# Patient Record
Sex: FEMALE | Race: WHITE | NOT HISPANIC OR LATINO | Employment: FULL TIME | URBAN - METROPOLITAN AREA
[De-identification: names, ages, dates, MRNs, and addresses within clinical notes are randomized per-mention and may not be internally consistent; named-entity substitution may affect disease eponyms.]

---

## 2017-01-14 ENCOUNTER — HOSPITAL ENCOUNTER (EMERGENCY)
Facility: OTHER | Age: 22
Discharge: HOME | End: 2017-01-14
Attending: OBSTETRICS & GYNECOLOGY
Payer: MEDICAID

## 2017-01-14 VITALS
HEART RATE: 82 BPM | TEMPERATURE: 97 F | SYSTOLIC BLOOD PRESSURE: 114 MMHG | RESPIRATION RATE: 18 BRPM | DIASTOLIC BLOOD PRESSURE: 75 MMHG | OXYGEN SATURATION: 100 %

## 2017-01-14 DIAGNOSIS — Z3A.38 38 WEEKS GESTATION OF PREGNANCY: ICD-10-CM

## 2017-01-14 DIAGNOSIS — O47.9 IRREGULAR UTERINE CONTRACTIONS: Primary | ICD-10-CM

## 2017-01-14 PROCEDURE — 59025 FETAL NON-STRESS TEST: CPT | Mod: 26,,, | Performed by: OBSTETRICS & GYNECOLOGY

## 2017-01-14 PROCEDURE — 59025 FETAL NON-STRESS TEST: CPT

## 2017-01-14 PROCEDURE — 99282 EMERGENCY DEPT VISIT SF MDM: CPT | Mod: 25,,, | Performed by: OBSTETRICS & GYNECOLOGY

## 2017-01-14 PROCEDURE — 99284 EMERGENCY DEPT VISIT MOD MDM: CPT | Mod: 25

## 2017-01-14 NOTE — ED PROVIDER NOTES
Encounter Date: 2017       History     Tosin Mckay is a 21 y.o. F at Unknown presents complaining of intermittent contractions. Pt cannot quantify frequency, but states they are less frequent than q5. Rated 6/10. Associated with pelvic pressure.  PT gets care at [facility] with Dr. Friend and came by EMS as she did not believe she could make it. Denies VB, LOF or decreased fetal movement.  This IUP is complicated by teen pregnancy per patient.      Chief Complaint   Patient presents with    Contractions     Review of patient's allergies indicates:  No Known Allergies  The history is provided by the patient.     History reviewed. No pertinent past medical history.  Past Medical History Pertinent Negatives   Diagnosis Date Noted    Asthma 2017    Diabetes mellitus 2017    Hypertension 2017     No past surgical history on file.  History reviewed. No pertinent family history.  Social History   Substance Use Topics    Smoking status: None    Smokeless tobacco: None    Alcohol use None     Review of Systems   Gastrointestinal: Positive for abdominal pain.   Genitourinary: Positive for pelvic pain. Negative for dysuria, hematuria, vaginal bleeding, vaginal discharge and vaginal pain.       Physical Exam   Initial Vitals   BP Pulse Resp Temp SpO2   17 0601 17 0601 17 0601 17 0601 17 0601   114/75 86 18 97.3 °F (36.3 °C) 100 %     Physical Exam    Vitals reviewed.  Constitutional: She appears well-developed and well-nourished. She is not diaphoretic. No distress.   Pleasant, appears comfortable.   HENT:   Head: Normocephalic and atraumatic.   Eyes: EOM are normal.   Neck: Normal range of motion.   Cardiovascular: Normal rate.   Pulmonary/Chest: No respiratory distress.   Abdominal: There is no tenderness.   Genitourinary: No labial fusion. There is no rash, tenderness, lesion or injury on the right labia. There is no rash, tenderness, lesion or injury on the  left labia.   Psychiatric: She has a normal mood and affect.     OB LABOR EXAM:   Pre-Term Labor: No.   Membranes ruptured: No.   Method: Sterile vaginal exam per MD.   Vaginal Bleeding: none present.     Dilatation: 1.     Amniotic Fluid Color: no fluid.     Comments: NST reactive and reassuring  US: cephalic with MVP of 5.34cm       ED Course   Procedures  Labs Reviewed   POCT URINALYSIS, DIPSTICK OR TABLET REAGENT, AUTOMATED, WITH MICROSCOP             Medical Decision Making:   ED Management:  -NST reactive and reassuring  -Minimal contractions on monitor  -Pt is comfortable. Cervix unchanged from clinic visit.  -Labor precautions reviewed with patient and mother  Other:   I have discussed this case with another health care provider.                   ED Course     Clinical Impression:   The encounter diagnosis was Irregular uterine contractions.    Disposition:   Disposition: Discharged  Condition: Stable  Pt was given routine pregnancy instructions including to return to triage if she had any vaginal bleeding (other than spotting for the next 48hrs), any loss of fluid like her water broke, decreased fetal movement, or contractions Q 5min  lasting for 2 or more hours. Pt was also instructed to drink copious water. Patient voiced understanding of all theseinstructions and was subsequently discharged home.       Annette Stark MD  Resident  01/14/17 1089

## 2017-01-14 NOTE — ED AVS SNAPSHOT
[facility]  [address]  [place] [address]               Tosin Mckay   2017  5:55 AM   ED    Description:  Female : 1995   Department:  [facility]           Your Care was Coordinated By:     Provider Role From To    Kamille Lam DO Attending Provider 17 --    Annette Stark MD Resident 17 --      Reason for Visit     Contractions           Diagnoses this Visit        Comments    Irregular uterine contractions    -  Primary       ED Disposition     ED Disposition Condition Comment    Discharge  Patient given discharge instructions.  Patient verbalized understanding.  Discharged home with no further questions.              To Do List           [facility]     [facility] Nurse Care Line -  Assistance  Registered nurses in the [facility] provide clinical advisement, health education, appointment booking, and other advisory services.  Call for this free service at [phone].             Medications                Verify that the below list of medications is an accurate representation of the medications you are currently taking.  If none reported, the list may be blank. If incorrect, please contact your healthcare provider. Carry this list with you in case of emergency.                Clinical Reference Information           Your Vitals Were     BP Pulse Temp Resp SpO2       114/75 82 97.3 °F (36.3 °C) (Temporal) 18 100%       Allergies as of 2017     No Known Allergies      Immunizations Administered on Date of Encounter - 2017     None      ED Micro, Lab, POCT     Start Ordered       Status Ordering Provider    17 0609 17 0608  POCT urinalysis, dipstick or tablet reag  Once      Acknowledged       ED Imaging Orders     None        Discharge Instructions       Keep previously scheduled clinic appointment  Return to L&D if you experience contractions every 2-5  minutes for two consecutive hours  Vaginal Bleeding  Decreased fetal movement  Leaking of fluid  Headache, dizziness or blurred vision  Temperature 100.4 or greater  Call the clinic ([phone]) during the day time or L&D ([phone]) after hours for any questions/concerns.  Drink 8-10 glasses of water a day    [facility]  To reach our free 24/7 nurse care line, call [phone]  Our specially trained nurses will help you determine the best care options for you. [facility] provides nurse triage, appointment booking, health education, and advisory services.       [facility] Sign-Up     Activating your [facility] account is as easy as 1-2-3!     1) Visit [email or web address], select Sign Up Now, enter this activation code and your date of birth, then select Next.  [email or web address]  Expires: 2/28/2017  6:38 AM      2) Create a username and password to use when you visit [facility] in the future and select a security question in case you lose your password and select Next.    3) Enter your e-mail address and click Sign Up!    Additional Information  If you have questions, please e-mail [email or web address] or call [phone] to talk to our [facility] staff. Remember, [facility] is NOT to be used for urgent needs. For medical emergencies, dial 911.         Smoking Cessation     If you would like to quit smoking:   You may be eligible for free services if you are a [place] resident and started smoking cigarettes before September 1, 1988.  Call the Smoking Cessation Trust (SCT) toll free at (523) 397-3522 or (977) 989-8065.   Call 7-450-QUIT-NOW if you do not meet the above criteria.             [facility] complies with applicable Federal civil rights laws and does not discriminate on the basis of race, color, national origin, age, disability, or sex.        Language Assistance Services     ATTENTION: Language assistance services are available, free of charge. Please call [phone].       ATENCIÓN: Si habla español, tiene a elizondo disposición servicios gratuitos de asistencia lingüística. Llame al [phone].     CHÚ Ý: N?u b?n nói Ti?ng Vi?t, có các d?ch v? h? tr? ngôn ng? mi?n phí dành cho b?n. G?i s? [phone].

## 2017-01-14 NOTE — ED TRIAGE NOTES
Pt arrives via EMS complaining of vaginal pressure and irregular contractions that are no less than 5 min apart. Pt states she sees Dr. Friend in [place] and is scheduled for and induction with him next week.

## 2017-01-14 NOTE — DISCHARGE INSTRUCTIONS
Keep previously scheduled clinic appointment  Return to L&D if you experience contractions every 2-5 minutes for two consecutive hours  Vaginal Bleeding  Decreased fetal movement  Leaking of fluid  Headache, dizziness or blurred vision  Temperature 100.4 or greater  Call the clinic ([phone]) during the day time or L&D ([phone]) after hours for any questions/concerns.  Drink 8-10 glasses of water a day    [facility]  To reach our free 24/7 nurse care line, call [phone]  Our specially trained nurses will help you determine the best care options for you. [facility] provides nurse triage, appointment booking, health education, and advisory services.

## 2017-01-26 ENCOUNTER — HOSPITAL ENCOUNTER (OUTPATIENT)
Facility: OTHER | Age: 22
Discharge: HOME | End: 2017-01-27
Attending: OBSTETRICS & GYNECOLOGY | Admitting: OBSTETRICS & GYNECOLOGY
Payer: MEDICAID

## 2017-01-26 DIAGNOSIS — R93.89 THICKENED ENDOMETRIUM: Primary | ICD-10-CM

## 2017-01-26 LAB
ABO + RH BLD: NORMAL
BASOPHILS # BLD AUTO: 0.03 K/UL
BASOPHILS NFR BLD: 0.2 %
BLD GP AB SCN CELLS X3 SERPL QL: NORMAL
DIFFERENTIAL METHOD: ABNORMAL
EOSINOPHIL # BLD AUTO: 0.1 K/UL
EOSINOPHIL NFR BLD: 0.8 %
ERYTHROCYTE [DISTWIDTH] IN BLOOD BY AUTOMATED COUNT: 14.5 %
HCT VFR BLD AUTO: 28.6 %
HGB BLD-MCNC: 9.5 G/DL
LYMPHOCYTES # BLD AUTO: 1.3 K/UL
LYMPHOCYTES NFR BLD: 10.2 %
MCH RBC QN AUTO: 25.8 PG
MCHC RBC AUTO-ENTMCNC: 33.2 %
MCV RBC AUTO: 78 FL
MONOCYTES # BLD AUTO: 0.5 K/UL
MONOCYTES NFR BLD: 3.9 %
NEUTROPHILS # BLD AUTO: 10.6 K/UL
NEUTROPHILS NFR BLD: 84.5 %
PLATELET # BLD AUTO: 190 K/UL
PMV BLD AUTO: 9.8 FL
RBC # BLD AUTO: 3.68 M/UL
WBC # BLD AUTO: 12.53 K/UL

## 2017-01-26 PROCEDURE — 76815 OB US LIMITED FETUS(S): CPT

## 2017-01-26 PROCEDURE — 85025 COMPLETE CBC W/AUTO DIFF WBC: CPT

## 2017-01-26 PROCEDURE — 86850 RBC ANTIBODY SCREEN: CPT

## 2017-01-26 PROCEDURE — G0378 HOSPITAL OBSERVATION PER HR: HCPCS

## 2017-01-26 PROCEDURE — 25000003 PHARM REV CODE 250: Performed by: OBSTETRICS & GYNECOLOGY

## 2017-01-26 PROCEDURE — 25000003 PHARM REV CODE 250: Performed by: STUDENT IN AN ORGANIZED HEALTH CARE EDUCATION/TRAINING PROGRAM

## 2017-01-26 PROCEDURE — 86900 BLOOD TYPING SEROLOGIC ABO: CPT

## 2017-01-26 PROCEDURE — 99285 EMERGENCY DEPT VISIT HI MDM: CPT | Mod: 25

## 2017-01-26 PROCEDURE — 99283 EMERGENCY DEPT VISIT LOW MDM: CPT | Mod: ,,, | Performed by: OBSTETRICS & GYNECOLOGY

## 2017-01-26 RX ORDER — OXYCODONE AND ACETAMINOPHEN 10; 325 MG/1; MG/1
1 TABLET ORAL EVERY 4 HOURS PRN
Status: DISCONTINUED | OUTPATIENT
Start: 2017-01-26 | End: 2017-01-27 | Stop reason: HOSPADM

## 2017-01-26 RX ORDER — ONDANSETRON 4 MG/1
8 TABLET, ORALLY DISINTEGRATING ORAL EVERY 8 HOURS PRN
Status: DISCONTINUED | OUTPATIENT
Start: 2017-01-26 | End: 2017-01-27 | Stop reason: HOSPADM

## 2017-01-26 RX ORDER — DIPHENHYDRAMINE HYDROCHLORIDE 50 MG/ML
25 INJECTION INTRAMUSCULAR; INTRAVENOUS EVERY 4 HOURS PRN
Status: DISCONTINUED | OUTPATIENT
Start: 2017-01-26 | End: 2017-01-27 | Stop reason: HOSPADM

## 2017-01-26 RX ORDER — OXYCODONE AND ACETAMINOPHEN 5; 325 MG/1; MG/1
1 TABLET ORAL EVERY 4 HOURS PRN
Status: DISCONTINUED | OUTPATIENT
Start: 2017-01-26 | End: 2017-01-27 | Stop reason: HOSPADM

## 2017-01-26 RX ORDER — DIPHENHYDRAMINE HCL 25 MG
25 CAPSULE ORAL EVERY 4 HOURS PRN
Status: DISCONTINUED | OUTPATIENT
Start: 2017-01-26 | End: 2017-01-27 | Stop reason: HOSPADM

## 2017-01-26 RX ORDER — ACETAMINOPHEN 325 MG/1
650 TABLET ORAL EVERY 6 HOURS PRN
Status: DISCONTINUED | OUTPATIENT
Start: 2017-01-26 | End: 2017-01-27 | Stop reason: HOSPADM

## 2017-01-26 RX ORDER — HYDROCORTISONE 25 MG/G
CREAM TOPICAL 3 TIMES DAILY PRN
Status: DISCONTINUED | OUTPATIENT
Start: 2017-01-26 | End: 2017-01-27 | Stop reason: HOSPADM

## 2017-01-26 RX ORDER — MISOPROSTOL 200 UG/1
200 TABLET ORAL EVERY 6 HOURS
Status: COMPLETED | OUTPATIENT
Start: 2017-01-26 | End: 2017-01-27

## 2017-01-26 RX ORDER — DOCUSATE SODIUM 100 MG/1
200 CAPSULE, LIQUID FILLED ORAL 2 TIMES DAILY PRN
Status: DISCONTINUED | OUTPATIENT
Start: 2017-01-26 | End: 2017-01-27 | Stop reason: HOSPADM

## 2017-01-26 RX ORDER — IBUPROFEN 600 MG/1
600 TABLET ORAL EVERY 6 HOURS PRN
Status: DISCONTINUED | OUTPATIENT
Start: 2017-01-26 | End: 2017-01-27 | Stop reason: HOSPADM

## 2017-01-26 RX ORDER — MISOPROSTOL 200 UG/1
400 TABLET ORAL ONCE
Status: COMPLETED | OUTPATIENT
Start: 2017-01-26 | End: 2017-01-26

## 2017-01-26 RX ORDER — SODIUM CHLORIDE, SODIUM LACTATE, POTASSIUM CHLORIDE, CALCIUM CHLORIDE 600; 310; 30; 20 MG/100ML; MG/100ML; MG/100ML; MG/100ML
INJECTION, SOLUTION INTRAVENOUS CONTINUOUS
Status: DISCONTINUED | OUTPATIENT
Start: 2017-01-26 | End: 2017-01-27

## 2017-01-26 RX ORDER — ZOLPIDEM TARTRATE 5 MG/1
5 TABLET ORAL NIGHTLY PRN
Status: DISCONTINUED | OUTPATIENT
Start: 2017-01-26 | End: 2017-01-27 | Stop reason: HOSPADM

## 2017-01-26 RX ORDER — SODIUM CHLORIDE 9 MG/ML
INJECTION, SOLUTION INTRAVENOUS CONTINUOUS
Status: DISCONTINUED | OUTPATIENT
Start: 2017-01-26 | End: 2017-01-26

## 2017-01-26 RX ADMIN — SODIUM CHLORIDE, SODIUM LACTATE, POTASSIUM CHLORIDE, AND CALCIUM CHLORIDE: .6; .31; .03; .02 INJECTION, SOLUTION INTRAVENOUS at 11:01

## 2017-01-26 RX ADMIN — SODIUM CHLORIDE, SODIUM LACTATE, POTASSIUM CHLORIDE, AND CALCIUM CHLORIDE 125 ML/HR: .6; .31; .03; .02 INJECTION, SOLUTION INTRAVENOUS at 05:01

## 2017-01-26 RX ADMIN — MISOPROSTOL 200 MCG: 200 TABLET ORAL at 10:01

## 2017-01-26 RX ADMIN — MISOPROSTOL 400 MCG: 200 TABLET ORAL at 03:01

## 2017-01-26 NOTE — IP AVS SNAPSHOT
[facility] ([facility])  [address] [place], [place] [id number]  Phone: [phone]           Patient Discharge Instructions     Our goal is to set you up for success. This packet includes information on your condition, medications, and your home care. It will help you to care for yourself so you don't get sicker and need to go back to the hospital.     Please ask your nurse if you have any questions.        There are many details to remember when preparing to leave the hospital. Here is what you will need to do:    1. Take your medicine. If you are prescribed medications, review your Medication List in the following pages. You may have new medications to  at the pharmacy and others that you'll need to stop taking. Review the instructions for how and when to take your medications. Talk with your doctor or nurses if you are unsure of what to do.     2. Go to your follow-up appointments. Specific follow-up information is listed in the following pages. Your may be contacted by a transition nurse or clinical provider about future appointments. Be sure we have all of the phone numbers to reach you, if needed. Please contact your provider's office if you are unable to make an appointment.     3. Watch for warning signs. Your doctor or nurse will give you detailed warning signs to watch for and when to call for assistance. These instructions may also include educational information about your condition. If you experience any of warning signs to your health, call your doctor.               [facility]  Unless otherwise directed by your provider, please contact [facility], our nurse care line that is available for 24/7 assistance.     [phone] (toll-free)    Registered nurses in the [facility] provide clinical advisement, health education, appointment booking, and other advisory services.                    ** Verify the list of medication(s) below is accurate and up to  date. Carry this with you in case of emergency. If your medications have changed, please notify your healthcare provider.             Medication List      START taking these medications        Additional Info                      misoprostol 200 MCG Tab   Commonly known as:  CYTOTEC   Quantity:  1 tablet   Refills:  0   Dose:  200 mcg    Last time this was given:  200 mcg on 1/27/2017 10:37 AM   Instructions:  Take 1 tablet (200 mcg total) by mouth once.     Begin Date    AM    Noon    PM    Bedtime            Where to Get Your Medications      You can get these medications from any pharmacy     Bring a paper prescription for each of these medications     misoprostol 200 MCG Tab                  Please bring to all follow up appointments:    1. A copy of your discharge instructions.  2. All medicines you are currently taking in their original bottles.  3. Identification and insurance card.    Please arrive 15 minutes ahead of scheduled appointment time.    Please call 24 hours in advance if you must reschedule your appointment and/or time.          Discharge Instructions     Future Orders    Activity as tolerated     Call MD for:  difficulty breathing or increased cough     Call MD for:  increased confusion or weakness     Call MD for:  persistent dizziness, light-headedness, or visual disturbances     Call MD for:  persistent nausea and vomiting or diarrhea     Call MD for:  severe persistent headache     Call MD for:  severe uncontrolled pain     Call MD for:  temperature >100.4     Call MD for:     Comments:    Vaginal bleeding through one or more pads each hour for 2 hours    Diet general     Questions:    Total calories:      Fat restriction, if any:      Protein restriction, if any:      Na restriction, if any:      Fluid restriction:      Additional restrictions:      Other restrictions (specify):     Comments:    Pelvic rest until post partum visit. (No sex, no tampons, etc.)        Primary Diagnosis      Your primary diagnosis was:  Postpartum Hemorrhage      Admission Information     Date & Time Provider Department CSN    1/26/2017  1:41 PM Ranjit Sotelo MD [facility] [id number]      Care Providers     Provider Role Specialty Primary office phone    Ranjit Sotelo MD Attending Provider Obstetrics [phone]      Your Vitals Were     BP Pulse Temp Resp SpO2       121/64 76 98.3 °F (36.8 °C) (Oral) 18 100%       Recent Lab Values     No lab values to display.      Allergies as of 1/27/2017     No Known Allergies      Advance Directives     An advance directive is a document which, in the event you are no longer able to make decisions for yourself, tells your healthcare team what kind of treatment you do or do not want to receive, or who you would like to make those decisions for you.  If you do not currently have an advance directive, [facility] encourages you to create one.  For more information call:  [phone]), [phone] ([phone]),  or log on to [email or web address].        Smoking Cessation     If you would like to quit smoking:   You may be eligible for free services if you are a [place] resident and started smoking cigarettes before September 1, 1988.  Call the Smoking Cessation Trust (SCT) toll free at (143) 503-5851 or (810) 311-6418.   Call 6-889-QUIT-NOW if you do not meet the above criteria.            Language Assistance Services     ATTENTION: Language assistance services are available, free of charge. Please call [phone].      ATENCIÓN: Si habla español, tiene a elizondo disposición servicios gratuitos de asistencia lingüística. Llame al [phone].     CHÚ Ý: N?u b?n nói Ti?ng Vi?t, có các d?ch v? h? tr? ngôn ng? mi?n phí dành cho b?n. G?i s? [phone].        [facility] Sign-Up     Activating your [facility] account is as easy as 1-2-3!     1) Visit [email or web address], select Sign Up Now, enter this activation code and your date of birth, then  select Next.  [email or web address]  Expires: 2/28/2017  6:38 AM      2) Create a username and password to use when you visit [facility] in the future and select a security question in case you lose your password and select Next.    3) Enter your e-mail address and click Sign Up!    Additional Information  If you have questions, please e-mail [email or web address] or call [phone] to talk to our [facility] staff. Remember, [facility] is NOT to be used for urgent needs. For medical emergencies, dial 911.          [facility] complies with applicable Federal civil rights laws and does not discriminate on the basis of race, color, national origin, age, disability, or sex.

## 2017-01-26 NOTE — PROGRESS NOTES
"Pt from OB ED to [facility] room 645 per w/c, oriented to room and call bell/ bed controls, denies pain, only c/o "being cold," warm blankets applied and room temp raised for comfort, instructed pt on pad counts and notification of any bleeding, verbalized understanding.  "

## 2017-01-26 NOTE — H&P
History and Physical  Antepartum          Subjective:      Ms. Mckay is a 22 yo  PPD3 s/p uncomplicated term  who presents to [facility] via EMS with complaints of vaginal bleeding.  Patient states she delivered at [facility] on Monday with no complications during delivery.  She reports that a few hours after delivery she experienced heavy vaginal bleeding, passing clots.  She states her Hgb was 5.8 and she received 2u pRBCs yesterday.  She reports she was discharged home last night after the transfusion was complete.  Patient states this morning she woke up around 11AM and noted vaginal bleeding with clots. She states she called her primary Ob who instructed her to go to the hospital. Patient states she was unable to get to [facility] so she came here.  She currently reports weakness, denies lightheadedness, dizziness, nausea, or vomiting. She does feel that she looks paler than usual. She has no other complaints.       PMHx: No past medical history on file.    PSHx: No past surgical history on file.    All: Review of patient's allergies indicates:  No Known Allergies    Meds:   (Not in a hospital admission)    SH:   Social History     Social History    Marital status: Single     Spouse name: N/A    Number of children: N/A    Years of education: N/A     Occupational History    Not on file.     Social History Main Topics    Smoking status: Not on file    Smokeless tobacco: Not on file    Alcohol use Not on file    Drug use: Not on file    Sexual activity: Not on file     Other Topics Concern    Not on file     Social History Narrative       FH: No family history on file.    OBHx:   Obstetric History       T0      TAB0   SAB0   E0   M0   L2       # Outcome Date GA Lbr Colby/2nd Weight Sex Delivery Anes PTL Lv   2 Para 17    F Vag-Spont   Y   1 Para 14    M Vag-Spont   Y          Objective:         Visit Vitals    /63    Pulse 80    Temp 98 °F (36.7 °C)    Resp  18    SpO2 100%       Temp:  [98 °F (36.7 °C)] 98 °F (36.7 °C)  Pulse:  [80] 80  Resp:  [18] 18  SpO2:  [100 %] 100 %  BP: (123)/(63) 123/63     Constitutional: She appears well-developed and well-nourished. No distress.   HENT:   Cardiovascular: Normal rate, regular rhythm, normal heart sounds and intact distal pulses.   Pulmonary/Chest: Breath sounds normal. No respiratory distress.   Abdominal: Soft. There is no tenderness. There is no rebound.   Pelvic: Uterus firm, nontender, about 14 wks in size. Watery lochia expressed   Musculoskeletal: She exhibits no edema or tenderness.   Neurological: She is alert and oriented to person, place, and time.   Skin: Skin is warm and dry. There is pallor.   Psychiatric: She has a normal mood and affect. Her behavior is normal. Judgment and thought content normal.     Bedside Ultrasound:  Limited ultrasound uterus enlarged - endometrial stripe appears   3-4 cm & heterogenous (I am unable to measure adequately & machine paper   is broken) will get formal ultrasound.      Assessment:     22 yo  PPD3 s/p uncomplicated term  who presents to [facility] via EMS with complaints of vaginal bleeding    Active Hospital Problems    Diagnosis  POA    *Postpartum bleeding [O72.1]  Unknown    Thickened endometrium [R93.8]  Yes      Resolved Hospital Problems    Diagnosis Date Resolved POA   No resolved problems to display.            Plan:          1. Postpartum bleeding  - Admit to antepartum service  - Consents for blood transfusion signed and to chart  - Risks, benefits, alternatives and possible complications have been discussed in detail with the   patient.    - Will put patient on cytotec series, 400mg now, 200mg q6 x 3 doses  - Pad counts  - CBC in AM  - Will keep patient NPO tonight in case she requires D & C, plan to allow diet in morning  - **Will need D & C consent if she requires surgery      Discussed plan with Dr. Ashleigh Carson MD

## 2017-01-26 NOTE — ED PROVIDER NOTES
Encounter Date: 2017       History     Chief Complaint   Patient presents with    Vaginal Bleeding     Review of patient's allergies indicates:  No Known Allergies  HPI Comments: 20 yo  s/p  at term, now Post partum Day #3 presents via EMS for heavy vaginal bleeding.  Patient reports being sent home from the hospital at [facility] last night after a delivery on Monday. The deliver is reported as uneventful; but the patient reports very heavy bleeding and passing out post partum. She was given 2 units of blood yesterday & sent home. Patient reports her Hemaglobin was 5.8 prior to transfusion.    She spent the night at home (she lives on the [place] now) and woke up passing large clots again - today the size of a grapefruit. She called her Dr's office and was told to seek care.     She reports feeling weak; but not dizzy right now.      The history is provided by the patient and the EMS personnel.     No past medical history on file.  Past Medical History Pertinent Negatives   Diagnosis Date Noted    Asthma 2017    Diabetes mellitus 2017    Hypertension 2017     No past surgical history on file.  No family history on file.  Social History   Substance Use Topics    Smoking status: Not on file    Smokeless tobacco: Not on file    Alcohol use Not on file     Review of Systems   Constitutional: Positive for chills and fatigue. Negative for fever.   HENT: Negative.  Negative for sore throat.    Eyes: Negative.    Respiratory: Negative.    Cardiovascular: Negative.    Gastrointestinal: Negative for constipation, diarrhea, nausea and vomiting.   Genitourinary: Positive for vaginal bleeding. Negative for difficulty urinating and pelvic pain.   Musculoskeletal: Negative.    Skin: Positive for pallor. Negative for rash and wound.   Hematological: Negative.        Physical Exam   Initial Vitals   BP Pulse Resp Temp SpO2   -- -- -- -- --            Visit Vitals    /63    Pulse  80    Temp 98 °F (36.7 °C)    Resp 18    SpO2 100%       Physical Exam    Nursing note and vitals reviewed.  Constitutional: She appears well-developed and well-nourished. No distress.   HENT:   Head: Normocephalic and atraumatic.   Mouth/Throat: Oropharynx is clear and moist.   Eyes: Conjunctivae and EOM are normal. No scleral icterus.   Cardiovascular: Normal rate, regular rhythm, normal heart sounds and intact distal pulses.   Pulmonary/Chest: Breath sounds normal. No respiratory distress.   Abdominal: Soft. There is no tenderness. There is no rebound.   Uterus ~ 14 weeks; nontender   Genitourinary:   Genitourinary Comments: Watery lochia expressed   Musculoskeletal: She exhibits no edema or tenderness.   Neurological: She is alert and oriented to person, place, and time.   Skin: Skin is warm and dry. There is pallor.   Psychiatric: She has a normal mood and affect. Her behavior is normal. Judgment and thought content normal.         ED Course   US - ED Performed - Abdomen Limited  Date/Time: 1/26/2017 1:59 PM  Performed by: ZANA YEE  Authorized by: ZANA YEE   Comments: Limited ultrasound uterus enlarged - endometrial stripe appears 3-4 cm & heterogenous (I am unable to measure adequately & machine paper is broken) will get formal ultrasound.        Labs Reviewed   CBC W/ AUTO DIFFERENTIAL   POCT URINALYSIS, DIPSTICK OR TABLET REAGENT, AUTOMATED, WITH MICROSCOP   TYPE & SCREEN             Medical Decision Making:   Initial Assessment:   Heavy vaginal bleeding with report of passing large clots at home PPD#3, with history of delayed pp hemorrhage   Differential Diagnosis:   Retained placenta  Subinvolution of uterus  Delayed post partum hemorrhage    Clinical Tests:   Lab Tests: Ordered and Reviewed       <> Summary of Lab: CBC 12.9/9.5/29/190    Pelvic ultrasound - I reviewed preliminary ultrasound at bedside with technicians; lower endometrium is 0.8 cm; upper endometrial complex  is 3 cm; heterogenous & concerning for retained placental fragment/membranes    Radiological Study: Ordered and Reviewed  ED Management:  Patient evaluated. No active heavy bleeding. Vital signs stable.  Saline lock placed.  Bedside ultrasound performed; thickened endometrial complex noted.   Case discussed with on-call providers. They would like to give Cytotec orally, will admit to observation and continue Cytotec orally, and get serial exams. Patient may require Suction currettage if medical management is not successful.                    ED Course     Clinical Impression:   The primary encounter diagnosis was Thickened endometrium. A diagnosis of Delayed postpartum hemorrhage was also pertinent to this visit.          Gela Carson MD  01/26/17 4649

## 2017-01-27 VITALS
OXYGEN SATURATION: 100 % | SYSTOLIC BLOOD PRESSURE: 121 MMHG | HEART RATE: 76 BPM | DIASTOLIC BLOOD PRESSURE: 64 MMHG | RESPIRATION RATE: 18 BRPM | TEMPERATURE: 98 F

## 2017-01-27 LAB
BASOPHILS # BLD AUTO: 0.05 K/UL
BASOPHILS NFR BLD: 0.5 %
DIFFERENTIAL METHOD: ABNORMAL
EOSINOPHIL # BLD AUTO: 0.1 K/UL
EOSINOPHIL NFR BLD: 1.4 %
ERYTHROCYTE [DISTWIDTH] IN BLOOD BY AUTOMATED COUNT: 14.8 %
HCT VFR BLD AUTO: 32.5 %
HGB BLD-MCNC: 10.4 G/DL
LYMPHOCYTES # BLD AUTO: 2.1 K/UL
LYMPHOCYTES NFR BLD: 22.3 %
MCH RBC QN AUTO: 25.1 PG
MCHC RBC AUTO-ENTMCNC: 32 %
MCV RBC AUTO: 78 FL
MONOCYTES # BLD AUTO: 0.6 K/UL
MONOCYTES NFR BLD: 6.1 %
NEUTROPHILS # BLD AUTO: 6.7 K/UL
NEUTROPHILS NFR BLD: 69.4 %
PLATELET # BLD AUTO: 233 K/UL
PMV BLD AUTO: 9.8 FL
RBC # BLD AUTO: 4.15 M/UL
WBC # BLD AUTO: 9.61 K/UL

## 2017-01-27 PROCEDURE — G0378 HOSPITAL OBSERVATION PER HR: HCPCS

## 2017-01-27 PROCEDURE — 25000003 PHARM REV CODE 250: Performed by: STUDENT IN AN ORGANIZED HEALTH CARE EDUCATION/TRAINING PROGRAM

## 2017-01-27 PROCEDURE — 36415 COLL VENOUS BLD VENIPUNCTURE: CPT

## 2017-01-27 PROCEDURE — 85025 COMPLETE CBC W/AUTO DIFF WBC: CPT

## 2017-01-27 PROCEDURE — 99239 HOSP IP/OBS DSCHRG MGMT >30: CPT | Mod: ,,, | Performed by: OBSTETRICS & GYNECOLOGY

## 2017-01-27 RX ORDER — MISOPROSTOL 200 UG/1
200 TABLET ORAL ONCE
Qty: 1 TABLET | Refills: 0 | Status: SHIPPED | OUTPATIENT
Start: 2017-01-27 | End: 2018-02-01

## 2017-01-27 RX ADMIN — MISOPROSTOL 200 MCG: 200 TABLET ORAL at 10:01

## 2017-01-27 RX ADMIN — MISOPROSTOL 200 MCG: 200 TABLET ORAL at 04:01

## 2017-01-27 NOTE — DISCHARGE SUMMARY
Discharge Summary  Obestetrics      Admit Date: 2017    Discharge Date and Time: 2017     Attending Physician: Ranjit Sotelo MD    Principal Diagnoses: Postpartum bleeding    Active Hospital Problems    Diagnosis  POA    *Postpartum bleeding [O72.1]  Unknown    Thickened endometrium [R93.8]  Yes      Resolved Hospital Problems    Diagnosis Date Resolved POA   No resolved problems to display.       Procedures: * No surgery found *    Discharged Condition: good    Hospital Course:   Ms. Mckay is a 22 yo  who presented via EMS on PPD#3 s/p term  that was complicated by PPH for vaginal bleeding and feelings of weakness, dizziness.  Patient states she delivered at [facility] on Monday with no complications during delivery. However, a few hours after delivery she experienced heavy vaginal bleeding, passing clots. She states her Hgb was 5.8 and she received 2u pRBCs on . She reports she was discharged home after the transfusion. On the morning of her presentation to our hospital, she noted vaginal bleeding with clots and complained of weakness. On presentation, VSS, PE benign, and bleeding was minimal bleeding on exam. Decision was made to place patient in observation and started on a cytotec series. Pt did well overnight with no acute bleeding episodes. Vital signs remained stable.   On day of discharge, pt was urinating, ambulating and tolerating PO. Bleeding had stabilized. She was discharged home in stable condition with ED precautions.    Consults: None    Significant Diagnostic Studies:    Recent Labs  Lab 17  1355   WBC 12.53   HGB 9.5*   HCT 28.6*   MCV 78*        Disposition: Home or Self Care    Patient Instructions:   Current Discharge Medication List      START taking these medications    Details   misoprostol (CYTOTEC) 200 MCG Tab Take 1 tablet (200 mcg total) by mouth once.  Qty: 1 tablet, Refills: 0    Associated Diagnoses: Postpartum hemorrhage, unspecified type                Discharge Procedure Orders  Diet general     Activity as tolerated     Other restrictions (specify):   Order Comments: Pelvic rest until post partum visit. (No sex, no tampons, etc.)     Call MD for:  temperature >100.4     Call MD for:  persistent nausea and vomiting or diarrhea     Call MD for:  severe uncontrolled pain     Call MD for:  difficulty breathing or increased cough     Call MD for:  severe persistent headache     Call MD for:  persistent dizziness, light-headedness, or visual disturbances     Call MD for:  increased confusion or weakness     Call MD for:   Order Comments: Vaginal bleeding through one or more pads each hour for 2 hours         Annette Stark M.D.  PGY-2 ObGyn  [phone]

## 2017-01-27 NOTE — PROGRESS NOTES
PROGRESS NOTE  ANTEPARTUM    Admit Date: 1/26/2017   LOS: 0 days     Reason for Admission:  Postpartum bleeding    SUBJECTIVE:     Tosin Mckay is a 21 y.o. female who is here with postpartum bleeding.    Pt did well overnight without any acute events. Bleeding has been minimal. Denies fevers, chills, weakness, dizziness, CP, SOB, N/V, severe abdominal pain. She is urinating, ambulating without difficulty.    Scheduled Meds:   misoprostol  200 mcg Oral Q6H     Continuous Infusions:   benzocaine-lanolin-aloe vera      lactated ringers 125 mL/hr at 01/26/17 2352     PRN Meds:acetaminophen, benzocaine-lanolin-aloe vera, diphenhydrAMINE, diphenhydrAMINE, docusate sodium, hydrocortisone, ibuprofen, lanolin, ondansetron, oxycodone-acetaminophen, oxycodone-acetaminophen, promethazine (PHENERGAN) IVPB, zolpidem    Review of patient's allergies indicates:  No Known Allergies    OBJECTIVE:     Vital Signs (Most Recent)  Temp: 99.9 °F (37.7 °C) (01/26/17 2353)  Pulse: 72 (01/26/17 2356)  Resp: 18 (01/26/17 2356)  BP: (!) 118/56 (01/26/17 2356)  SpO2: 100 % (01/26/17 1345)    Temperature Range Min/Max (Last 24H):  Temp:  [98 °F (36.7 °C)-99.9 °F (37.7 °C)]     Vital Signs Range (Last 24H):  Temp:  [98 °F (36.7 °C)-99.9 °F (37.7 °C)]   Pulse:  [72-85]   Resp:  [18-20]   BP: (118-123)/(56-64)   SpO2:  [100 %]     I & O (Last 24H):  Intake/Output Summary (Last 24 hours) at 01/27/17 0308  Last data filed at 01/27/17 0053   Gross per 24 hour   Intake                0 ml   Output             1300 ml   Net            -1300 ml       Physical Exam:  General: well developed, well nourished, no distress  Lungs:  clear to auscultation bilaterally and normal respiratory effort  Heart: regular rate and rhythm, S1, S2 normal, no murmur, click, rub or gallop  Abdomen: soft, non-tender non-distented; bowel sounds normal; no masses,  no organomegaly  Extremities: no cyanosis or edema, or clubbing and Homans sign is negative, no sign of  DVT    Laboratory:  CBC:   Recent Labs  Lab 17  1355   WBC 12.53   RBC 3.68*   HGB 9.5*   HCT 28.6*      MCV 78*   MCH 25.8*   MCHC 33.2       ASSESSMENT/PLAN:     Active Hospital Problems    Diagnosis  POA    *Postpartum bleeding [O72.1]  Unknown    Thickened endometrium [R93.8]  Yes      Resolved Hospital Problems    Diagnosis Date Resolved POA   No resolved problems to display.       Assessment:   21 y.o.female  who is PPD#4 s/p  here with postpartum bleeding    Plan:  1. Postpartum bleeding  - s/p 2 units at OSH  - h/H 9.5/28.6. AM labs pending.  - Bleeding minimal overnight  - Asymptomatic. VSS. PE benign.   - Continue cytotec series  - Will allow to eat as patient's status has improved and will likely not need D&C    DISPO: Plan to discharge today after staff rounds.    Annette Stark M.D.  PGY-2 ObGyn  [phone]

## 2017-06-23 ENCOUNTER — HOSPITAL ENCOUNTER (EMERGENCY)
Facility: HOSPITAL | Age: 22
Discharge: HOME | End: 2017-06-23
Attending: FAMILY MEDICINE
Payer: MEDICAID

## 2017-06-23 VITALS
TEMPERATURE: 99 F | RESPIRATION RATE: 16 BRPM | SYSTOLIC BLOOD PRESSURE: 130 MMHG | HEIGHT: 63 IN | HEART RATE: 100 BPM | OXYGEN SATURATION: 98 % | BODY MASS INDEX: 22.32 KG/M2 | WEIGHT: 126 LBS | DIASTOLIC BLOOD PRESSURE: 60 MMHG

## 2017-06-23 DIAGNOSIS — R31.9 URINARY TRACT INFECTION WITH HEMATURIA, SITE UNSPECIFIED: Primary | ICD-10-CM

## 2017-06-23 DIAGNOSIS — N39.0 URINARY TRACT INFECTION WITH HEMATURIA, SITE UNSPECIFIED: Primary | ICD-10-CM

## 2017-06-23 LAB
B-HCG UR QL: NEGATIVE
BACTERIA #/AREA URNS AUTO: ABNORMAL /HPF
BILIRUB UR QL STRIP: NEGATIVE
CLARITY UR REFRACT.AUTO: ABNORMAL
COLOR UR AUTO: ABNORMAL
CTP QC/QA: YES
GLUCOSE UR QL STRIP: NEGATIVE
HGB UR QL STRIP: ABNORMAL
HYALINE CASTS UR QL AUTO: 0 /LPF
KETONES UR QL STRIP: ABNORMAL
LEUKOCYTE ESTERASE UR QL STRIP: ABNORMAL
MICROSCOPIC COMMENT: ABNORMAL
NITRITE UR QL STRIP: POSITIVE
PH UR STRIP: 6 [PH] (ref 5–8)
PROT UR QL STRIP: ABNORMAL
RBC #/AREA URNS AUTO: >100 /HPF (ref 0–4)
SP GR UR STRIP: 1.02 (ref 1–1.03)
URN SPEC COLLECT METH UR: ABNORMAL
UROBILINOGEN UR STRIP-ACNC: NEGATIVE EU/DL
WBC #/AREA URNS AUTO: >100 /HPF (ref 0–5)

## 2017-06-23 PROCEDURE — 87186 SC STD MICRODIL/AGAR DIL: CPT

## 2017-06-23 PROCEDURE — 99284 EMERGENCY DEPT VISIT MOD MDM: CPT | Mod: ,,, | Performed by: PHYSICIAN ASSISTANT

## 2017-06-23 PROCEDURE — 81025 URINE PREGNANCY TEST: CPT | Performed by: FAMILY MEDICINE

## 2017-06-23 PROCEDURE — 87086 URINE CULTURE/COLONY COUNT: CPT

## 2017-06-23 PROCEDURE — 25000003 PHARM REV CODE 250: Performed by: PHYSICIAN ASSISTANT

## 2017-06-23 PROCEDURE — 99283 EMERGENCY DEPT VISIT LOW MDM: CPT | Mod: 25

## 2017-06-23 PROCEDURE — 87077 CULTURE AEROBIC IDENTIFY: CPT

## 2017-06-23 PROCEDURE — 81001 URINALYSIS AUTO W/SCOPE: CPT

## 2017-06-23 PROCEDURE — 87088 URINE BACTERIA CULTURE: CPT

## 2017-06-23 PROCEDURE — 81003 URINALYSIS AUTO W/O SCOPE: CPT

## 2017-06-23 RX ORDER — PHENAZOPYRIDINE HYDROCHLORIDE 200 MG/1
200 TABLET, FILM COATED ORAL
Qty: 6 TABLET | Refills: 0 | Status: SHIPPED | OUTPATIENT
Start: 2017-06-23 | End: 2017-07-03

## 2017-06-23 RX ORDER — NITROFURANTOIN 25; 75 MG/1; MG/1
100 CAPSULE ORAL EVERY 12 HOURS
Qty: 10 CAPSULE | Refills: 0 | Status: SHIPPED | OUTPATIENT
Start: 2017-06-23 | End: 2017-06-28

## 2017-06-23 RX ORDER — PHENAZOPYRIDINE HYDROCHLORIDE 200 MG/1
200 TABLET, FILM COATED ORAL
Status: COMPLETED | OUTPATIENT
Start: 2017-06-23 | End: 2017-06-23

## 2017-06-23 RX ORDER — NITROFURANTOIN 25; 75 MG/1; MG/1
100 CAPSULE ORAL
Status: COMPLETED | OUTPATIENT
Start: 2017-06-23 | End: 2017-06-23

## 2017-06-23 RX ADMIN — PHENAZOPYRIDINE HYDROCHLORIDE 200 MG: 200 TABLET ORAL at 11:06

## 2017-06-23 RX ADMIN — NITROFURANTOIN (MONOHYDRATE/MACROCRYSTALS) 100 MG: 75; 25 CAPSULE ORAL at 11:06

## 2017-06-23 NOTE — ED NOTES
Appearance:  Pt awake, alert & oriented to person, place & time.  Pt in no acute distress at present time.  Skin:  Skin warm, dry & intact.  Mucous membranes moist.  Skin turgor normal.  Respiratory:  Respirations even, non-labored.    Neurologic:  Pt moving all extremities without difficulty.  Sensation intact.     Peripheral Vascular:  All peripheral pulses present.  Abdomen:  Abdomen soft.  Lower abdominal tenderness.

## 2017-06-23 NOTE — ED PROVIDER NOTES
Encounter Date: 2017       History     Chief Complaint   Patient presents with    Dysuria     +hematuria, urgency.      21 year old female with  OB history (recent blood transfusion after vaginal delivery 5 months ago) presents to the ER with chief complaint of dysuria and pressure in the suprapubic area since this morning. She rates her pain 8/10.  She reports hematuria, increased urinary frequency and urgency x 1 day.  She denies back or flank pain, upper abdominal pain,  nausea, vomiting, fever or chills, vaginal discharge or bleeding. She is not breastfeeding.  LMP 17. She denies additional complaints at this time.            Review of patient's allergies indicates:  No Known Allergies  History reviewed. No pertinent past medical history.  History reviewed. No pertinent surgical history.  History reviewed. No pertinent family history.  Social History   Substance Use Topics    Smoking status: Never Smoker    Smokeless tobacco: Not on file    Alcohol use No     Review of Systems   Constitutional: Negative for chills and fever.   HENT: Negative for sore throat.    Respiratory: Negative for shortness of breath.    Cardiovascular: Negative for chest pain.   Gastrointestinal: Positive for abdominal pain (suprapubic). Negative for diarrhea, nausea and vomiting.   Genitourinary: Positive for dysuria, frequency, hematuria and urgency. Negative for difficulty urinating, flank pain, vaginal bleeding and vaginal discharge.   Musculoskeletal: Negative for back pain.   Skin: Negative for rash.   Neurological: Negative for weakness.   Hematological: Does not bruise/bleed easily.       Physical Exam     Initial Vitals [17 1015]   BP Pulse Resp Temp SpO2   130/60 100 16 98.8 °F (37.1 °C) 98 %      MAP       83.33         Physical Exam    Constitutional: She appears well-developed and well-nourished.   HENT:   Head: Atraumatic.   Mouth/Throat: Oropharynx is clear and moist.   Eyes: Conjunctivae and EOM are  normal. Pupils are equal, round, and reactive to light.   Neck: Normal range of motion. Neck supple.   Cardiovascular: Normal rate and regular rhythm.   Pulmonary/Chest: Breath sounds normal. No respiratory distress. She has no wheezes. She has no rhonchi. She has no rales.   Abdominal: Soft. Bowel sounds are normal. There is tenderness (mild with deep palpation over suprapubic area ).   Neurological: She is alert and oriented to person, place, and time.   Skin: No rash noted.   Psychiatric: She has a normal mood and affect.         ED Course   Procedures  Labs Reviewed   URINALYSIS, REFLEX TO URINE CULTURE - Abnormal; Notable for the following:        Result Value    Appearance, UA Cloudy (*)     Protein, UA 2+ (*)     Ketones, UA Trace (*)     Occult Blood UA 3+ (*)     Nitrite, UA Positive (*)     Leukocytes, UA 2+ (*)     All other components within normal limits    Narrative:     Preferred Collection Type->Urine, Clean Catch  1 cup of urine   URINALYSIS MICROSCOPIC - Abnormal; Notable for the following:     RBC, UA >100 (*)     WBC, UA >100 (*)     Bacteria, UA Few (*)     All other components within normal limits    Narrative:     Preferred Collection Type->Urine, Clean Catch  1 cup of urine   CULTURE, URINE   POCT URINE PREGNANCY                   APC / Resident Notes:   Patient presents to the ER with chief complaint of dysuria, hematuria, suprapubic pressure, urinary frequency and urgency since this morning.  She denies vaginal discharge or bleeding and I do not suspect pelvic infection.  She has no Mcburney's point tenderness or significant abdominal tenderness so I do no suspect appendicitis, diverticulitis, or acute cholecystitis.   Patient's UA is consistent with nitrite positive UTI.  She has no flank pain, vomiting, or fever to suggest pyelonephritis.  The patient had a UTI during pregnancy with resolution after treatment with Macrobid.  She denies history of complicated UTIs.  I will treat with  Macrobid and send urine culture.  Patient is given first dose of antibiotics and Pyridium for symptomatic treatment in the ER.  Patient is afebrile with stable vital signs and is nontoxic appearing.  She is stable for discharge.  She is advised follow-up with her PCP or GYN within one week for ER follow-up exam.I discussed the care of this patient with my supervising MD.                ED Course     Clinical Impression:   The encounter diagnosis was Urinary tract infection with hematuria, site unspecified.                           LORENA Patterson  06/23/17 1133

## 2017-06-26 LAB — BACTERIA UR CULT: NORMAL

## 2017-09-03 ENCOUNTER — HOSPITAL ENCOUNTER (EMERGENCY)
Facility: HOSPITAL | Age: 22
Discharge: HOME | End: 2017-09-03
Attending: FAMILY MEDICINE
Payer: MEDICAID

## 2017-09-03 VITALS
BODY MASS INDEX: 23.91 KG/M2 | WEIGHT: 135 LBS | HEART RATE: 80 BPM | SYSTOLIC BLOOD PRESSURE: 109 MMHG | RESPIRATION RATE: 20 BRPM | TEMPERATURE: 99 F | DIASTOLIC BLOOD PRESSURE: 63 MMHG

## 2017-09-03 DIAGNOSIS — J06.9 UPPER RESPIRATORY TRACT INFECTION, UNSPECIFIED TYPE: Primary | ICD-10-CM

## 2017-09-03 PROCEDURE — 63600175 PHARM REV CODE 636 W HCPCS: Performed by: FAMILY MEDICINE

## 2017-09-03 PROCEDURE — 96372 THER/PROPH/DIAG INJ SC/IM: CPT

## 2017-09-03 PROCEDURE — 99283 EMERGENCY DEPT VISIT LOW MDM: CPT | Mod: 25

## 2017-09-03 RX ADMIN — METHYLPREDNISOLONE SODIUM SUCCINATE 125 MG: 125 INJECTION, POWDER, FOR SOLUTION INTRAMUSCULAR; INTRAVENOUS at 07:09

## 2017-09-04 NOTE — ED NOTES
Discharged to home/self care.    - Condition at discharge: Good  - Mode of Discharge: Ambulatory  - The patient left the ED accompanied by a friend.  - The discharge instructions were discussed with the patient.  - They state an understanding of the discharge instructions.  - Walked pt to the discharge station.

## 2017-09-04 NOTE — ED TRIAGE NOTES
22 y.o. female presents to ER ED 01/ED 01A   Chief Complaint   Patient presents with    Sinusitis   pt c/o swollen eyes, nasal congestion, and sneezing. NADN.

## 2017-09-04 NOTE — ED PROVIDER NOTES
Encounter Date: 9/3/2017       History     Chief Complaint   Patient presents with    Sinusitis     The history is provided by the patient. No  was used.   URI   Primary symptoms do not include fever, fatigue, headaches, ear pain, sore throat, swollen glands, cough, wheezing, abdominal pain, nausea, vomiting, myalgias, arthralgias or rash. The current episode started yesterday. This is a new problem.   Symptoms associated with the illness include facial pain and sinus pressure. The illness is not associated with chills, plugged ear sensation, congestion or rhinorrhea. The following treatments were addressed: Acetaminophen was not tried. A decongestant was not tried. Aspirin was not tried. NSAIDs were not tried.     Patient had onset yesterday of nasal congestion.  She also had some crusting in her left eye.  She then began to have lots of sneezing and was sent home from work.  No coughing fever or chills.  She does not smoke cigarettes.  She took Benadryl.  She has a history of sinus congestion.  Bilateral facial pressure.    Review of patient's allergies indicates:  No Known Allergies  History reviewed. No pertinent past medical history.  History reviewed. No pertinent surgical history.  History reviewed. No pertinent family history.  Social History   Substance Use Topics    Smoking status: Never Smoker    Smokeless tobacco: Not on file    Alcohol use No     Review of Systems   Constitutional: Negative for chills, fatigue and fever.   HENT: Positive for sinus pressure. Negative for congestion, ear pain, rhinorrhea and sore throat.    Respiratory: Negative for cough and wheezing.    Gastrointestinal: Negative for abdominal pain, nausea and vomiting.   Musculoskeletal: Negative for arthralgias and myalgias.   Skin: Negative for rash.   Neurological: Negative for headaches.       Physical Exam     Initial Vitals [09/03/17 1903]   BP Pulse Resp Temp SpO2   109/63 80 20 98.8 °F (37.1 °C) --       MAP       78.33         Physical Exam    Nursing note and vitals reviewed.  Constitutional: She appears well-developed and well-nourished.   HENT:   Head: Normocephalic and atraumatic.   Right Ear: External ear normal.   Left Ear: External ear normal.   Nose: Nose normal.   Mouth/Throat: Oropharynx is clear and moist.   Eyes: Conjunctivae and EOM are normal. Pupils are equal, round, and reactive to light. Right eye exhibits no discharge. Left eye exhibits no discharge. No scleral icterus.   Neck: Normal range of motion. Neck supple.   Cardiovascular: Normal rate, regular rhythm and normal heart sounds.   Pulmonary/Chest: Breath sounds normal.   Musculoskeletal: Normal range of motion.   Neurological: She is alert and oriented to person, place, and time. She has normal strength.   Skin: Skin is warm and dry.   Psychiatric: She has a normal mood and affect.         ED Course   Procedures  Labs Reviewed - No data to display                            ED Course      Clinical Impression:   The encounter diagnosis was Upper respiratory tract infection, unspecified type.                           Casa Ahn MD  09/03/17 5586

## 2018-01-30 ENCOUNTER — TELEPHONE (OUTPATIENT)
Dept: OBSTETRICS AND GYNECOLOGY | Facility: CLINIC | Age: 23
End: 2018-01-30

## 2018-01-30 DIAGNOSIS — Z34.90 PREGNANCY, UNSPECIFIED GESTATIONAL AGE: Primary | ICD-10-CM

## 2018-02-01 ENCOUNTER — PROCEDURE VISIT (OUTPATIENT)
Dept: OBSTETRICS AND GYNECOLOGY | Facility: CLINIC | Age: 23
End: 2018-02-01
Payer: MEDICAID

## 2018-02-01 ENCOUNTER — OFFICE VISIT (OUTPATIENT)
Dept: OBSTETRICS AND GYNECOLOGY | Facility: CLINIC | Age: 23
End: 2018-02-01
Payer: MEDICAID

## 2018-02-01 VITALS
BODY MASS INDEX: 23.32 KG/M2 | HEIGHT: 63 IN | WEIGHT: 131.63 LBS | DIASTOLIC BLOOD PRESSURE: 70 MMHG | SYSTOLIC BLOOD PRESSURE: 110 MMHG

## 2018-02-01 DIAGNOSIS — Z34.91 NORMAL PREGNANCY IN FIRST TRIMESTER: Primary | ICD-10-CM

## 2018-02-01 DIAGNOSIS — N91.1 SECONDARY AMENORRHEA: ICD-10-CM

## 2018-02-01 DIAGNOSIS — O21.9 NAUSEA AND VOMITING IN PREGNANCY: ICD-10-CM

## 2018-02-01 DIAGNOSIS — Z36.89 ESTABLISH GESTATIONAL AGE, ULTRASOUND: ICD-10-CM

## 2018-02-01 DIAGNOSIS — Z34.90 PREGNANCY, UNSPECIFIED GESTATIONAL AGE: ICD-10-CM

## 2018-02-01 LAB
B-HCG UR QL: POSITIVE
CTP QC/QA: YES

## 2018-02-01 PROCEDURE — 99999 PR PBB SHADOW E&M-EST. PATIENT-LVL III: CPT | Mod: PBBFAC,,, | Performed by: OBSTETRICS & GYNECOLOGY

## 2018-02-01 PROCEDURE — 99213 OFFICE O/P EST LOW 20 MIN: CPT | Mod: PBBFAC,25,TH | Performed by: OBSTETRICS & GYNECOLOGY

## 2018-02-01 PROCEDURE — 99214 OFFICE O/P EST MOD 30 MIN: CPT | Mod: TH,S$PBB,, | Performed by: OBSTETRICS & GYNECOLOGY

## 2018-02-01 PROCEDURE — 81025 URINE PREGNANCY TEST: CPT | Mod: PBBFAC | Performed by: OBSTETRICS & GYNECOLOGY

## 2018-02-01 PROCEDURE — 3008F BODY MASS INDEX DOCD: CPT | Mod: ,,, | Performed by: OBSTETRICS & GYNECOLOGY

## 2018-02-01 PROCEDURE — 87491 CHLMYD TRACH DNA AMP PROBE: CPT

## 2018-02-01 PROCEDURE — 87086 URINE CULTURE/COLONY COUNT: CPT

## 2018-02-01 PROCEDURE — 76801 OB US < 14 WKS SINGLE FETUS: CPT | Mod: PBBFAC | Performed by: OBSTETRICS & GYNECOLOGY

## 2018-02-01 PROCEDURE — 76801 OB US < 14 WKS SINGLE FETUS: CPT | Mod: 26,S$PBB,, | Performed by: OBSTETRICS & GYNECOLOGY

## 2018-02-01 RX ORDER — PYRIDOXINE HCL (VITAMIN B6) 25 MG
25 TABLET ORAL DAILY
Refills: 0 | COMMUNITY
Start: 2018-02-01 | End: 2018-02-18

## 2018-02-01 RX ORDER — ONDANSETRON 4 MG/1
4 TABLET, ORALLY DISINTEGRATING ORAL EVERY 6 HOURS PRN
Qty: 30 TABLET | Refills: 1 | Status: SHIPPED | OUTPATIENT
Start: 2018-02-01 | End: 2018-03-02 | Stop reason: SDUPTHER

## 2018-02-01 NOTE — PROCEDURES
Procedures   Obstetrical ultrasound completed today.  See report in imaging section of [facility].

## 2018-02-01 NOTE — PROGRESS NOTES
Tosin Mckay is a 22 y.o. , presents today for initial OB visit. Patient previously saw Dr. Munson but wants to transfer care here. Dating U/S was done at 8 weeks.     HPI:  Patient's last menstrual period was 2017. Prior to LMP, menses were regular. Reports nausea and vomiting. Reports that she has lost 4 lbs in the past month. States that she has been unable to keep down food or liquid for the past week. Has noticed breast tenderness. Denies vaginal bleeding since LMP.    Patient had an  1 year ago at [facility], complicated by PPH requiring 2 u pRBCs and then after discharge complicated by delayed pp hemorrhage for which she presented to our facility and was treated medically with cytotec.    SOCIAL HISTORY: Denies emotional/mental/physical/sexual violence or abuse. Feels safe at home. Accompanied today by her friend.     PAP HISTORY: N/A    Review of patient's allergies indicates:  No Known Allergies  No past medical history on file.  No past surgical history on file.  No past surgical history on file.  OB History    Para Term  AB Living   3 2       2   SAB TAB Ectopic Multiple Live Births           2      # Outcome Date GA Lbr Colby/2nd Weight Sex Delivery Anes PTL Lv   3 Current            2 Para 17    F Vag-Spont   JIM   1 Para /    M Vag-Spont   JIM        OB History      Para Term  AB Living    3 2       2    SAB TAB Ectopic Multiple Live Births            2        Social History     Social History    Marital status: Single     Spouse name: N/A    Number of children: N/A    Years of education: N/A     Occupational History    Not on file.     Social History Main Topics    Smoking status: Never Smoker    Smokeless tobacco: Never Used    Alcohol use No    Drug use: No    Sexual activity: No     Other Topics Concern    Not on file     Social History Narrative    No narrative on file     Family History   Problem Relation Age of Onset     Hypertension Father     Breast cancer Neg Hx     Colon cancer Neg Hx     Diabetes Neg Hx     Eclampsia Neg Hx     Stroke Neg Hx     Miscarriages / Stillbirths Neg Hx     Ovarian cancer Neg Hx      History   Sexual Activity    Sexual activity: No       GENETIC SCREENING   Patient's age 35 years or older as of estimated date of delivery? no  Nural tube defect (meningomyelocele, spina bifida, or anencephaly)? no  Down syndrome? no  Andriy-Sachs (Ashkenazi Restorationism, Cajun, Pashto Rodanthe)? no  Canavan disease (Ashkenazi Restorationism)? no  Familial dysautonomia (Ashkenazi Restorationism)? no  Sickle cell disease or trait ()? no  Hemophilia or other blood disorders? no  Cystic fibrosis? no  Muscular dystrophy? no  Miner's chorea? no  Thalassemia (Italian, Greek, Mediterranean, or  background) MCV less than 80? no  Congenital heart defect? no  Mental retardation/autism? no   If Yes, was person tested for Fragile X? no  Other inherited genetic or chromosomal disorder? no  Maternal metabolic disorder (e.g. type 1 diabetes, PKU)? no  Patient or baby's father had a child with birth defects not listed above? no  Recurrent pregnancy loss or a stillbirth: no  Medications (including supplements, vitamins, herbs or OTC drugs)/illicit/recreational drugs/alcohol since last menstrual period? no   If yes, agent(s) and strength/dose: no  List any other genetic risks: no  Comments/counseling: no    INFECTION HISTORY  Live with someone with TB or exposed to TB: no  Patient or partner has history of genital herpes: no  Rash or viral illness since last menstrual period: no  Patient or partner has hepatitis B or C: no  History of STD, gonorrhea, chlamydia, HPV, HIV, syphilis (list all that apply): no  List other infections: no  Additional comments: none    Primary Doctor No     ROS:    Constitutional/Gen: Denies fevers, chills, malaise, or weight loss. Pos fatigue   Psych: Denies depression, anxiety  Eyes: Denies changes in vision or  "scotomata  Ears, nose, mouth, throat: Denies sinus tenderness, swelling, or dentition problems  CV/vasc: Denies heart palpitations or edema  Resp: Denies SOB or dyspnea  Breasts: Denies mass, nipple discharge, or trauma. Pos breast tenderness.  GI: Denies constipation, diarrhea, or vomiting. Pos nausea.  : Denies vaginal discharge, dysuria or pelvic pain. Denies urinary frequency  MS: Denies weakness, soreness, or changes in ROM    OBJECTIVE:  /70   Ht 5' 3" (1.6 m)   Wt 59.7 kg (131 lb 9.8 oz)   LMP 2017   Breastfeeding? No   BMI 23.31 kg/m²   Constitutional/Gen: NAD, appears stated age, well groomed  Neck: supple, no masses or enlargement  Head: normocephalic  Skin: warm and dry w/o rash  Lung: normal resp effort, CTAB  Heart: normal HR, RRR   Back: negative CVAT  Breasts: bilaterally--no masses, tenderness, skin changes, or nipple discharge noted  Abdomen: soft, nontender, no masses, and bowel sounds normal, no enlargement  External genitalia: no lesions or discharge, normal hair distribution  Urethral meatus: normal size and location, no lesions or prolapse  Vagina: normal appearance, no lesions, no discharge, no evidence cystocele or rectocele.  Cervix: normal appearance, no discharge, no lesions, negative CMT  Uterus: nontender, mobile, approx 12-14 week size, contour, and position.  Adnexa: no masses or tenderness  Anus/Perineum: normal appearance, with no lesions or discharge. Internal exam deferred.  Extremities: FROM, with no edema or tenderness.  Neurologic: A&O x 4, non-focal, cranial nerves 2-12 grossly intact  Psych: affect appropriate and without signs of mood, thought or memory difficulty appreciated    UPT pos in office    ASSESSMENT:  22 y.o. female  with amenorrhea  Likely at 13w0d via LMP; S=D  Body mass index is 23.31 kg/m².  Patient Active Problem List   Diagnosis    Thickened endometrium    Postpartum bleeding       PLAN:  1. Amenorrhea  -- + UPT in office, Patient's " last menstrual period was 11/02/2017. --> Estimated Date of Delivery: 8/9/18.  -- Dating US done  -- Anatomical US 19-20 weeks  -- Routine serum and urine prenatal labs today    2. Physical exam today  -- Pap not indicated.     3. BMI   -- Discussed IOM recommended weight gain of:   Normal Weight 18.5-24.9  25-35     4. Discussed nausea and vomiting in pregnancy  -- Education regarding lifestyle and dietary modifications  -- Reviewed use of B6/Unisom prn. Rx for zofran given. States reglan that was given by her prior OB is not helping. Pt will notify us if no relief/worsening symptoms, will consider alternative therapies prn.    5. Pregnancy education and couseling; handouts and booklet provided  -- Oriented to practice and anticipated prenatal course of care and how to contact us  -- Precautions/warning signs reviewed  -- Common complaints of pregnancy  -- Routine prenatal labs including HIV  -- Ultrasounds  -- Nutrition, prepregnant BMI, and recommended weight gain  -- Toxoplasmosis precautions (Cats/Raw Meat)  -- Sexual activity and exercise  -- Environmental/Work hazards  -- Travel  -- Tobacco (Ask, Advise, Assess, Assist, and Arrange), as well as alcohol and drug use  -- Use of any medications (Including supplements, Vitamins, Herbs, or OTC Drugs)  -- Domestic violence screen neg.     6. Reviewed genetic testing options. Reviewed available first trimester and/or second  trimester screening options. Reviewed risk of false positive/negative results and recommendation of referral to [facility] in event of a positive result, for NIPT, US, and/or amniocentesis. Reviewed the possible estimated 1 in 300/500 risk of miscarriage with amniocentesis, even with a healthy fetus. Patient desires genetic screening. Will order quad screen to be drawn at next visit.    RTC x 4 weeks, call or present sooner prn.     Updated Medication List:  No current outpatient prescriptions on file.     No current facility-administered medications  for this visit.      Sophie Mayorga MD  2/1/2018 2:52 PM

## 2018-02-02 LAB
C TRACH DNA SPEC QL NAA+PROBE: NOT DETECTED
N GONORRHOEA DNA SPEC QL NAA+PROBE: NOT DETECTED

## 2018-02-04 LAB — BACTERIA UR CULT: NO GROWTH

## 2018-02-12 ENCOUNTER — LAB VISIT (OUTPATIENT)
Dept: LAB | Facility: OTHER | Age: 23
End: 2018-02-12
Attending: OBSTETRICS & GYNECOLOGY
Payer: MEDICAID

## 2018-02-12 DIAGNOSIS — Z34.91 NORMAL PREGNANCY IN FIRST TRIMESTER: ICD-10-CM

## 2018-02-12 LAB
ABO + RH BLD: NORMAL
BASOPHILS # BLD AUTO: 0.02 K/UL
BASOPHILS NFR BLD: 0.3 %
BLD GP AB SCN CELLS X3 SERPL QL: NORMAL
DIFFERENTIAL METHOD: ABNORMAL
EOSINOPHIL # BLD AUTO: 0.1 K/UL
EOSINOPHIL NFR BLD: 1.3 %
ERYTHROCYTE [DISTWIDTH] IN BLOOD BY AUTOMATED COUNT: 13.8 %
HCT VFR BLD AUTO: 33.7 %
HGB BLD-MCNC: 11.4 G/DL
LYMPHOCYTES # BLD AUTO: 2 K/UL
LYMPHOCYTES NFR BLD: 25.6 %
MCH RBC QN AUTO: 27.1 PG
MCHC RBC AUTO-ENTMCNC: 33.8 G/DL
MCV RBC AUTO: 80 FL
MONOCYTES # BLD AUTO: 0.4 K/UL
MONOCYTES NFR BLD: 5.1 %
NEUTROPHILS # BLD AUTO: 5.3 K/UL
NEUTROPHILS NFR BLD: 67.6 %
PLATELET # BLD AUTO: 241 K/UL
PMV BLD AUTO: 10.2 FL
RBC # BLD AUTO: 4.2 M/UL
WBC # BLD AUTO: 7.81 K/UL

## 2018-02-12 PROCEDURE — 85025 COMPLETE CBC W/AUTO DIFF WBC: CPT

## 2018-02-12 PROCEDURE — 86901 BLOOD TYPING SEROLOGIC RH(D): CPT

## 2018-02-12 PROCEDURE — 87340 HEPATITIS B SURFACE AG IA: CPT

## 2018-02-12 PROCEDURE — 81220 CFTR GENE COM VARIANTS: CPT

## 2018-02-12 PROCEDURE — 83020 HEMOGLOBIN ELECTROPHORESIS: CPT

## 2018-02-12 PROCEDURE — 86703 HIV-1/HIV-2 1 RESULT ANTBDY: CPT

## 2018-02-12 PROCEDURE — 36415 COLL VENOUS BLD VENIPUNCTURE: CPT

## 2018-02-12 PROCEDURE — 86762 RUBELLA ANTIBODY: CPT

## 2018-02-12 PROCEDURE — 86592 SYPHILIS TEST NON-TREP QUAL: CPT

## 2018-02-14 LAB
HBV SURFACE AG SERPL QL IA: NEGATIVE
HIV 1+2 AB+HIV1 P24 AG SERPL QL IA: NEGATIVE
RPR SER QL: NORMAL
RUBV IGG SER-ACNC: <5 IU/ML
RUBV IGG SER-IMP: ABNORMAL

## 2018-02-16 ENCOUNTER — TELEPHONE (OUTPATIENT)
Dept: OBSTETRICS AND GYNECOLOGY | Facility: CLINIC | Age: 23
End: 2018-02-16

## 2018-02-16 DIAGNOSIS — D50.9 IRON DEFICIENCY ANEMIA DURING PREGNANCY: Primary | ICD-10-CM

## 2018-02-16 DIAGNOSIS — O99.019 IRON DEFICIENCY ANEMIA DURING PREGNANCY: Primary | ICD-10-CM

## 2018-02-16 LAB
HGB A2 MFR BLD HPLC: 2.6 %
HGB FRACT BLD ELPH-IMP: NORMAL
HGB FRACT BLD ELPH-IMP: NORMAL

## 2018-02-16 RX ORDER — DOCUSATE SODIUM 100 MG/1
100 CAPSULE, LIQUID FILLED ORAL 2 TIMES DAILY
Qty: 30 CAPSULE | Refills: 3 | Status: SHIPPED | OUTPATIENT
Start: 2018-02-16 | End: 2018-02-18

## 2018-02-16 RX ORDER — FERROUS SULFATE 325(65) MG
325 TABLET ORAL DAILY
Qty: 30 TABLET | Refills: 3 | Status: SHIPPED | OUTPATIENT
Start: 2018-02-16 | End: 2019-02-05

## 2018-02-16 NOTE — TELEPHONE ENCOUNTER
Called patient to notify of lab results. RNI. Likely iron deficiency anemia - need to discuss iron supplementation.     No answer, left message.

## 2018-02-18 ENCOUNTER — HOSPITAL ENCOUNTER (EMERGENCY)
Facility: OTHER | Age: 23
Discharge: HOME | End: 2018-02-18
Attending: EMERGENCY MEDICINE
Payer: MEDICAID

## 2018-02-18 VITALS
TEMPERATURE: 98 F | HEIGHT: 63 IN | WEIGHT: 135 LBS | DIASTOLIC BLOOD PRESSURE: 55 MMHG | RESPIRATION RATE: 17 BRPM | HEART RATE: 85 BPM | BODY MASS INDEX: 23.92 KG/M2 | OXYGEN SATURATION: 97 % | SYSTOLIC BLOOD PRESSURE: 108 MMHG

## 2018-02-18 DIAGNOSIS — R10.2 PELVIC PAIN IN PREGNANCY: Primary | ICD-10-CM

## 2018-02-18 DIAGNOSIS — O26.899 PELVIC PAIN IN PREGNANCY: Primary | ICD-10-CM

## 2018-02-18 LAB
BACTERIA #/AREA URNS HPF: NORMAL /HPF
BILIRUB UR QL STRIP: NEGATIVE
CLARITY UR: CLEAR
COLOR UR: YELLOW
GLUCOSE UR QL STRIP: NEGATIVE
HGB UR QL STRIP: NEGATIVE
KETONES UR QL STRIP: NEGATIVE
LEUKOCYTE ESTERASE UR QL STRIP: ABNORMAL
MICROSCOPIC COMMENT: NORMAL
NITRITE UR QL STRIP: NEGATIVE
PH UR STRIP: 7 [PH] (ref 5–8)
PROT UR QL STRIP: NEGATIVE
SP GR UR STRIP: 1.01 (ref 1–1.03)
SQUAMOUS #/AREA URNS HPF: 6 /HPF
URN SPEC COLLECT METH UR: ABNORMAL
UROBILINOGEN UR STRIP-ACNC: 1 EU/DL
WBC #/AREA URNS HPF: 5 /HPF (ref 0–5)

## 2018-02-18 PROCEDURE — 81000 URINALYSIS NONAUTO W/SCOPE: CPT

## 2018-02-18 PROCEDURE — 99283 EMERGENCY DEPT VISIT LOW MDM: CPT

## 2018-02-19 LAB — CFTR MUT ANL BLD/T: ABNORMAL

## 2018-02-19 NOTE — ED PROVIDER NOTES
Encounter Date: 2/18/2018       History     Chief Complaint   Patient presents with    Abdominal Pain     pt with lower  abdominal  pain and cramping x week . pt denies bleeding or discharge.     22-year-old female with history of rubella who is approximately 15 weeks gestation presents emergency department with complaints of suprapubic abdominal pressure and cramping.  She states that her symptoms been present for a week.  She denies any bleeding or discharge.  She denies fever, chills or urinary symptoms.  No current treatment.  She complains of pain that is a 4 out of 10.  She states that this is her third pregnancy and reports that she is 2 children at home however she reports that one of her pregnancies was initially trend gestation with the loss of one of the fetuses.  She does report hemorrhaging after her first pregnancy.  She denies any complications a sore with this pregnancy.  She states that her next appointment with OB/GYN is March 2      The history is provided by the patient.     Review of patient's allergies indicates:  No Known Allergies  Past Medical History:   Diagnosis Date    Rubella non-immune status, antepartum      History reviewed. No pertinent surgical history.  Family History   Problem Relation Age of Onset    Hypertension Father     Breast cancer Neg Hx     Colon cancer Neg Hx     Diabetes Neg Hx     Eclampsia Neg Hx     Stroke Neg Hx     Miscarriages / Stillbirths Neg Hx     Ovarian cancer Neg Hx      Social History   Substance Use Topics    Smoking status: Never Smoker    Smokeless tobacco: Never Used    Alcohol use No     Review of Systems   Constitutional: Negative for chills and fever.   HENT: Negative for sore throat.    Respiratory: Negative for shortness of breath.    Cardiovascular: Negative for chest pain.   Gastrointestinal: Positive for abdominal pain. Negative for diarrhea, nausea and vomiting.   Genitourinary: Negative for difficulty urinating, dysuria, flank  pain, frequency, hematuria, urgency, vaginal bleeding and vaginal discharge.   Musculoskeletal: Negative for back pain.   Skin: Negative for rash.   Neurological: Negative for weakness.   Hematological: Does not bruise/bleed easily.       Physical Exam     Initial Vitals [02/18/18 1824]   BP Pulse Resp Temp SpO2   102/69 90 18 98.2 °F (36.8 °C) 100 %      MAP       80         Physical Exam    Nursing note and vitals reviewed.  Constitutional: Vital signs are normal. She appears well-developed and well-nourished.  Non-toxic appearance. No distress.   HENT:   Head: Normocephalic and atraumatic.   Right Ear: External ear normal.   Left Ear: External ear normal.   Nose: Nose normal.   Eyes: Conjunctivae and lids are normal. No scleral icterus.   Neck: Normal range of motion and phonation normal. Neck supple.   Pulmonary/Chest: No respiratory distress.   Abdominal: Soft. Normal appearance and bowel sounds are normal. She exhibits distension (gravid uterus). There is no tenderness. There is no rigidity, no rebound, no guarding, no CVA tenderness, no tenderness at McBurney's point and negative Dia's sign.   Musculoskeletal: Normal range of motion.   No obvious deformities, moving all extremities, normal gait   Neurological: She is alert and oriented to person, place, and time. She has normal strength. No sensory deficit.   Skin: Skin is warm, dry and intact. No lesion and no rash noted. No erythema.   Psychiatric: She has a normal mood and affect. Her speech is normal and behavior is normal. Judgment normal. Cognition and memory are normal.         ED Course   Procedures  Labs Reviewed   URINALYSIS - Abnormal; Notable for the following:        Result Value    Leukocytes, UA 2+ (*)     All other components within normal limits   URINALYSIS MICROSCOPIC             Medical Decision Making:   History:   Old Medical Records: I decided to obtain old medical records.  Initial Assessment:   22-year-old female with complaints  consistent with pelvic pain in pregnancy.  Vital signs stable, afebrile, neurovascular intact.  She is alert and healthy and nontoxic appearing.  She is no apparent distress.  Abdomen is soft and nontender with no evidence of acute surgical abdomen.  Distended abdomen secondary to gravid uterus. No CVA TTP  Clinical Tests:   Lab Tests: Ordered and Reviewed  ED Management:  Bedside ultrasound performed by me.  There is positive fetal movement and heartbeat visualized.  Will obtain urinalysis and fetal heart tones.  Urinalysis shows no evidence of serious bacterial infection, UTI pyelonephritis.  Fetal heart tones 148 bpm.  I do not feel that further workup is indicated.  We'll discharged home with care instructions.  She is to follow-up with her OB/GYN at first available appointment or return for any worsening signs or symptoms.  She states understanding.  This patient was discussed with the attending physician who agrees with treatment plan.  Other:   I have discussed this case with another health care provider.       <> Summary of the Discussion: Jeremiah  This note was created using Dragon Medical dictation.  There may be typographical errors secondary to dictation.                        Clinical Impression:     1. Pelvic pain in pregnancy          Disposition:   Disposition: Discharged  Condition: Stable                        Melany Mohamud PA-C  02/18/18 1931

## 2018-02-20 ENCOUNTER — TELEPHONE (OUTPATIENT)
Dept: OBSTETRICS AND GYNECOLOGY | Facility: CLINIC | Age: 23
End: 2018-02-20

## 2018-02-20 DIAGNOSIS — Z14.1 CYSTIC FIBROSIS CARRIER: ICD-10-CM

## 2018-02-20 NOTE — TELEPHONE ENCOUNTER
Called patient to discuss her labs - recommended iron supplementation with colace. Patient also reports she was seen in the ED last week for cramping. Reports that cramping occurs only after taking zofran. Discussed a common side effect of zofran is constipation which may be contributing to cramping. Recommended stool softener for this as well.     Counseled patient on carrier status positive for CF. Recommended that her partner be treated as well. She will discuss with him and he will either follow up with his PCP vs let me know so I can order the test.     All questions answered.

## 2018-03-02 ENCOUNTER — OFFICE VISIT (OUTPATIENT)
Dept: OBSTETRICS AND GYNECOLOGY | Facility: CLINIC | Age: 23
End: 2018-03-02
Payer: MEDICAID

## 2018-03-02 ENCOUNTER — LAB VISIT (OUTPATIENT)
Dept: LAB | Facility: OTHER | Age: 23
End: 2018-03-02
Attending: OBSTETRICS & GYNECOLOGY
Payer: MEDICAID

## 2018-03-02 VITALS — HEIGHT: 63 IN | WEIGHT: 136.44 LBS | BODY MASS INDEX: 24.18 KG/M2

## 2018-03-02 DIAGNOSIS — O21.9 NAUSEA AND VOMITING IN PREGNANCY: ICD-10-CM

## 2018-03-02 DIAGNOSIS — Z13.79 ENCOUNTER FOR GENETIC SCREENING FOR DOWN SYNDROME: ICD-10-CM

## 2018-03-02 DIAGNOSIS — Z13.79 ENCOUNTER FOR GENETIC SCREENING FOR DOWN SYNDROME: Primary | ICD-10-CM

## 2018-03-02 DIAGNOSIS — Z36.89 ENCOUNTER FOR FETAL ANATOMIC SURVEY: ICD-10-CM

## 2018-03-02 DIAGNOSIS — R11.0 NAUSEA: ICD-10-CM

## 2018-03-02 PROCEDURE — 99213 OFFICE O/P EST LOW 20 MIN: CPT | Mod: TH,S$PBB,, | Performed by: OBSTETRICS & GYNECOLOGY

## 2018-03-02 PROCEDURE — 99213 OFFICE O/P EST LOW 20 MIN: CPT | Mod: PBBFAC,TH | Performed by: OBSTETRICS & GYNECOLOGY

## 2018-03-02 PROCEDURE — 81511 FTL CGEN ABNOR FOUR ANAL: CPT

## 2018-03-02 PROCEDURE — 36415 COLL VENOUS BLD VENIPUNCTURE: CPT

## 2018-03-02 PROCEDURE — 99999 PR PBB SHADOW E&M-EST. PATIENT-LVL III: CPT | Mod: PBBFAC,,, | Performed by: OBSTETRICS & GYNECOLOGY

## 2018-03-02 RX ORDER — ONDANSETRON 4 MG/1
4 TABLET, ORALLY DISINTEGRATING ORAL EVERY 6 HOURS PRN
Qty: 30 TABLET | Refills: 1 | Status: SHIPPED | OUTPATIENT
Start: 2018-03-02 | End: 2018-05-27 | Stop reason: SDUPTHER

## 2018-03-02 NOTE — PROGRESS NOTES
Discussed CF carrier status. Patient states that her first child's father was also a carrier. Patient's current partner was not tested and was recently incarcerated, unsure of how long he will be in prison.   Quad today. Anatomy U/S ordered. F/U 4 weeks.

## 2018-03-05 ENCOUNTER — TELEPHONE (OUTPATIENT)
Dept: OBSTETRICS AND GYNECOLOGY | Facility: CLINIC | Age: 23
End: 2018-03-05

## 2018-03-05 LAB
# FETUSES US: NORMAL
2ND TRIMESTER 4 SCREEN PNL SERPL: NEGATIVE
2ND TRIMESTER 4 SCREEN SERPL-IMP: NORMAL
AFP MOM SERPL: 0.77
AFP SERPL-MCNC: 31.9 NG/ML
AGE AT DELIVERY: 20
B-HCG MOM SERPL: 0.83
B-HCG SERPL-ACNC: 24.2 IU/ML
FET TS 21 RISK FROM MAT AGE: NORMAL
GA (DAYS): 1 D
GA (WEEKS): 17 WK
GA METHOD: NORMAL
IDDM PATIENT QL: NORMAL
INHIBIN A MOM SERPL: 0.83
INHIBIN A SERPL-MCNC: 131 PG/ML
SMOKING STATUS FTND: NO
TS 18 RISK FETUS: NORMAL
TS 21 RISK FETUS: NORMAL
U ESTRIOL MOM SERPL: 1.14
U ESTRIOL SERPL-MCNC: 1.21 NG/ML

## 2018-03-05 NOTE — TELEPHONE ENCOUNTER
Called patient to schedule 4 week routine ob follow up. No answer. Left voicemail for patient to give our office a call back.

## 2018-03-05 NOTE — TELEPHONE ENCOUNTER
----- Message from Debby Hayes MA sent at 3/2/2018  5:14 PM CST -----  OB pt needing a call back, regarding her 4 week appt, (should be around the end of the month) I do not have override access.

## 2018-03-15 ENCOUNTER — OFFICE VISIT (OUTPATIENT)
Dept: MATERNAL FETAL MEDICINE | Facility: CLINIC | Age: 23
End: 2018-03-15
Payer: MEDICAID

## 2018-03-15 DIAGNOSIS — Z36.89 ENCOUNTER FOR FETAL ANATOMIC SURVEY: ICD-10-CM

## 2018-03-15 PROCEDURE — 76805 OB US >/= 14 WKS SNGL FETUS: CPT | Mod: PBBFAC | Performed by: OBSTETRICS & GYNECOLOGY

## 2018-03-15 PROCEDURE — 76805 OB US >/= 14 WKS SNGL FETUS: CPT | Mod: 26,S$PBB,, | Performed by: OBSTETRICS & GYNECOLOGY

## 2018-03-15 PROCEDURE — 99499 UNLISTED E&M SERVICE: CPT | Mod: S$PBB,,, | Performed by: OBSTETRICS & GYNECOLOGY

## 2018-03-15 NOTE — LETTER
March 16, 2018      Sophie Mayorga MD  [address] [address] [address]           [facility]  [address]  [facility] [address]  Phone: [phone]          Patient: Tosin Mckay   MR Number: [id number]   YOB: 1995   Date of Visit: 3/15/2018       Dear Dr. Sophie Mayorga:    Thank you for referring Toisn Mckay to me for evaluation. Attached you will find relevant portions of my assessment and plan of care.    If you have questions, please do not hesitate to call me. I look forward to following Tosin Mckay along with you.    Sincerely,    Evelio Fischer MD    Enclosure  CC:  No Recipients    If you would like to receive this communication electronically, please contact [email or web address] or [phone] to request more information on [facility] access.    For providers and/or their staff who would like to refer a patient to [facility], please contact us through our one-stop-shop provider referral line, [facility], at [phone].    If you feel you have received this communication in error or would no longer like to receive these types of communications, please e-mail [email or web address]

## 2018-03-16 NOTE — PROGRESS NOTES
Obstetrical ultrasound completed today.  See report in imaging section of [facility].

## 2018-04-20 ENCOUNTER — TELEPHONE (OUTPATIENT)
Dept: OBSTETRICS AND GYNECOLOGY | Facility: CLINIC | Age: 23
End: 2018-04-20

## 2018-04-20 NOTE — TELEPHONE ENCOUNTER
Sent message to  to schedule Patient can be scheduled  For   Tuesday April 24th 8:15 AM ,1:30PM,2:15PM  Thursday April 26th 8:45AM , 9:00AM,1:00PM,1:15PM,1:45PM

## 2018-04-20 NOTE — TELEPHONE ENCOUNTER
----- Message from Debbie Baez sent at 4/20/2018  1:53 PM CDT -----  Contact: Yaneli  Name of Who is Calling: Yaneli      What is the request in detail: Patient needs to schedule her routine ob appointment there are not any available       Can the clinic reply by [facility]: no      What Number to Call Back if not in [facility]: [phone]

## 2018-05-07 ENCOUNTER — PATIENT MESSAGE (OUTPATIENT)
Dept: OBSTETRICS AND GYNECOLOGY | Facility: CLINIC | Age: 23
End: 2018-05-07

## 2018-05-08 ENCOUNTER — HOSPITAL ENCOUNTER (EMERGENCY)
Facility: OTHER | Age: 23
Discharge: HOME | End: 2018-05-08
Attending: OBSTETRICS & GYNECOLOGY
Payer: MEDICAID

## 2018-05-08 VITALS
TEMPERATURE: 97 F | SYSTOLIC BLOOD PRESSURE: 108 MMHG | HEART RATE: 94 BPM | RESPIRATION RATE: 18 BRPM | DIASTOLIC BLOOD PRESSURE: 51 MMHG

## 2018-05-08 DIAGNOSIS — R55 SYNCOPE, UNSPECIFIED SYNCOPE TYPE: Primary | ICD-10-CM

## 2018-05-08 DIAGNOSIS — R55 SYNCOPAL EPISODES: ICD-10-CM

## 2018-05-08 DIAGNOSIS — Z3A.26 26 WEEKS GESTATION OF PREGNANCY: ICD-10-CM

## 2018-05-08 LAB
ALBUMIN SERPL BCP-MCNC: 2.7 G/DL
ALP SERPL-CCNC: 65 U/L
ALT SERPL W/O P-5'-P-CCNC: 8 U/L
ANION GAP SERPL CALC-SCNC: 7 MMOL/L
AST SERPL-CCNC: 14 U/L
BASOPHILS # BLD AUTO: 0.02 K/UL
BASOPHILS NFR BLD: 0.2 %
BILIRUB SERPL-MCNC: 0.3 MG/DL
BUN SERPL-MCNC: 8 MG/DL
CALCIUM SERPL-MCNC: 8.4 MG/DL
CHLORIDE SERPL-SCNC: 108 MMOL/L
CO2 SERPL-SCNC: 20 MMOL/L
CREAT SERPL-MCNC: 0.6 MG/DL
DIFFERENTIAL METHOD: ABNORMAL
EOSINOPHIL # BLD AUTO: 0.1 K/UL
EOSINOPHIL NFR BLD: 0.7 %
ERYTHROCYTE [DISTWIDTH] IN BLOOD BY AUTOMATED COUNT: 13.2 %
EST. GFR  (AFRICAN AMERICAN): >60 ML/MIN/1.73 M^2
EST. GFR  (NON AFRICAN AMERICAN): >60 ML/MIN/1.73 M^2
GLUCOSE SERPL-MCNC: 76 MG/DL
HCT VFR BLD AUTO: 28.9 %
HGB BLD-MCNC: 9.6 G/DL
LYMPHOCYTES # BLD AUTO: 1.5 K/UL
LYMPHOCYTES NFR BLD: 18.1 %
MCH RBC QN AUTO: 28.2 PG
MCHC RBC AUTO-ENTMCNC: 33.2 G/DL
MCV RBC AUTO: 85 FL
MONOCYTES # BLD AUTO: 0.4 K/UL
MONOCYTES NFR BLD: 4.7 %
NEUTROPHILS # BLD AUTO: 6.5 K/UL
NEUTROPHILS NFR BLD: 76.1 %
PLATELET # BLD AUTO: 200 K/UL
PMV BLD AUTO: 9.4 FL
POCT GLUCOSE: 75 MG/DL (ref 70–110)
POTASSIUM SERPL-SCNC: 4.1 MMOL/L
PROT SERPL-MCNC: 6 G/DL
RBC # BLD AUTO: 3.41 M/UL
SODIUM SERPL-SCNC: 135 MMOL/L
WBC # BLD AUTO: 8.52 K/UL

## 2018-05-08 PROCEDURE — 82962 GLUCOSE BLOOD TEST: CPT

## 2018-05-08 PROCEDURE — 80053 COMPREHEN METABOLIC PANEL: CPT

## 2018-05-08 PROCEDURE — 99284 EMERGENCY DEPT VISIT MOD MDM: CPT | Mod: 25,,, | Performed by: OBSTETRICS & GYNECOLOGY

## 2018-05-08 PROCEDURE — 59025 FETAL NON-STRESS TEST: CPT

## 2018-05-08 PROCEDURE — 85025 COMPLETE CBC W/AUTO DIFF WBC: CPT

## 2018-05-08 PROCEDURE — 99284 EMERGENCY DEPT VISIT MOD MDM: CPT | Mod: 25

## 2018-05-08 PROCEDURE — 59025 FETAL NON-STRESS TEST: CPT | Mod: 26,,, | Performed by: OBSTETRICS & GYNECOLOGY

## 2018-05-08 PROCEDURE — 93005 ELECTROCARDIOGRAM TRACING: CPT | Mod: 59

## 2018-05-08 PROCEDURE — 93010 ELECTROCARDIOGRAM REPORT: CPT | Mod: ,,, | Performed by: INTERNAL MEDICINE

## 2018-05-08 NOTE — ED PROVIDER NOTES
Encounter Date: 2018       History   No chief complaint on file.    Tosin Mckay is a 22 y.o.  at 26w5d presents with complaint of syncopal episode earlier today. Patient had been sitting down and stood up to make herself a meal. She reports feeling lightheaded with cold sweat down her back and white spots in her vision as well as decreased hearing. She does not remember what happened next. The patient's grandmother was present during the episode and told EMS and the patient that she sat down in a chair and then appeared to be falling out of the chair, at which point she was placed on the ground. She did not hit the ground and denies any head or abdominal trauma. She reports a similar episode with near-syncope in February of this year; she states she never actually passed out but felt all of the prodromal symptoms listed above and then sat down and felt better. She reports taking zofran for nausea and states that she had not eaten yet today but reports eating normally with her last meal being yesterday. She has had some water today but admits it was only a little bit. She denies being ill recently and denies fevers, chills, sick contacts, diarrhea, constipation, or abdominal pain. She denies chest pain, palpitations, or shortness of breath, as well as headaches. Patient denies contractions, denies vaginal bleeding, denies LOF.   Fetal Movement: normal.  This IUP is complicated by CF carrier status.          Review of patient's allergies indicates:  Allergies not on file  No past medical history on file.  No past surgical history on file.  No family history on file.  Social History   Substance Use Topics    Smoking status: Not on file    Smokeless tobacco: Not on file    Alcohol use Not on file     Review of Systems   Constitutional: Positive for diaphoresis. Negative for appetite change, chills, fatigue (cold sweat preceding episode of syncope) and fever.   Eyes: Positive for visual disturbance (whtie  spots prior to syncope, none currently).   Respiratory: Negative for shortness of breath.    Cardiovascular: Negative for chest pain and palpitations.   Gastrointestinal: Negative for abdominal pain, constipation, diarrhea, nausea and vomiting.   Genitourinary: Negative for vaginal bleeding and vaginal discharge.   Neurological: Positive for syncope and light-headedness. Negative for headaches.       Physical Exam     Vitals:    05/08/18 1237 05/08/18 1245 05/08/18 1300   BP: (!) 104/59 (!) 115/56 (!) 108/51   Patient Position: Lying Sitting Standing   Pulse: 94     Resp: 18     Temp: 97 °F (36.1 °C)     TempSrc: Temporal           Physical Exam    Vitals reviewed.  Constitutional: She appears well-developed and well-nourished. She is not diaphoretic. No distress.   HENT:   Head: Normocephalic and atraumatic.   Eyes: EOM are normal.   Neck: Normal range of motion.   Cardiovascular: Normal rate, regular rhythm and normal heart sounds.   Pulmonary/Chest: Breath sounds normal. No respiratory distress. She has no wheezes. She has no rhonchi. She has no rales.   Abdominal: Soft. There is no tenderness. There is no rebound and no guarding.   Gravid, fundus NT   Neurological: She is alert and oriented to person, place, and time.   Skin: Skin is warm and dry.   Psychiatric: She has a normal mood and affect. Her behavior is normal. Judgment and thought content normal.         ED Course   Obtain Fetal nonstress test (NST)  Date/Time: 5/8/2018 1:55 PM  Performed by: REDDY, SUSHMA K  Authorized by: REDDY, SUSHMA K     Nonstress Test:     Variability:  6-25 BPM    Decelerations:  None    Accelerations:  10 bpm    Acoustic Stimulator: No      Baseline:  130    Uterine Irritability: No      Contractions:  Not present  Biophysical Profile:     Nonstress Test Interpretation: reactive      Overall Impression:  Reassuring          Labs Reviewed   CBC W/ AUTO DIFFERENTIAL - Abnormal; Notable for the following:        Result Value     RBC 3.41 (*)     Hemoglobin 9.6 (*)     Hematocrit 28.9 (*)     Gran% 76.1 (*)     All other components within normal limits   COMPREHENSIVE METABOLIC PANEL - Abnormal; Notable for the following:     Sodium 135 (*)     CO2 20 (*)     Calcium 8.4 (*)     Albumin 2.7 (*)     ALT 8 (*)     Anion Gap 7 (*)     All other components within normal limits   POCT URINALYSIS, DIPSTICK OR TABLET REAGENT, AUTOMATED, WITH MICROSCOP   POCT GLUCOSE   POCT GLUCOSE MONITORING CONTINUOUS             Medical Decision Making:   ED Management:  NST started on arrival. Unable to put into system. Recorded in this note above.  VSS.  Accucheck 76  Orthostatics negative.  CBC shows anemia 9.6/28.9, known anemia; patient on iron.  CMP wnl  EKG normal  Patient give sandwich. Feeling well.  Discussed with patient that she should call her doctor if she has any similar episodes occur as she may need further evaluation. Patient voiced understanding and agreement.                 Attending Attestation:   Physician Attestation Statement for Resident:  As the supervising MD   Physician Attestation Statement: I have personally seen and examined this patient.   I agree with the above history. -:   As the supervising MD I agree with the above PE.    As the supervising MD I agree with the above treatment, course, plan, and disposition.  I was personally present during the critical portions of the procedure(s) performed by the resident and was immediately available in the ED to provide services and assistance as needed during the entire procedure.  I have reviewed and agree with the residents interpretation of the following: rhythm strips, lab data and EKG.  I have reviewed the following: old records at this facility.                    ED Course as of May 08 1338   Tue May 08, 2018   1243 I evaluated Ms. Mckay and discussed plan with Dr. Dee.  Pt presents with syncopal episode this morning, and was brought to [facility] via EMS.  Pt states that she has  passed out with both previous pregnancies as well.  No major medical problems/cardiac problems.  She had not eaten anything prior to syncopal episode.    She had an episode of light headedness a month of two ago, but did not pass out.   She was at her grandmothers house, and sat down when she was feeling lightheaded.  She was caught prior to falling to the ground, and did not hit her stomach/head.  She did not lose continence nor appear to seize.   Here, her VSS.  Fetal status reassuring.  No contractions.   RRR, abd gravid, fundus NT   Accucheck 75  Will get CBC, CMP, UA, EKG        [HU]      ED Course User Index  [HU] Kamille Lam DO     Clinical Impression:   The encounter diagnosis was Syncopal episodes.    Disposition:   Disposition: Discharged  Condition: Stable                        Sushma K Reddy, MD  Resident  05/08/18 1410       Kamille Lam DO  05/08/18 0276

## 2018-05-10 ENCOUNTER — PATIENT MESSAGE (OUTPATIENT)
Dept: OBSTETRICS AND GYNECOLOGY | Facility: CLINIC | Age: 23
End: 2018-05-10

## 2018-05-11 ENCOUNTER — PATIENT MESSAGE (OUTPATIENT)
Dept: OBSTETRICS AND GYNECOLOGY | Facility: CLINIC | Age: 23
End: 2018-05-11

## 2018-05-11 DIAGNOSIS — B85.2 LICE: Primary | ICD-10-CM

## 2018-05-14 RX ORDER — MALATHION 0 G/ML
LOTION TOPICAL ONCE
Qty: 59 ML | Refills: 0 | Status: SHIPPED | OUTPATIENT
Start: 2018-05-14 | End: 2018-05-14

## 2018-05-17 ENCOUNTER — ROUTINE PRENATAL (OUTPATIENT)
Dept: OBSTETRICS AND GYNECOLOGY | Facility: CLINIC | Age: 23
End: 2018-05-17
Payer: MEDICAID

## 2018-05-17 ENCOUNTER — TELEPHONE (OUTPATIENT)
Dept: OBSTETRICS AND GYNECOLOGY | Facility: CLINIC | Age: 23
End: 2018-05-17

## 2018-05-17 VITALS
SYSTOLIC BLOOD PRESSURE: 116 MMHG | BODY MASS INDEX: 26.01 KG/M2 | WEIGHT: 146.81 LBS | DIASTOLIC BLOOD PRESSURE: 60 MMHG

## 2018-05-17 DIAGNOSIS — B85.2 LICE: Primary | ICD-10-CM

## 2018-05-17 DIAGNOSIS — Z3A.28 28 WEEKS GESTATION OF PREGNANCY: ICD-10-CM

## 2018-05-17 PROCEDURE — 99213 OFFICE O/P EST LOW 20 MIN: CPT | Mod: TH,S$PBB,, | Performed by: OBSTETRICS & GYNECOLOGY

## 2018-05-17 PROCEDURE — 99999 PR PBB SHADOW E&M-EST. PATIENT-LVL II: CPT | Mod: PBBFAC,,, | Performed by: OBSTETRICS & GYNECOLOGY

## 2018-05-17 PROCEDURE — 99212 OFFICE O/P EST SF 10 MIN: CPT | Mod: PBBFAC | Performed by: OBSTETRICS & GYNECOLOGY

## 2018-05-17 RX ORDER — PERMETHRIN 50 MG/G
CREAM TOPICAL ONCE
Qty: 60 G | Refills: 0 | Status: SHIPPED | OUTPATIENT
Start: 2018-05-17 | End: 2018-05-17

## 2018-05-17 NOTE — PROGRESS NOTES
Good FM. Denies VB, LOF, CTX. Labor, ROM and bleeding precautions. Lice has not resolved. Rx for permethrin cream sent to pharmacy. Tdap and OB glucose next visit. F/U 2 weeks.

## 2018-05-17 NOTE — TELEPHONE ENCOUNTER
Called patient to schedule follow up appointment no answer. Voicemail have not been set up . Patient can be scheduled at  next available

## 2018-05-17 NOTE — TELEPHONE ENCOUNTER
----- Message from Ashli Schultz sent at 5/17/2018  3:04 PM CDT -----  Contact: self  OB pt needing a 2 week appt, pre Dr Mayorga, she can be reached at [phone].

## 2018-05-27 DIAGNOSIS — O21.9 NAUSEA AND VOMITING IN PREGNANCY: ICD-10-CM

## 2018-05-27 PROBLEM — R93.89 THICKENED ENDOMETRIUM: Status: RESOLVED | Noted: 2017-01-26 | Resolved: 2018-05-27

## 2018-05-27 RX ORDER — ONDANSETRON 4 MG/1
4 TABLET, ORALLY DISINTEGRATING ORAL EVERY 6 HOURS PRN
Qty: 30 TABLET | Refills: 1 | Status: ON HOLD | OUTPATIENT
Start: 2018-05-27 | End: 2018-07-16 | Stop reason: HOSPADM

## 2018-05-29 ENCOUNTER — PATIENT MESSAGE (OUTPATIENT)
Dept: OBSTETRICS AND GYNECOLOGY | Facility: CLINIC | Age: 23
End: 2018-05-29

## 2018-05-30 ENCOUNTER — PATIENT MESSAGE (OUTPATIENT)
Dept: OBSTETRICS AND GYNECOLOGY | Facility: CLINIC | Age: 23
End: 2018-05-30

## 2018-05-30 RX ORDER — PERMETHRIN 50 MG/G
CREAM TOPICAL ONCE
Qty: 60 G | Refills: 1 | Status: SHIPPED | OUTPATIENT
Start: 2018-05-30 | End: 2018-05-30

## 2018-06-11 ENCOUNTER — PATIENT MESSAGE (OUTPATIENT)
Dept: OBSTETRICS AND GYNECOLOGY | Facility: CLINIC | Age: 23
End: 2018-06-11

## 2018-06-12 ENCOUNTER — ROUTINE PRENATAL (OUTPATIENT)
Dept: OBSTETRICS AND GYNECOLOGY | Facility: CLINIC | Age: 23
End: 2018-06-12
Payer: MEDICAID

## 2018-06-12 VITALS — DIASTOLIC BLOOD PRESSURE: 64 MMHG | WEIGHT: 147 LBS | BODY MASS INDEX: 26.04 KG/M2 | SYSTOLIC BLOOD PRESSURE: 114 MMHG

## 2018-06-12 DIAGNOSIS — Z34.83 ENCOUNTER FOR SUPERVISION OF OTHER NORMAL PREGNANCY, THIRD TRIMESTER: ICD-10-CM

## 2018-06-12 DIAGNOSIS — Z3A.31 31 WEEKS GESTATION OF PREGNANCY: Primary | ICD-10-CM

## 2018-06-12 DIAGNOSIS — S39.91XA ABDOMINAL TRAUMA, INITIAL ENCOUNTER: ICD-10-CM

## 2018-06-12 PROCEDURE — 99213 OFFICE O/P EST LOW 20 MIN: CPT | Mod: TH,S$PBB,, | Performed by: OBSTETRICS & GYNECOLOGY

## 2018-06-12 PROCEDURE — 99999 PR PBB SHADOW E&M-EST. PATIENT-LVL II: CPT | Mod: PBBFAC,,, | Performed by: OBSTETRICS & GYNECOLOGY

## 2018-06-12 PROCEDURE — 99212 OFFICE O/P EST SF 10 MIN: CPT | Mod: PBBFAC,TH | Performed by: OBSTETRICS & GYNECOLOGY

## 2018-06-18 PROBLEM — Z34.83 ENCOUNTER FOR SUPERVISION OF OTHER NORMAL PREGNANCY, THIRD TRIMESTER: Status: ACTIVE | Noted: 2018-06-18

## 2018-06-18 NOTE — PROGRESS NOTES
"Patient states that she "bumped her belly" over the weekend. States that it was sore but improving. +FM. Otherwise doing well, no CTX, LOF or VB. F/U as scheduled.  "

## 2018-06-26 ENCOUNTER — PATIENT MESSAGE (OUTPATIENT)
Dept: OBSTETRICS AND GYNECOLOGY | Facility: CLINIC | Age: 23
End: 2018-06-26

## 2018-07-03 ENCOUNTER — PATIENT MESSAGE (OUTPATIENT)
Dept: OBSTETRICS AND GYNECOLOGY | Facility: CLINIC | Age: 23
End: 2018-07-03

## 2018-07-03 DIAGNOSIS — B85.2 LICE: Primary | ICD-10-CM

## 2018-07-03 RX ORDER — PERMETHRIN 50 MG/G
CREAM TOPICAL ONCE
Qty: 60 G | Refills: 0 | Status: SHIPPED | OUTPATIENT
Start: 2018-07-03 | End: 2018-07-03

## 2018-07-10 ENCOUNTER — PATIENT MESSAGE (OUTPATIENT)
Dept: OBSTETRICS AND GYNECOLOGY | Facility: CLINIC | Age: 23
End: 2018-07-10

## 2018-07-14 ENCOUNTER — HOSPITAL ENCOUNTER (INPATIENT)
Facility: OTHER | Age: 23
LOS: 4 days | Discharge: HOME | End: 2018-07-18
Attending: OBSTETRICS & GYNECOLOGY | Admitting: OBSTETRICS & GYNECOLOGY
Payer: MEDICAID

## 2018-07-14 DIAGNOSIS — R07.89 CHEST TIGHTNESS: ICD-10-CM

## 2018-07-14 DIAGNOSIS — Z3A.36 36 WEEKS GESTATION OF PREGNANCY: ICD-10-CM

## 2018-07-14 PROCEDURE — 11000001 HC ACUTE MED/SURG PRIVATE ROOM

## 2018-07-14 PROCEDURE — 99285 EMERGENCY DEPT VISIT HI MDM: CPT

## 2018-07-14 NOTE — Clinical Note
I certify that Inpatient services for greater than or equal to 2 midnights are medically necessary:: Yes   Transfer To (Destination): [facility] LABOR AND DELIVERY [[id number]]   Diagnosis: Rupture of membranes with clear amniotic fluid [0321805]   Admitting Provider:: NATHALIE FALL [[id number]]   Future Attending Provider: CHAGO CADET [[id number]]   Bed Type Preference: Standard [6]   Reason for IP Medical Treatment  (Clinical interventions that can only be accomplished in the IP setting? ) :: labor   Estimated Length of Stay:: 2 midnights   Plans for Post-Acute care--if anticipated (pick the single best option):: A. No post acute care anticipated at this time   Special Needs:: No Special Needs [1]

## 2018-07-15 ENCOUNTER — ANESTHESIA (OUTPATIENT)
Dept: OBSTETRICS AND GYNECOLOGY | Facility: OTHER | Age: 23
End: 2018-07-15
Payer: MEDICAID

## 2018-07-15 ENCOUNTER — ANESTHESIA EVENT (OUTPATIENT)
Dept: OBSTETRICS AND GYNECOLOGY | Facility: OTHER | Age: 23
End: 2018-07-15
Payer: MEDICAID

## 2018-07-15 PROBLEM — O99.019 ANEMIA DURING PREGNANCY: Status: ACTIVE | Noted: 2018-07-15

## 2018-07-15 LAB
ABO + RH BLD: NORMAL
BASOPHILS # BLD AUTO: 0.02 K/UL
BASOPHILS NFR BLD: 0.2 %
BLD GP AB SCN CELLS X3 SERPL QL: NORMAL
DIFFERENTIAL METHOD: ABNORMAL
EOSINOPHIL # BLD AUTO: 0.1 K/UL
EOSINOPHIL NFR BLD: 0.5 %
ERYTHROCYTE [DISTWIDTH] IN BLOOD BY AUTOMATED COUNT: 13.2 %
HCT VFR BLD AUTO: 29 %
HGB BLD-MCNC: 9.1 G/DL
HIV1+2 IGG SERPL QL IA.RAPID: NEGATIVE
LYMPHOCYTES # BLD AUTO: 2.5 K/UL
LYMPHOCYTES NFR BLD: 26.5 %
MCH RBC QN AUTO: 24.4 PG
MCHC RBC AUTO-ENTMCNC: 31.4 G/DL
MCV RBC AUTO: 78 FL
MONOCYTES # BLD AUTO: 0.7 K/UL
MONOCYTES NFR BLD: 7.8 %
NEUTROPHILS # BLD AUTO: 6 K/UL
NEUTROPHILS NFR BLD: 64.6 %
PLATELET # BLD AUTO: 236 K/UL
PMV BLD AUTO: 10 FL
RBC # BLD AUTO: 3.73 M/UL
WBC # BLD AUTO: 9.28 K/UL

## 2018-07-15 PROCEDURE — 27200710 HC EPIDURAL INFUSION PUMP SET: Performed by: STUDENT IN AN ORGANIZED HEALTH CARE EDUCATION/TRAINING PROGRAM

## 2018-07-15 PROCEDURE — 27800517 HC TRAY,EPIDURAL-CONTINUOUS: Performed by: STUDENT IN AN ORGANIZED HEALTH CARE EDUCATION/TRAINING PROGRAM

## 2018-07-15 PROCEDURE — 99284 EMERGENCY DEPT VISIT MOD MDM: CPT | Mod: 25,,, | Performed by: OBSTETRICS & GYNECOLOGY

## 2018-07-15 PROCEDURE — 72100002 HC LABOR CARE, 1ST 8 HOURS

## 2018-07-15 PROCEDURE — 10907ZC DRAINAGE OF AMNIOTIC FLUID, THERAPEUTIC FROM PRODUCTS OF CONCEPTION, VIA NATURAL OR ARTIFICIAL OPENING: ICD-10-PCS | Performed by: OBSTETRICS & GYNECOLOGY

## 2018-07-15 PROCEDURE — 51702 INSERT TEMP BLADDER CATH: CPT

## 2018-07-15 PROCEDURE — 25000003 PHARM REV CODE 250: Performed by: STUDENT IN AN ORGANIZED HEALTH CARE EDUCATION/TRAINING PROGRAM

## 2018-07-15 PROCEDURE — 72100003 HC LABOR CARE, EA. ADDL. 8 HRS

## 2018-07-15 PROCEDURE — 86703 HIV-1/HIV-2 1 RESULT ANTBDY: CPT

## 2018-07-15 PROCEDURE — 86703 HIV-1/HIV-2 1 RESULT ANTBDY: CPT | Mod: 91

## 2018-07-15 PROCEDURE — 63600175 PHARM REV CODE 636 W HCPCS: Performed by: STUDENT IN AN ORGANIZED HEALTH CARE EDUCATION/TRAINING PROGRAM

## 2018-07-15 PROCEDURE — 36415 COLL VENOUS BLD VENIPUNCTURE: CPT

## 2018-07-15 PROCEDURE — 59409 OBSTETRICAL CARE: CPT | Mod: AA,,, | Performed by: ANESTHESIOLOGY

## 2018-07-15 PROCEDURE — 86901 BLOOD TYPING SEROLOGIC RH(D): CPT

## 2018-07-15 PROCEDURE — 62326 NJX INTERLAMINAR LMBR/SAC: CPT | Performed by: ANESTHESIOLOGY

## 2018-07-15 PROCEDURE — 85025 COMPLETE CBC W/AUTO DIFF WBC: CPT

## 2018-07-15 PROCEDURE — 11000001 HC ACUTE MED/SURG PRIVATE ROOM

## 2018-07-15 PROCEDURE — 59025 FETAL NON-STRESS TEST: CPT | Mod: 26,,, | Performed by: OBSTETRICS & GYNECOLOGY

## 2018-07-15 PROCEDURE — 86592 SYPHILIS TEST NON-TREP QUAL: CPT

## 2018-07-15 RX ORDER — FENTANYL/BUPIVACAINE/NS/PF 2MCG/ML-.1
PLASTIC BAG, INJECTION (ML) INJECTION CONTINUOUS
Status: DISCONTINUED | OUTPATIENT
Start: 2018-07-15 | End: 2018-07-16

## 2018-07-15 RX ORDER — SODIUM CHLORIDE, SODIUM LACTATE, POTASSIUM CHLORIDE, CALCIUM CHLORIDE 600; 310; 30; 20 MG/100ML; MG/100ML; MG/100ML; MG/100ML
INJECTION, SOLUTION INTRAVENOUS CONTINUOUS
Status: DISCONTINUED | OUTPATIENT
Start: 2018-07-15 | End: 2018-07-16

## 2018-07-15 RX ORDER — FENTANYL/BUPIVACAINE/NS/PF 2MCG/ML-.1
PLASTIC BAG, INJECTION (ML) INJECTION CONTINUOUS PRN
Status: DISCONTINUED | OUTPATIENT
Start: 2018-07-15 | End: 2018-07-16

## 2018-07-15 RX ORDER — FAMOTIDINE 10 MG/ML
20 INJECTION INTRAVENOUS ONCE
Status: DISCONTINUED | OUTPATIENT
Start: 2018-07-15 | End: 2018-07-16

## 2018-07-15 RX ORDER — OXYTOCIN/RINGER'S LACTATE 20/1000 ML
2 PLASTIC BAG, INJECTION (ML) INTRAVENOUS CONTINUOUS
Status: DISCONTINUED | OUTPATIENT
Start: 2018-07-15 | End: 2018-07-15

## 2018-07-15 RX ORDER — DIPHENHYDRAMINE HCL 25 MG
25 CAPSULE ORAL EVERY 6 HOURS PRN
Status: DISCONTINUED | OUTPATIENT
Start: 2018-07-15 | End: 2018-07-16 | Stop reason: SDUPTHER

## 2018-07-15 RX ORDER — OXYTOCIN/RINGER'S LACTATE 20/1000 ML
41.65 PLASTIC BAG, INJECTION (ML) INTRAVENOUS CONTINUOUS
Status: ACTIVE | OUTPATIENT
Start: 2018-07-15 | End: 2018-07-15

## 2018-07-15 RX ORDER — SODIUM CHLORIDE 9 MG/ML
INJECTION, SOLUTION INTRAVENOUS
Status: DISCONTINUED | OUTPATIENT
Start: 2018-07-15 | End: 2018-07-16

## 2018-07-15 RX ORDER — LIDOCAINE HYDROCHLORIDE AND EPINEPHRINE 15; 5 MG/ML; UG/ML
INJECTION, SOLUTION EPIDURAL
Status: DISCONTINUED | OUTPATIENT
Start: 2018-07-15 | End: 2018-07-16

## 2018-07-15 RX ORDER — BETAMETHASONE SODIUM PHOSPHATE AND BETAMETHASONE ACETATE 3; 3 MG/ML; MG/ML
12 INJECTION, SUSPENSION INTRA-ARTICULAR; INTRALESIONAL; INTRAMUSCULAR; SOFT TISSUE DAILY
Status: DISCONTINUED | OUTPATIENT
Start: 2018-07-15 | End: 2018-07-15 | Stop reason: SDUPTHER

## 2018-07-15 RX ORDER — OXYTOCIN/RINGER'S LACTATE 20/1000 ML
PLASTIC BAG, INJECTION (ML) INTRAVENOUS
Status: DISPENSED
Start: 2018-07-15 | End: 2018-07-15

## 2018-07-15 RX ORDER — OXYTOCIN/RINGER'S LACTATE 20/1000 ML
2 PLASTIC BAG, INJECTION (ML) INTRAVENOUS CONTINUOUS
Status: DISCONTINUED | OUTPATIENT
Start: 2018-07-15 | End: 2018-07-16

## 2018-07-15 RX ORDER — BETAMETHASONE SODIUM PHOSPHATE AND BETAMETHASONE ACETATE 3; 3 MG/ML; MG/ML
12 INJECTION, SUSPENSION INTRA-ARTICULAR; INTRALESIONAL; INTRAMUSCULAR; SOFT TISSUE DAILY
Status: COMPLETED | OUTPATIENT
Start: 2018-07-16 | End: 2018-07-16

## 2018-07-15 RX ORDER — CARBOPROST TROMETHAMINE 250 UG/ML
250 INJECTION, SOLUTION INTRAMUSCULAR
Status: DISCONTINUED | OUTPATIENT
Start: 2018-07-15 | End: 2018-07-16

## 2018-07-15 RX ORDER — ONDANSETRON 8 MG/1
8 TABLET, ORALLY DISINTEGRATING ORAL EVERY 8 HOURS PRN
Status: DISCONTINUED | OUTPATIENT
Start: 2018-07-15 | End: 2018-07-16 | Stop reason: SDUPTHER

## 2018-07-15 RX ORDER — MISOPROSTOL 200 UG/1
600 TABLET ORAL
Status: DISCONTINUED | OUTPATIENT
Start: 2018-07-15 | End: 2018-07-18 | Stop reason: HOSPADM

## 2018-07-15 RX ORDER — FENTANYL/BUPIVACAINE/NS/PF 2MCG/ML-.1
PLASTIC BAG, INJECTION (ML) INJECTION
Status: COMPLETED
Start: 2018-07-15 | End: 2018-07-16

## 2018-07-15 RX ORDER — METHYLERGONOVINE MALEATE 0.2 MG/ML
200 INJECTION INTRAVENOUS
Status: DISCONTINUED | OUTPATIENT
Start: 2018-07-15 | End: 2018-07-18 | Stop reason: HOSPADM

## 2018-07-15 RX ORDER — SODIUM CITRATE AND CITRIC ACID MONOHYDRATE 334; 500 MG/5ML; MG/5ML
30 SOLUTION ORAL ONCE
Status: DISCONTINUED | OUTPATIENT
Start: 2018-07-15 | End: 2018-07-16

## 2018-07-15 RX ADMIN — SODIUM CHLORIDE, SODIUM LACTATE, POTASSIUM CHLORIDE, AND CALCIUM CHLORIDE: .6; .31; .03; .02 INJECTION, SOLUTION INTRAVENOUS at 05:07

## 2018-07-15 RX ADMIN — SODIUM CHLORIDE, SODIUM LACTATE, POTASSIUM CHLORIDE, AND CALCIUM CHLORIDE: .6; .31; .03; .02 INJECTION, SOLUTION INTRAVENOUS at 01:07

## 2018-07-15 RX ADMIN — DEXTROSE 2.5 MILLION UNITS: 50 INJECTION, SOLUTION INTRAVENOUS at 02:07

## 2018-07-15 RX ADMIN — SODIUM CHLORIDE, SODIUM LACTATE, POTASSIUM CHLORIDE, AND CALCIUM CHLORIDE: .6; .31; .03; .02 INJECTION, SOLUTION INTRAVENOUS at 10:07

## 2018-07-15 RX ADMIN — Medication 10 ML/HR: at 03:07

## 2018-07-15 RX ADMIN — DIPHENHYDRAMINE HYDROCHLORIDE 25 MG: 25 CAPSULE ORAL at 05:07

## 2018-07-15 RX ADMIN — DEXTROSE 2.5 MILLION UNITS: 50 INJECTION, SOLUTION INTRAVENOUS at 10:07

## 2018-07-15 RX ADMIN — DEXTROSE 5 MILLION UNITS: 50 INJECTION, SOLUTION INTRAVENOUS at 01:07

## 2018-07-15 RX ADMIN — Medication 2 MILLI-UNITS/MIN: at 01:07

## 2018-07-15 RX ADMIN — LIDOCAINE HYDROCHLORIDE,EPINEPHRINE BITARTRATE 3 ML: 15; .005 INJECTION, SOLUTION EPIDURAL; INFILTRATION; INTRACAUDAL; PERINEURAL at 03:07

## 2018-07-15 RX ADMIN — Medication 30 MILLI-UNITS/MIN: at 09:07

## 2018-07-15 RX ADMIN — SODIUM CHLORIDE, SODIUM LACTATE, POTASSIUM CHLORIDE, AND CALCIUM CHLORIDE: .6; .31; .03; .02 INJECTION, SOLUTION INTRAVENOUS at 12:07

## 2018-07-15 RX ADMIN — DIPHENHYDRAMINE HYDROCHLORIDE 25 MG: 25 CAPSULE ORAL at 06:07

## 2018-07-15 RX ADMIN — DEXTROSE 2.5 MILLION UNITS: 50 INJECTION, SOLUTION INTRAVENOUS at 06:07

## 2018-07-15 RX ADMIN — BETAMETHASONE SODIUM PHOSPHATE AND BETAMETHASONE ACETATE 12 MG: 3; 3 INJECTION, SUSPENSION INTRA-ARTICULAR; INTRALESIONAL; INTRAMUSCULAR at 01:07

## 2018-07-15 NOTE — PROGRESS NOTES
"LABOR NOTE    S:  Complaints: No.  Epidural working:  yes      O: BP (!) 111/55   Pulse 62   Temp 97 °F (36.1 °C)   Resp 18   Ht 5' 3" (1.6 m)   Wt 66.6 kg (146 lb 13.2 oz)   LMP 2017   SpO2 96%   Breastfeeding? No   BMI 26.01 kg/m²       FHT: 120 Cat 1 (reassuring), moderate BTBV, no decels, +accels  CTX: q 3-4 minutes  SVE: 4/70/-3      ASSESSMENT:   22 y.o.  at 36w3d, in latent labor with reassuring fetal heart tracing    FHT reassuring    Active Hospital Problems    Diagnosis  POA    Rupture of membranes with clear amniotic fluid [O42.019]  Yes    Anemia during pregnancy [O99.019]  Unknown    Cystic fibrosis carrier [Z14.1]  Not Applicable    Rubella non-immune status, antepartum [O99.89, Z28.3]  Not Applicable      Resolved Hospital Problems    Diagnosis Date Resolved POA   No resolved problems to display.     Labor course  0230: 3  0430: 3, pitocin at 6 mU  0800: 4/70/-3, pit at 10U    PLAN:    Continue Close Maternal/Fetal Monitoring  Pitocin Augmentation per protocol  Recheck 2 hours or PRN    Qiana Ghosh MD   OBGYN, PGY-1      "

## 2018-07-15 NOTE — PROGRESS NOTES
"LABOR NOTE    Tosin Mckay is a 22 y.o. 22 y.o.  at 36w3d GA who presented to OB ED with rupture of membranes at 2230 on 18. Fluid was clear.   This IUP is complicated by anemia, rubella non-immune status, CF carrier    S: Complaints: none.     O: BP (!) 107/57   Pulse 85   Temp 97.5 °F (36.4 °C)   Resp 16   Ht 5' 3" (1.6 m)   Wt 66.6 kg (146 lb 13.2 oz)   LMP 2017   SpO2 99%   Breastfeeding? No   BMI 26.01 kg/m²     FHT: Baseline rate - 120 BPM. Categotry 1 tracing. Moderate BTBV. No decelerations.   CTX: irregular; adjust tocometer for better reading  SVE: 4/70/-3    A:   22 y.o.  at 36w3d, in latent labor with reassuring fetal heart tracing.     Active Hospital Problems    Diagnosis  POA    Rupture of membranes with clear amniotic fluid [O42.019]  Yes    Anemia during pregnancy [O99.019]  Unknown    Cystic fibrosis carrier [Z14.1]  Not Applicable    Rubella non-immune status, antepartum [O99.89, Z28.3]  Not Applicable      Resolved Hospital Problems    Diagnosis Date Resolved POA   No resolved problems to display.       P:    Continue  active labor management:  Pitocin augmentation per protocol  Continue close maternal and fetal monitoring  Continue PCN for GBS unknown status  Recheck 2 hours or sooner if needed.    TIMELINE:  Admitted at 3/70/-3  0230: 4/70/-3  0430: 4/70/-3, pitocin at 6 mU    Sinai Young MD  OBGYN, PGY-1      "

## 2018-07-15 NOTE — ED PROVIDER NOTES
Encounter Date: 2018       History     Chief Complaint   Patient presents with    Rupture of Membranes     Tosin Mckay is a 22 y.o. F at 36w3d presents complaining of rupture of membranes at 2230 this evening. She reports gush of fluid soaking through her clothing. She has continued to leak fluid steadily.    This IUP is complicated by anemia and h/o postpartum hemorrhage requiring blood transfusion. Patient denies any other significant past medical history. Patient denies contractions, denies vaginal bleeding, reports LOF.   Fetal Movement: normal.          Review of patient's allergies indicates:  No Known Allergies  Past Medical History:   Diagnosis Date    Rubella non-immune status, antepartum      No past surgical history on file.  Family History   Problem Relation Age of Onset    Hypertension Father     Breast cancer Neg Hx     Colon cancer Neg Hx     Diabetes Neg Hx     Eclampsia Neg Hx     Stroke Neg Hx     Miscarriages / Stillbirths Neg Hx     Ovarian cancer Neg Hx      Social History   Substance Use Topics    Smoking status: Never Smoker    Smokeless tobacco: Never Used    Alcohol use No     Review of Systems   Constitutional: Negative for chills, diaphoresis, fatigue and fever.   HENT: Negative for sore throat.    Eyes: Negative for photophobia and visual disturbance.   Respiratory: Negative for cough, chest tightness, shortness of breath and wheezing.    Cardiovascular: Negative for chest pain and palpitations.   Gastrointestinal: Negative for abdominal pain, constipation, diarrhea, nausea and vomiting.   Genitourinary: Negative for difficulty urinating, dysuria, hematuria and vaginal bleeding.   Musculoskeletal: Negative for back pain.   Skin: Negative for rash.   Neurological: Negative for syncope, weakness, light-headedness and headaches.   Hematological: Does not bruise/bleed easily.       Physical Exam     Initial Vitals   BP Pulse Resp Temp SpO2   18 2324 18  2324 07/14/18 2324 07/14/18 2324 07/14/18 2325   133/78 (!) 111 16 97.5 °F (36.4 °C) 98 %      MAP       --                Physical Exam    Vitals reviewed.  Constitutional: She appears well-developed and well-nourished. She is not diaphoretic. No distress.   HENT:   Head: Normocephalic and atraumatic.   Nose: Nose normal.   Eyes: Conjunctivae are normal. Right eye exhibits no discharge. Left eye exhibits no discharge. No scleral icterus.   Neck: Normal range of motion.   Cardiovascular: Normal rate, regular rhythm, normal heart sounds and intact distal pulses.   Pulmonary/Chest: Breath sounds normal. No respiratory distress. She has no wheezes.   Abdominal: Soft. There is no tenderness. There is no rebound and no guarding.   gravid   Genitourinary: Vagina normal.   Genitourinary Comments: Discharge seen on vaginal exam. No pooling of fluid noted in the vaginal vault. No gush of fluid on Valsalva   Musculoskeletal: Normal range of motion. She exhibits no edema.   Neurological: She is alert and oriented to person, place, and time.   Skin: Skin is warm and dry. No rash noted. No erythema.   Psychiatric: She has a normal mood and affect. Her behavior is normal. Judgment and thought content normal.     OB LABOR EXAM:   Pre-Term Labor: Yes.     Method: Sterile vaginal exam per MD and Sterile speculum exam per MD.   Vaginal Bleeding: none present.     Dilatation: 3.   Station: -3.   Effacement: 70%.   Amniotic Fluid Color: clear.           ED Course   Procedures  Labs Reviewed - No data to display       Imaging Results    None          Medical Decision Making:   ED Management:  Several minutes after initial exam, nurse went to move pt and she had another large gush of fluid.  Nitrazine test positive. Patient determined to have rupture of membranes.    Vital signs stable  Admit to labor and delivery for anticipated vaginal delivery              Attending Attestation:   Physician Attestation Statement for Resident:  As the  supervising MD   Physician Attestation Statement: I have personally seen and examined this patient.   I agree with the above history. -:   As the supervising MD I agree with the above PE.    As the supervising MD I agree with the above treatment, course, plan, and disposition.  I was personally present during the critical portions of the procedure(s) performed by the resident and was immediately available in the ED to provide services and assistance as needed during the entire procedure.  I have reviewed and agree with the residents interpretation of the following: rhythm strips.  I have reviewed the following: old records at this facility.                     ED Course as of Jul 15 0637   Sun Jul 15, 2018   0008 I evaluated Ms. Mckay with Dr. Young.  Pt presents at 36w3d with gush of fluid.  On exam, she was found to be grossly ruptured and nitrazine +.  Will admit to labor and delivery  [HU]      ED Course User Index  [HU] Kamille Lam DO     Clinical Impression:   The primary encounter diagnosis was  premature rupture of membranes (PPROM) with onset of labor within 24 hours of rupture in third trimester, antepartum. A diagnosis of 36 weeks gestation of pregnancy was also pertinent to this visit.              Sinai Young MD  Resident  07/15/18 0105       Kamille Lam DO  07/15/18 0640

## 2018-07-15 NOTE — PROGRESS NOTES
"LABOR NOTE    Tosin Mckay is a 22 y.o. 22 y.o.  at 36w3d GA who presented to OB ED with rupture of membranes at 2230 on 18. Fluid was clear.   This IUP is complicated by anemia, rubella non-immune status, CF carrier    S: Complaints: none.     O: BP (!) 107/57   Pulse 85   Temp 97.5 °F (36.4 °C)   Resp 16   Ht 5' 3" (1.6 m)   Wt 66.6 kg (146 lb 13.2 oz)   LMP 2017   SpO2 99%   Breastfeeding? No   BMI 26.01 kg/m²     FHT: Baseline rate - 125 BPM. Categotry 1 tracing. Moderate BTBV. No decelerations.   CTX: Every 3-4 minutes  SVE: 4/70/-3, membranes SROM clear @ 2230 on .    A:   22 y.o.  at 36w3d, in latent labor with reassuring fetal heart tracing.     Active Hospital Problems    Diagnosis  POA    Rupture of membranes with clear amniotic fluid [O42.019]  Yes    Anemia during pregnancy [O99.019]  Unknown    Cystic fibrosis carrier [Z14.1]  Not Applicable    Rubella non-immune status, antepartum [O99.89, Z28.3]  Not Applicable      Resolved Hospital Problems    Diagnosis Date Resolved POA   No resolved problems to display.       P:    Continue  active labor management:  Pitocin augmentation per protocol  Continue close maternal and fetal monitoring  Continue PCN for GBS unknown status  Recheck 2 hours or sooner if needed.    TIMELINE:  Admitted at 3/70/-3  0230: 4/70/-3    Librado Templeton M.D.  Obstetrics & Gynecology  PGY-2      "

## 2018-07-15 NOTE — H&P
HISTORY AND PHYSICAL                                                OBSTETRICS       Subjective:       Tosin Mckay is a 22 y.o.  female with IUP at 36w3d weeks gestation who presented to the OB ED with CC of rupture of membranes at 2230 on 18. Fluid was clear.   This IUP is complicated by anemia, rubella non-immune status, CF carrier (partner not tested, as he is incarcerated).  Patient denies contractions, denies vaginal bleeding, denies LOF.   Fetal Movement: normal.     PMHx:   Past Medical History:   Diagnosis Date    Rubella non-immune status, antepartum        PSHx: No past surgical history on file.    All: Review of patient's allergies indicates:  No Known Allergies    Meds:   Prescriptions Prior to Admission   Medication Sig Dispense Refill Last Dose    doxylamine succinate (UNISOM, DOXYLAMINE,) 25 mg tablet Take 1 tablet (25 mg total) by mouth 3 (three) times daily.  0 2018 at Unknown time    ferrous sulfate 325 mg (65 mg iron) Tab tablet Take 1 tablet (325 mg total) by mouth once daily. 30 tablet 3 2018 at Unknown time    prenatal vits96-iron fum-folic 27 mg iron- 800 mcg Tab tablet Take 1 tablet by mouth once daily. 30 tablet 11 2018 at Unknown time    ondansetron (ZOFRAN-ODT) 4 MG TbDL Take 1 tablet (4 mg total) by mouth every 6 (six) hours as needed. 30 tablet 1 Unknown at Unknown time       SH:   Social History     Social History    Marital status: Single     Spouse name: N/A    Number of children: N/A    Years of education: N/A     Occupational History    Not on file.     Social History Main Topics    Smoking status: Never Smoker    Smokeless tobacco: Never Used    Alcohol use No    Drug use: No    Sexual activity: No     Other Topics Concern    Not on file     Social History Narrative    No narrative on file       FH:   Family History   Problem Relation Age of Onset    Hypertension Father     Breast cancer Neg Hx     Colon cancer Neg Hx     Diabetes  "Neg Hx     Eclampsia Neg Hx     Stroke Neg Hx     Miscarriages / Stillbirths Neg Hx     Ovarian cancer Neg Hx        OBHx:   Obstetric History       T0      L2     SAB0   TAB0   Ectopic0   Multiple0   Live Births2       # Outcome Date GA Lbr Colby/2nd Weight Sex Delivery Anes PTL Lv   3 Current            2 Para 17    F Vag-Spont   JIM   1 Para 14    M Vag-Spont   JIM        Review of Systems   Constitutional: Negative for chills, diaphoresis, fatigue and fever.   HENT: Negative for sore throat.    Eyes: Negative for photophobia and visual disturbance.   Respiratory: Negative for cough, chest tightness, shortness of breath and wheezing.    Cardiovascular: Negative for chest pain and palpitations.   Gastrointestinal: Negative for abdominal pain, constipation, diarrhea, nausea and vomiting.   Genitourinary: Negative for difficulty urinating, dysuria, hematuria and vaginal bleeding.   Musculoskeletal: Negative for back pain.   Skin: Negative for rash.   Neurological: Negative for syncope, weakness, light-headedness and headaches.   Hematological: Does not bruise/bleed easily.     Objective:       /78   Pulse (!) 118   Temp 97.5 °F (36.4 °C)   Resp 16   Ht 5' 3" (1.6 m)   Wt 66.6 kg (146 lb 13.2 oz)   LMP 2017   SpO2 98%   Breastfeeding? No   BMI 26.01 kg/m²     Vitals:    18 2324 18 2325 07/15/18 0000   BP: 133/78     Pulse: (!) 111 (!) 118    Resp: 16     Temp: 97.5 °F (36.4 °C)     SpO2:  98%    Weight:   66.6 kg (146 lb 13.2 oz)   Height:   5' 3" (1.6 m)       General:   alert, appears stated age and cooperative   Lungs:   clear to auscultation bilaterally   Heart:   regular rate and rhythm, S1, S2 normal, no murmur, click, rub or gallop   Abdomen:  soft, non-tender; bowel sounds normal; no masses,  no organomegaly   Extremities negative edema, negative erythema   FHT: Baseline 130 Cat 1 (reassuring)               TOCO: None   Presentation: cephalic by " ultrasound   Cervix:     Dilation: 3cm    Effacement: 70%    Station:  -3    Consistency: medium    Position: middle     EFW by Leopold's: [id number]    Lab Review  Blood Type O POS  GBBS: 3T unknown, 1Tnegative  Rubella: Not immune  RPR: 1T nonreactive  HIV: 1T negative  HepB: negative       Assessment:     Tosin Mckay is a 22 y.o.  at 36w3d weeks gestation admitted for PPROM.     Active Hospital Problems    Diagnosis  POA    Rupture of membranes with clear amniotic fluid [O42.019]  Yes    Anemia during pregnancy [O99.019]  Unknown    Cystic fibrosis carrier [Z14.1]  Not Applicable    Rubella non-immune status, antepartum [O99.89, Z28.3]  Not Applicable      Resolved Hospital Problems    Diagnosis Date Resolved POA   No resolved problems to display.      Plan:     1. PPROM  - Patient consented for vaginal delivery and CS if indicated, as well as blood product  - Consents signed and to chart  - Admit to Labor and Delivery unit  - BMZ course  - PCN for  & GBS unknown   - Active management - pitocin augmentation  - Epidural per Anesthesia  - Draw CBC, T&S, and third trimester labs  - Notify Staff  - Recheck in 2 hrs or PRN    2. Chronic Anemia  - H/H on 18  - Has been taking iron - restart PP    3. Rubella non-immune  - PP MMR    4. Cystic fibrosis carrier - FoB not tested (see below)    5. Consider SW consult due to FoB currently incarcerated    Post-Partum Hemorrhage risk - low    Librado Templeton M.D.  Obstetrics & Gynecology  PGY-2

## 2018-07-15 NOTE — PROGRESS NOTES
"LABOR NOTE    S:  Complaints: No.  Epidural working:  yes    Placed IUPC    O: BP (!) 111/55   Pulse 62   Temp 97 °F (36.1 °C)   Resp 18   Ht 5' 3" (1.6 m)   Wt 66.6 kg (146 lb 13.2 oz)   LMP 2017   SpO2 96%   Breastfeeding? No   BMI 26.01 kg/m²       FHT: 115 Cat 1 (reassuring), moderate BTBV, +accels, no decels  CTX: q 2-3 minutes  SVE: 4/70/-3      ASSESSMENT:   22 y.o.  at 36w3d, in latent labor with reassuring fetal heart tracing    FHT reassuring    Active Hospital Problems    Diagnosis  POA    Rupture of membranes with clear amniotic fluid [O42.019]  Yes    Anemia during pregnancy [O99.019]  Unknown    Cystic fibrosis carrier [Z14.1]  Not Applicable    Rubella non-immune status, antepartum [O99.89, Z28.3]  Not Applicable      Resolved Hospital Problems    Diagnosis Date Resolved POA   No resolved problems to display.     Labor course  0230: -3  0430: 4/70/-3, pitocin at 6 mU  0800: 4/70/-3, pit at 10U  1230: 4/70/-3, placed IUPC, pit at 20U    PLAN:    Continue Close Maternal/Fetal Monitoring  Pitocin Augmentation per protocol  Recheck 2 hours or PRN    Qiana Ghosh MD   OBGYN, PGY-1      "

## 2018-07-15 NOTE — ANESTHESIA PROCEDURE NOTES
Epidural    Patient location during procedure: OB   Reason for block: primary anesthetic   Diagnosis: IUP   Start time: 7/15/2018 3:35 AM  Timeout: 7/15/2018 3:32 AM  End time: 7/15/2018 3:40 AM  Surgery related to: Vaginal Delivery  Staffing  Anesthesiologist: EDUARDO MAGALLANES  Resident/CRNA: CHANDANA MATA  Performed: resident/CRNA   Preanesthetic Checklist  Completed: patient identified, site marked, surgical consent, pre-op evaluation, timeout performed, IV checked, risks and benefits discussed, monitors and equipment checked, anesthesia consent given, hand hygiene performed and patient being monitored  Preparation  Patient position: sitting  Prep: ChloraPrep  Patient monitoring: Pulse Ox  Epidural  Skin Anesthetic: lidocaine 1%  Skin Wheal: 3 mL  Administration type: continuous  Approach: midline  Interspace: L3-4  Injection technique: CJ saline  Needle and Epidural Catheter  Needle type: Tuohy   Needle gauge: 17  Needle length: 3.5 inches  Needle insertion depth: 5 cm  Catheter type: springwound  Catheter size: 19 G  Catheter at skin depth: 10 cm  Test dose: 3 mL of lidocaine 1.5% with Epi 1-to-200,000  Additional Documentation: incremental injection, negative aspiration for heme and CSF, no paresthesia on injection, no signs/symptoms of IV or SA injection, no significant pain on injection and no significant complaints from patient  Needle localization: anatomical landmarks  Medications:  Bolus administered: 10 mL of 0.125% bupivacaine  Volume per aspiration: 5 mL  Time between aspirations: 5 minutes  Assessment  Ease of block: easy  Patient's tolerance of the procedure: comfortable throughout block and no complaints  Post dural Puncture Headache?: No

## 2018-07-15 NOTE — ANESTHESIA PREPROCEDURE EVALUATION
07/15/2018  Tosin Mckay is a 22 y.o., female.    Tosin Mckay is a 22 y.o. female  @ 36w3d here for IOL 2/2 ROM. Prev deliveries .    OB History    Para Term  AB Living   3 2       2   SAB TAB Ectopic Multiple Live Births           2      # Outcome Date GA Lbr Colby/2nd Weight Sex Delivery Anes PTL Lv   3 Current            2 Para 17    F Vag-Spont   JIM   1 Para 14    M Vag-Spont   JIM          Wt Readings from Last 1 Encounters:   18 1459 66.7 kg (147 lb)       BP Readings from Last 3 Encounters:   18 133/78   18 114/64   18 116/60       Patient Active Problem List   Diagnosis    Rubella non-immune status, antepartum    Cystic fibrosis carrier    Encounter for supervision of other normal pregnancy, third trimester    Rupture of membranes with clear amniotic fluid    Anemia during pregnancy       No past surgical history on file.    Social History     Social History    Marital status: Single     Spouse name: N/A    Number of children: N/A    Years of education: N/A     Occupational History    Not on file.     Social History Main Topics    Smoking status: Never Smoker    Smokeless tobacco: Never Used    Alcohol use No    Drug use: No    Sexual activity: No     Other Topics Concern    Not on file     Social History Narrative    No narrative on file         Chemistry        Component Value Date/Time     (L) 2018 1225    K 4.1 2018 1225     2018 1225    CO2 20 (L) 2018 1225    BUN 8 2018 1225    CREATININE 0.6 2018 1225    GLU 76 2018 1225        Component Value Date/Time    CALCIUM 8.4 (L) 2018 1225    ALKPHOS 65 2018 1225    AST 14 2018 1225    ALT 8 (L) 2018 1225    BILITOT 0.3 2018 1225    ESTGFRAFRICA >60 2018 1225    EGFRNONAA >60  05/08/2018 1225            Lab Results   Component Value Date    WBC 8.52 05/08/2018    HGB 9.6 (L) 05/08/2018    HCT 28.9 (L) 05/08/2018    MCV 85 05/08/2018     05/08/2018       No results for input(s): PT, INR, PROTIME, APTT in the last 72 hours.      Anesthesia Evaluation    I have reviewed the Patient Summary Reports.    I have reviewed the Nursing Notes.   I have reviewed the Medications.     Review of Systems  Anesthesia Hx:  No problems with previous Anesthesia  History of prior surgery of interest to airway management or planning: Previous anesthesia: Epidural Denies Family Hx of Anesthesia complications.   Denies Personal Hx of Anesthesia complications.   Social:  Non-Smoker, No Alcohol Use    Hematology/Oncology:     Oncology Normal    -- Anemia:   EENT/Dental:EENT/Dental Normal   Cardiovascular:  Cardiovascular Normal Exercise tolerance: good     Pulmonary:  Pulmonary Normal    Renal/:  Renal/ Normal     Hepatic/GI:  Hepatic/GI Normal    Musculoskeletal:  Musculoskeletal Normal    Neurological:  Neurology Normal    Endocrine:  Endocrine Normal    Dermatological:  Skin Normal    Psych:  Psychiatric Normal           Physical Exam  General:  Well nourished    Airway/Jaw/Neck:  Airway Findings: Mouth Opening: Normal Tongue: Normal  General Airway Assessment: Adult  Mallampati: II  Improves to I with phonation.  TM Distance: Normal, at least 6 cm  Jaw/Neck Findings:  Neck ROM: Normal ROM      Dental:  Dental Findings: In tact   Chest/Lungs:  Chest/Lungs Findings: Clear to auscultation, Normal Respiratory Rate     Heart/Vascular:  Heart Findings: Rate: Normal  Rhythm: Regular Rhythm  Sounds: Normal  Heart murmur: negative Vascular Findings: Normal    Abdomen:  Abdomen Findings: Normal    Musculoskeletal:  Musculoskeletal Findings: Normal   Skin:  Skin Findings: Normal    Mental Status:  Mental Status Findings: Normal        Anesthesia Plan  Type of Anesthesia, risks & benefits discussed:  Anesthesia  Type:  epidural, general, CSE, spinal  Patient's Preference:   Intra-op Monitoring Plan: standard ASA monitors  Intra-op Monitoring Plan Comments:   Post Op Pain Control Plan: multimodal analgesia  Post Op Pain Control Plan Comments:   Induction:   IV and rapid sequence  Beta Blocker:  Patient is not currently on a Beta-Blocker (No further documentation required).       Informed Consent: Patient understands risks and agrees with Anesthesia plan.  Questions answered. Anesthesia consent signed with patient.  ASA Score: 2     Day of Surgery Review of History & Physical:    H&P update referred to the surgeon.         Ready For Surgery From Anesthesia Perspective.

## 2018-07-15 NOTE — PROGRESS NOTES
"LABOR NOTE    S:  Complaints: No.  Epidural working:  yes      O: /62   Pulse 67   Temp 97.5 °F (36.4 °C) (Temporal)   Resp 18   Ht 5' 3" (1.6 m)   Wt 66.6 kg (146 lb 13.2 oz)   LMP 2017   SpO2 100%   Breastfeeding? No   BMI 26.01 kg/m²       FHT: 115 Cat 1 (reassuring), moderate BTBV, +accels  CTX: q 2 minutes  SVE: /-3      ASSESSMENT:   22 y.o.  at 36w3d, in latent labor with reassuring fetal heart tracing    FHT reassuring    Active Hospital Problems    Diagnosis  POA    Rupture of membranes with clear amniotic fluid [O42.019]  Yes    Anemia during pregnancy [O99.019]  Unknown    Cystic fibrosis carrier [Z14.1]  Not Applicable    Rubella non-immune status, antepartum [O99.89, Z28.3]  Not Applicable      Resolved Hospital Problems    Diagnosis Date Resolved POA   No resolved problems to display.     Labor course  0230: /-3  0430: /-3, pitocin at 6 mU  0800: -3, pit at 10U  1230: /-3, placed IUPC, pit at 20U  1430: -3, pit at 30U    PLAN:    Continue Close Maternal/Fetal Monitoring  Pitocin Augmentation per protocol  Recheck 2 hours or PRN    Qiana Ghosh MD   OBGYN, PGY-1      "

## 2018-07-15 NOTE — PROGRESS NOTES
"LABOR NOTE    S:  Complaints: No.  Epidural working:  yes      O: /62   Pulse 67   Temp 97.5 °F (36.4 °C) (Temporal)   Resp 18   Ht 5' 3" (1.6 m)   Wt 66.6 kg (146 lb 13.2 oz)   LMP 2017   SpO2 100%   Breastfeeding? No   BMI 26.01 kg/m²       FHT: 115 Cat 1 (reassuring), moderate BTBV  CTX: q 2 minutes  SVE: /-3      ASSESSMENT:   22 y.o.  at 36w3d, in latent labor with reassuring fetal heart tracing    FHT reassuring    Active Hospital Problems    Diagnosis  POA    Rupture of membranes with clear amniotic fluid [O42.019]  Yes    Anemia during pregnancy [O99.019]  Unknown    Cystic fibrosis carrier [Z14.1]  Not Applicable    Rubella non-immune status, antepartum [O99.89, Z28.3]  Not Applicable      Resolved Hospital Problems    Diagnosis Date Resolved POA   No resolved problems to display.     Labor course  0230: /-3  0430: /-3, pitocin at 6 mU  0800: /-3, pit at 10U  1230: /-3, placed IUPC, pit at 20U  1430: /-3, pit at 30U  1630: /-3, pit rest    PLAN:    Continue Close Maternal/Fetal Monitoring  Pitocin Augmentation per protocol  Recheck 2 hours or PRN    Qiana Ghosh MD   OBPRISCILA, PGY-1      "

## 2018-07-16 LAB
HIV 1+2 AB+HIV1 P24 AG SERPL QL IA: NEGATIVE
RPR SER QL: NORMAL

## 2018-07-16 PROCEDURE — 72100003 HC LABOR CARE, EA. ADDL. 8 HRS

## 2018-07-16 PROCEDURE — 63600175 PHARM REV CODE 636 W HCPCS: Performed by: STUDENT IN AN ORGANIZED HEALTH CARE EDUCATION/TRAINING PROGRAM

## 2018-07-16 PROCEDURE — 93010 ELECTROCARDIOGRAM REPORT: CPT | Mod: ,,, | Performed by: INTERNAL MEDICINE

## 2018-07-16 PROCEDURE — 59409 OBSTETRICAL CARE: CPT | Mod: AT,,, | Performed by: OBSTETRICS & GYNECOLOGY

## 2018-07-16 PROCEDURE — 93005 ELECTROCARDIOGRAM TRACING: CPT

## 2018-07-16 PROCEDURE — 25000003 PHARM REV CODE 250: Performed by: STUDENT IN AN ORGANIZED HEALTH CARE EDUCATION/TRAINING PROGRAM

## 2018-07-16 PROCEDURE — 25000003 PHARM REV CODE 250

## 2018-07-16 PROCEDURE — 99900035 HC TECH TIME PER 15 MIN (STAT)

## 2018-07-16 PROCEDURE — 72200005 HC VAGINAL DELIVERY LEVEL II

## 2018-07-16 PROCEDURE — 11000001 HC ACUTE MED/SURG PRIVATE ROOM

## 2018-07-16 RX ORDER — HYDROCODONE BITARTRATE AND ACETAMINOPHEN 10; 325 MG/1; MG/1
1 TABLET ORAL EVERY 6 HOURS PRN
Status: DISCONTINUED | OUTPATIENT
Start: 2018-07-16 | End: 2018-07-18 | Stop reason: HOSPADM

## 2018-07-16 RX ORDER — IRON POLYSACCHARIDE COMPLEX 150 MG
150 CAPSULE ORAL DAILY
Status: DISCONTINUED | OUTPATIENT
Start: 2018-07-16 | End: 2018-07-18 | Stop reason: HOSPADM

## 2018-07-16 RX ORDER — ACETAMINOPHEN 325 MG/1
650 TABLET ORAL EVERY 6 HOURS PRN
Status: DISCONTINUED | OUTPATIENT
Start: 2018-07-16 | End: 2018-07-18 | Stop reason: HOSPADM

## 2018-07-16 RX ORDER — MISOPROSTOL 200 UG/1
200 TABLET ORAL EVERY 6 HOURS
Status: DISCONTINUED | OUTPATIENT
Start: 2018-07-16 | End: 2018-07-16

## 2018-07-16 RX ORDER — DOCUSATE SODIUM 100 MG/1
100 CAPSULE, LIQUID FILLED ORAL DAILY
Status: DISCONTINUED | OUTPATIENT
Start: 2018-07-16 | End: 2018-07-18 | Stop reason: HOSPADM

## 2018-07-16 RX ORDER — DIPHENHYDRAMINE HYDROCHLORIDE 50 MG/ML
25 INJECTION INTRAMUSCULAR; INTRAVENOUS EVERY 4 HOURS PRN
Status: DISCONTINUED | OUTPATIENT
Start: 2018-07-16 | End: 2018-07-18 | Stop reason: HOSPADM

## 2018-07-16 RX ORDER — DIPHENHYDRAMINE HCL 25 MG
25 CAPSULE ORAL EVERY 4 HOURS PRN
Status: DISCONTINUED | OUTPATIENT
Start: 2018-07-16 | End: 2018-07-18 | Stop reason: HOSPADM

## 2018-07-16 RX ORDER — DOCUSATE SODIUM 100 MG/1
200 CAPSULE, LIQUID FILLED ORAL 2 TIMES DAILY PRN
Status: DISCONTINUED | OUTPATIENT
Start: 2018-07-16 | End: 2018-07-18 | Stop reason: HOSPADM

## 2018-07-16 RX ORDER — ONDANSETRON 8 MG/1
8 TABLET, ORALLY DISINTEGRATING ORAL EVERY 8 HOURS PRN
Status: DISCONTINUED | OUTPATIENT
Start: 2018-07-16 | End: 2018-07-18 | Stop reason: HOSPADM

## 2018-07-16 RX ORDER — OXYTOCIN/RINGER'S LACTATE 20/1000 ML
41.65 PLASTIC BAG, INJECTION (ML) INTRAVENOUS CONTINUOUS
Status: ACTIVE | OUTPATIENT
Start: 2018-07-16 | End: 2018-07-16

## 2018-07-16 RX ORDER — HYDROCODONE BITARTRATE AND ACETAMINOPHEN 5; 325 MG/1; MG/1
1 TABLET ORAL EVERY 6 HOURS PRN
Status: DISCONTINUED | OUTPATIENT
Start: 2018-07-16 | End: 2018-07-18 | Stop reason: HOSPADM

## 2018-07-16 RX ORDER — IBUPROFEN 600 MG/1
600 TABLET ORAL EVERY 6 HOURS PRN
Status: DISCONTINUED | OUTPATIENT
Start: 2018-07-16 | End: 2018-07-18 | Stop reason: HOSPADM

## 2018-07-16 RX ORDER — IBUPROFEN 600 MG/1
600 TABLET ORAL EVERY 6 HOURS PRN
Qty: 30 TABLET | Refills: 2 | Status: SHIPPED | OUTPATIENT
Start: 2018-07-16 | End: 2018-08-20 | Stop reason: SDUPTHER

## 2018-07-16 RX ORDER — MISOPROSTOL 100 UG/1
100 TABLET ORAL EVERY 6 HOURS
Status: DISCONTINUED | OUTPATIENT
Start: 2018-07-16 | End: 2018-07-16

## 2018-07-16 RX ORDER — DIPHENHYDRAMINE HCL 25 MG
25 CAPSULE ORAL EVERY 6 HOURS PRN
Status: DISCONTINUED | OUTPATIENT
Start: 2018-07-16 | End: 2018-07-16 | Stop reason: SDUPTHER

## 2018-07-16 RX ADMIN — Medication 250 ML: at 01:07

## 2018-07-16 RX ADMIN — IBUPROFEN 600 MG: 600 TABLET ORAL at 04:07

## 2018-07-16 RX ADMIN — DOCUSATE SODIUM 100 MG: 100 CAPSULE, LIQUID FILLED ORAL at 08:07

## 2018-07-16 RX ADMIN — DIPHENHYDRAMINE HYDROCHLORIDE 25 MG: 25 CAPSULE ORAL at 04:07

## 2018-07-16 RX ADMIN — IBUPROFEN 600 MG: 600 TABLET ORAL at 08:07

## 2018-07-16 RX ADMIN — Medication 150 MG: at 08:07

## 2018-07-16 RX ADMIN — BETAMETHASONE SODIUM PHOSPHATE AND BETAMETHASONE ACETATE 12 MG: 3; 3 INJECTION, SUSPENSION INTRA-ARTICULAR; INTRALESIONAL; INTRAMUSCULAR at 02:07

## 2018-07-16 RX ADMIN — HYDROCODONE BITARTRATE AND ACETAMINOPHEN 1 TABLET: 10; 325 TABLET ORAL at 08:07

## 2018-07-16 RX ADMIN — HYDROCODONE BITARTRATE AND ACETAMINOPHEN 1 TABLET: 10; 325 TABLET ORAL at 06:07

## 2018-07-16 RX ADMIN — MISOPROSTOL 800 MCG: 200 TABLET ORAL at 04:07

## 2018-07-16 RX ADMIN — Medication 333 MILLI-UNITS/MIN: at 04:07

## 2018-07-16 RX ADMIN — METHYLERGONOVINE MALEATE 200 MCG: 0.2 INJECTION, SOLUTION INTRAMUSCULAR; INTRAVENOUS at 04:07

## 2018-07-16 NOTE — PROGRESS NOTES
"LABOR NOTE    S:  Complaints: No.  Epidural working:  yes      O: /61   Pulse 70   Temp 97.3 °F (36.3 °C) (Temporal)   Resp 18   Ht 5' 3" (1.6 m)   Wt 66.6 kg (146 lb 13.2 oz)   LMP 2017   SpO2 96%   Breastfeeding? No   BMI 26.01 kg/m²       FHT: 115, + accels, no decels, Cat 1 (reassuring), moderate BTBV  CTX: q 2-4 minutes, MUS approx 100 on 30U pitocin  SVE: /-2  AROM of forebag    ASSESSMENT:   22 y.o.  at 36w3d, in latent labor with reassuring fetal heart tracing    FHT reassuring    Active Hospital Problems    Diagnosis  POA    Rupture of membranes with clear amniotic fluid [O42.019]  Yes    Anemia during pregnancy [O99.019]  Unknown    Cystic fibrosis carrier [Z14.1]  Not Applicable    Rubella non-immune status, antepartum [O99.89, Z28.3]  Not Applicable      Resolved Hospital Problems    Diagnosis Date Resolved POA   No resolved problems to display.     Labor course  0230: /-3  0430: /-3, pitocin at 6 mU  0800: -3, pit at 10U  1230: /-3, placed IUPC, pit at 20U  1430: /-3, pit at 30U  1630: /-3, pit rest  1930: -3, pit restarted @ 27U  2330: -2, rupture of fore bag, pit at 30U  0130: -2, pit at 30U    PLAN:    Continue Close Maternal/Fetal Monitoring  Pitocin Augmentation per protocol  Recheck 2 hours or PRN    Amalia Duque MD  OB/GYN  PGY-1        "

## 2018-07-16 NOTE — PLAN OF CARE
Problem: Patient Care Overview  Goal: Plan of Care Review  Outcome: Ongoing (interventions implemented as appropriate)  Plan: pt to try to pump both breast at least 8 times daily at highest comfortable level. If unable to pump on left, pt to try hand expression. Pt to use lanolin prn.

## 2018-07-16 NOTE — PROGRESS NOTES
"LABOR NOTE    S:  Complaints: No.  Epidural working:  yes      O: /74   Pulse 72   Temp 97.3 °F (36.3 °C) (Temporal)   Resp 18   Ht 5' 3" (1.6 m)   Wt 66.6 kg (146 lb 13.2 oz)   LMP 2017   SpO2 99%   Breastfeeding? No   BMI 26.01 kg/m²       FHT: 130, + accels, no decels, Cat 1 (reassuring), moderate BTBV  CTX: q 2-4 minutes, MUS approx 100 on 30U pitocin  SVE: /-2  AROM of forebag    ASSESSMENT:   22 y.o.  at 36w3d, in latent labor with reassuring fetal heart tracing    FHT reassuring    Active Hospital Problems    Diagnosis  POA    Rupture of membranes with clear amniotic fluid [O42.019]  Yes    Anemia during pregnancy [O99.019]  Unknown    Cystic fibrosis carrier [Z14.1]  Not Applicable    Rubella non-immune status, antepartum [O99.89, Z28.3]  Not Applicable      Resolved Hospital Problems    Diagnosis Date Resolved POA   No resolved problems to display.     Labor course  0230: /-3  0430: /-3, pitocin at 6 mU  0800: /-3, pit at 10U  1230: /-3, placed IUPC, pit at 20U  1430: /-3, pit at 30U  1630: /-3, pit rest  1930: /-3, pit restarted @ 27U  2330: /-2, rupture of fore bag, pit @ 30    PLAN:    Continue Close Maternal/Fetal Monitoring  Pitocin Augmentation per protocol  Recheck 2 hours or PRN    Amalia Duque MD  OB/GYN  PGY-1        "

## 2018-07-16 NOTE — L&D DELIVERY NOTE
[facility]  Vaginal Delivery   Operative Note    SUMMARY     Normal spontaneous vaginal delivery of live infant, was placed on mothers abdomen for skin to skin and bulb suctioning performed.  Infant delivered position OP over intact perineum.  Nuchal cord: Yes, cord reduced following delivery.    Spontaneous delivery of placenta and IV pitocin given noting uterine atony with bleeding. Cytotec 800 PO given for intermittent uterine atony given risk factors (labor for 30 hours).     No lacerations noted.  Patient tolerated delivery well. Sponge needle and lap counted correctly x2.    After delivery, MD called to patient room as she complained of SOB and shivering. O2 sats were 97%, Bps were normal. Bleeding onto pad was minimal. Patient given oxygen, with improvement in breathing.      Indications: <principal problem not specified>  Pregnancy complicated by:   Patient Active Problem List   Diagnosis    Rubella non-immune status, antepartum    Cystic fibrosis carrier    Encounter for supervision of other normal pregnancy, third trimester    Rupture of membranes with clear amniotic fluid    Anemia during pregnancy     Admitting GA: 36w4d    Delivery Information for  Kelsey Mckay    Birth information:  YOB: 2016   Time of birth: 4:40 AM   Sex: female   Head Delivery Date/Time: 2018  4:40 AM   Delivery type: Vaginal, Spontaneous Delivery   Gestational Age: 36w4d    Delivery Providers    Delivering clinician:  Triny Lewis MD   Provider Role    MD Pratibha Streeter MD April L Bruno, RN Brooke A. Manno, RN Callie R. Crouch, ST              Measurements    Weight:  2560 g  Length:  45.7 cm  Head circumference:  31.8 cm  Chest circumference:  30.5 cm          Assessment    Living status:  Living  Apgars:     1 Minute:   5 Minute:   10 Minute:   15 Minute:   20 Minute:     Skin Color:   0  1       Heart Rate:   2  2        Reflex Irritability:   2  2       Muscle Tone:   2  2       Respiratory Effort:   2  2       Total:   8  9               Apgars Assigned By:  KATE OLIVA         Assisted Delivery Details:    Forceps attempted?:  No  Vacuum extractor attempted?:  No         Shoulder Dystocia    Shoulder dystocia present?:  No           Presentation and Position    Presentation:  Vertex           Interventions/Resuscitation    Method:  Tactile Stimulation       Cord    Vessels:  3 vessels  Complications:  Nuchal  Nuchal Intervention:  reduced  Nuchal Cord Description:  loose nuchal cord  Number of Loops:  1  Delayed Cord Clamping?:  Yes  Cord Clamped Date/Time:  2018  4:42 AM  Cord Blood Disposition:  Sent with Baby  Gases Sent?:  No  Stem Cell Collection (by MD):  No       Placenta    Date and time:  2018  4:43 AM  Removal:  Spontaneous  Appearance:  Intact  Placenta disposition:  discarded           Labor Events:       labor: Yes     Labor Onset Date/Time:         Dilation Complete Date/Time:         Start Pushing Date/Time:       Rupture Date/Time:              Rupture type:           Fluid Amount:        Fluid Color:        Fluid Odor:        Membrane Status (PeriCalm): SRM (Spontaneous Rupture)      Rupture Date/Time (PeriCalm): 2018 22:30:00      Fluid Amount (PeriCalm): Moderate      Fluid Color (PeriCalm): Clear       steroids: Full Course     Antibiotics given for GBS: Yes     Induction:       Indications for induction:        Augmentation: oxytocin     Indications for augmentation: Prolonged ROM;Ineffective Contraction Pattern     Labor complications: None     Additional complications:          Cervical ripening:                     Delivery:      Episiotomy: None     Indication for Episiotomy:       Perineal Lacerations: None Repaired:      Periurethral Laceration: none Repaired:     Labial Laceration: none Repaired:     Sulcus Laceration: none Repaired:     Vaginal Laceration: No Repaired:      Cervical Laceration: No Repaired:     Repair suture: None     Repair # of packets:       Vaginal delivery QBL (mL): 375      QBL (mL): 0     Combined Blood Loss (mL): 375     Vaginal Sweep Performed: Yes     Surgicount Correct:         Other providers:       Anesthesia    Method:  Epidural          Details (if applicable):  Trial of Labor      Categorization:      Priority:     Indications for :     Incision Type:       Additional  information:  Forceps:    Vacuum:    Breech:    Observed anomalies    Other (Comments):

## 2018-07-16 NOTE — LACTATION NOTE
Mother offered to hand express breastmilk for baby. Mother declined. Reviewed the following -    Sit upright and lean forward if possible.   Apply warm, wet baby blanket/towel over breasts for a few minutes followed by gentle breast massage.   Form a C with her hand and place it about 1 inch back from the areola with the nipple centered between her thumb and index finger.   Press, compress, relax:  apply pressure in an inward direction toward the breast without stretching the tissue and then compress the breast tissue between her fingers for a few minutes.   Rotate placement of fingers on the breasts to facilitate emptying.   Collect expressed colostrum/ human milk with a spoon/cup and feed immediately to the baby or place it directly into a sterile storage container for later use.  If stored for later use, place the babys breast milk label (with the date and time of collection and the names of medications) on the container.  Educated on proper handling and storage of expressed breastmilk.

## 2018-07-16 NOTE — NURSING
Patient with complaint of feeling short of breath, chest pain, & shaking.  VS normal, afebrile.  Patient asking for oxygen, sats 98 %. O2 per facemask given to patient.  Fundus firm, moderate lochia.  MD notified, EKG ordered.

## 2018-07-16 NOTE — LACTATION NOTE
07/16/18 [facility]   Maternal Infant Assessment   Breast Shape Bilateral:;pendulous   Breast Density Bilateral:;soft   Areola Bilateral:;elastic   Nipple(s) Bilateral:;everted   Nipple Symptoms tender       Number Scale   Presence of Pain complains of pain/discomfort   Location nipple(s)   Pain Management Interventions (lanolin)   Maternal Infant Feeding   Maternal Emotional State assist needed   Time Spent (min) 15-30 min   Breastfeeding History   Currently Breastfeeding yes   Equipment Type/Education   Pump Type Symphony   Breast Pump Type double electric, hospital grade   Breast Pump Flange Type hard   Breast Pump Flange Size 24 mm   Pumping Frequency (times) (8 or more times daily)   Lactation Referrals   Lactation Consult Pump teaching;Knowledge deficit   Lactation Interventions   Attachment Promotion counseling provided   Breast Care: Breastfeeding lanolin to nipple(s) applied   Breastfeeding Assistance milk expression/pumping;support offered   Maternal Breastfeeding Support lactation counseling provided   assisted pt with pumping. Pt pumped for 40 minutes this am and nipples are sore and cracked at base of nipple. Lanolin applied prior to pumping. Pt left breast too sore to pump. Pt continued to pump on right. Assisted pt with hand expression on left. Pt c/o of soreness. Plan: pt to try to pump both breast at least 8 times daily at highest comfortable level. If unable to pump ion left, pt to try hand expression. Pt to use lanolin prn.

## 2018-07-16 NOTE — PROGRESS NOTES
"LABOR NOTE    S:  Complaints: No.  Epidural working:  yes      O: BP (!) 92/51   Pulse 72   Temp 97.3 °F (36.3 °C) (Temporal)   Resp 18   Ht 5' 3" (1.6 m)   Wt 66.6 kg (146 lb 13.2 oz)   LMP 2017   SpO2 96%   Breastfeeding? No   BMI 26.01 kg/m²       FHT: 115, + accels, intermittent short early decels (resolved spontaneously), Cat 2 (reassuring), moderate BTBV  CTX: q 2-4 minutes, MUS approx 100 on 30U pitocin  SVE: /-2  AROM of forebag    ASSESSMENT:   22 y.o.  at 36w3d, in latent labor with reassuring fetal heart tracing    FHT reassuring    Active Hospital Problems    Diagnosis  POA    Rupture of membranes with clear amniotic fluid [O42.019]  Yes    Anemia during pregnancy [O99.019]  Unknown    Cystic fibrosis carrier [Z14.1]  Not Applicable    Rubella non-immune status, antepartum [O99.89, Z28.3]  Not Applicable      Resolved Hospital Problems    Diagnosis Date Resolved POA   No resolved problems to display.     Labor course  0230: /-3  0430: /-3, pitocin at 6 mU  0800: -3, pit at 10U  1230: -3, placed IUPC, pit at 20U  1430: /-3, pit at 30U  1630: /-3, pit rest  1930: -3, pit restarted @ 27U  2330: -2, rupture of fore bag, pit at 30U  0130: -2, pit at 30U  0330: -2    PLAN:    Continue Close Maternal/Fetal Monitoring  Pitocin Augmentation per protocol  Recheck 2 hours or PRN    Amalia Duque MD  OB/GYN  PGY-1        "

## 2018-07-16 NOTE — PROGRESS NOTES
"LABOR NOTE    S:  Complaints: No.  Epidural working:  yes      O: /60   Pulse 88   Temp 97.3 °F (36.3 °C) (Temporal)   Resp 18   Ht 5' 3" (1.6 m)   Wt 66.6 kg (146 lb 13.2 oz)   LMP 2017   SpO2 100%   Breastfeeding? No   BMI 26.01 kg/m²       FHT: 115 Cat 1 (reassuring), moderate BTBV  CTX: q 2-3 minutes  SVE: -3    ASSESSMENT:   22 y.o.  at 36w3d, in latent labor with reassuring fetal heart tracing    FHT reassuring    Active Hospital Problems    Diagnosis  POA    Rupture of membranes with clear amniotic fluid [O42.019]  Yes    Anemia during pregnancy [O99.019]  Unknown    Cystic fibrosis carrier [Z14.1]  Not Applicable    Rubella non-immune status, antepartum [O99.89, Z28.3]  Not Applicable      Resolved Hospital Problems    Diagnosis Date Resolved POA   No resolved problems to display.     Labor course  0230: /-3  0430: /-3, pitocin at 6 mU  0800: /-3, pit at 10U  1230: /-3, placed IUPC, pit at 20U  1430: /-3, pit at 30U  1630: /-3, pit rest  1930: /-3, pit restarted @ 27U    PLAN:    Continue Close Maternal/Fetal Monitoring  Pitocin Augmentation per protocol  Fetal descent still at -3, will try peanut ball   Recheck 2 hours or PRN    Amalia Duque MD  OB/GYN  PGY-1        "

## 2018-07-17 ENCOUNTER — PATIENT MESSAGE (OUTPATIENT)
Dept: OBSTETRICS AND GYNECOLOGY | Facility: CLINIC | Age: 23
End: 2018-07-17

## 2018-07-17 LAB
BASOPHILS # BLD AUTO: 0.01 K/UL
BASOPHILS NFR BLD: 0.1 %
DIFFERENTIAL METHOD: ABNORMAL
EOSINOPHIL # BLD AUTO: 0 K/UL
EOSINOPHIL NFR BLD: 0.1 %
ERYTHROCYTE [DISTWIDTH] IN BLOOD BY AUTOMATED COUNT: 13.2 %
HCT VFR BLD AUTO: 30.4 %
HGB BLD-MCNC: 9.6 G/DL
LYMPHOCYTES # BLD AUTO: 2.4 K/UL
LYMPHOCYTES NFR BLD: 20.5 %
MCH RBC QN AUTO: 24.9 PG
MCHC RBC AUTO-ENTMCNC: 31.6 G/DL
MCV RBC AUTO: 79 FL
MONOCYTES # BLD AUTO: 0.9 K/UL
MONOCYTES NFR BLD: 8 %
NEUTROPHILS # BLD AUTO: 8.4 K/UL
NEUTROPHILS NFR BLD: 70.9 %
PLATELET # BLD AUTO: 233 K/UL
PMV BLD AUTO: 9.8 FL
RBC # BLD AUTO: 3.86 M/UL
WBC # BLD AUTO: 11.79 K/UL

## 2018-07-17 PROCEDURE — 85025 COMPLETE CBC W/AUTO DIFF WBC: CPT

## 2018-07-17 PROCEDURE — 36415 COLL VENOUS BLD VENIPUNCTURE: CPT

## 2018-07-17 PROCEDURE — 11000001 HC ACUTE MED/SURG PRIVATE ROOM

## 2018-07-17 PROCEDURE — 25000003 PHARM REV CODE 250: Performed by: STUDENT IN AN ORGANIZED HEALTH CARE EDUCATION/TRAINING PROGRAM

## 2018-07-17 PROCEDURE — 99231 SBSQ HOSP IP/OBS SF/LOW 25: CPT | Mod: ,,, | Performed by: OBSTETRICS & GYNECOLOGY

## 2018-07-17 PROCEDURE — 99238 HOSP IP/OBS DSCHRG MGMT 30/<: CPT | Mod: ,,, | Performed by: OBSTETRICS & GYNECOLOGY

## 2018-07-17 RX ORDER — FERROUS SULFATE 325(65) MG
325 TABLET, DELAYED RELEASE (ENTERIC COATED) ORAL DAILY
Status: DISCONTINUED | OUTPATIENT
Start: 2018-07-17 | End: 2018-07-17

## 2018-07-17 RX ADMIN — HYDROCODONE BITARTRATE AND ACETAMINOPHEN 1 TABLET: 10; 325 TABLET ORAL at 06:07

## 2018-07-17 RX ADMIN — DOCUSATE SODIUM 100 MG: 100 CAPSULE, LIQUID FILLED ORAL at 09:07

## 2018-07-17 RX ADMIN — IBUPROFEN 600 MG: 600 TABLET ORAL at 11:07

## 2018-07-17 RX ADMIN — IBUPROFEN 600 MG: 600 TABLET ORAL at 05:07

## 2018-07-17 RX ADMIN — Medication 150 MG: at 09:07

## 2018-07-17 NOTE — LACTATION NOTE
07/17/18 1030   Maternal Infant Assessment   Breast Density Bilateral:;soft   Breasts WDL   Breasts WDL WDL   Pain/Comfort Assessments   Pain Assessment Performed Yes       Number Scale   Presence of Pain denies  (improved from yesterday)   Location nipple(s)   Factors that Relieve Pain (low setting on pump)   Maternal Infant Feeding   Maternal Emotional State relaxed   Time Spent (min) 15-30 min   Breastfeeding Education label/storage of breast milk;milk expression, electric pump   Equipment Type/Education   Pump Type Symphony   Breast Pump Type double electric, hospital grade   Breast Pump Flange Type hard   Breast Pump Flange Size 24 mm   Pumping Frequency (times) (8 or more in 24)   Lactation Referrals   Lactation Consult Pump teaching   Lactation Interventions   Attachment Promotion breastfeeding assistance provided   Breastfeeding Assistance electric breast pump used   Maternal Breastfeeding Support diary/feeding log utilized;encouragement offered;infant-mother separation minimized;lactation counseling provided;maternal hydration promoted;maternal nutrition promoted;maternal rest encouraged   lactation rounds , reviewed pumping for NICU mother.

## 2018-07-17 NOTE — PROGRESS NOTES
POSTPARTUM PROGRESS NOTE     Tosin Mckay is a 22 y.o. female PPD #1 status post Spontaneous vaginal delivery at 36w4d in a pregnancy complicated by anemia, rubella non-immune status, CF carrier (partner not tested, he is incarcerated). Patient is doing well this morning. She denies nausea, vomiting, fever or chills.  Patient reports mild abdominal pain that is adequately relieved by oral pain medications. Lochia is mild and stable. Patient is voiding without difficulty and ambulating with no difficulty. She has passed flatus, and has not had BM.  Patient does plan to breast feed. Pumping for baby in NICU. Pt unsure of what she wants for contraception now and would like to discuss at 6 week postpartum visit.    Objective:       Temp:  [97.6 °F (36.4 °C)-100.9 °F (38.3 °C)] 97.6 °F (36.4 °C)  Pulse:  [] 59  Resp:  [18] 18  SpO2:  [95 %-99 %] 99 %  BP: (111-132)/(57-70) 115/60    General:   alert, appears stated age and cooperative   Lungs:   clear to auscultation bilaterally   Heart:   regular rate and rhythm, S1, S2 normal, no murmur, click, rub or gallop   Abdomen:  soft, non-tender; bowel sounds normal; no masses,  no organomegaly   Uterus:  firm located at the umblicus.    Extremities: peripheral pulses normal, no pedal edema, no clubbing or cyanosis     Lab Review  Recent Results (from the past 4 hour(s))   CBC auto differential    Collection Time: 07/17/18  5:31 AM   Result Value Ref Range    WBC 11.79 3.90 - 12.70 K/uL    RBC 3.86 (L) 4.00 - 5.40 M/uL    Hemoglobin 9.6 (L) 12.0 - 16.0 g/dL    Hematocrit 30.4 (L) 37.0 - 48.5 %    MCV 79 (L) 82 - 98 fL    MCH 24.9 (L) 27.0 - 31.0 pg    MCHC 31.6 (L) 32.0 - 36.0 g/dL    RDW 13.2 11.5 - 14.5 %    Platelets 233 150 - 350 K/uL    MPV 9.8 9.2 - 12.9 fL    Gran # (ANC) 8.4 (H) 1.8 - 7.7 K/uL    Lymph # 2.4 1.0 - 4.8 K/uL    Mono # 0.9 0.3 - 1.0 K/uL    Eos # 0.0 0.0 - 0.5 K/uL    Baso # 0.01 0.00 - 0.20 K/uL    Gran% 70.9 38.0 - 73.0 %    Lymph% 20.5 18.0 -  48.0 %    Mono% 8.0 4.0 - 15.0 %    Eosinophil% 0.1 0.0 - 8.0 %    Basophil% 0.1 0.0 - 1.9 %    Differential Method Automated        I/O    Intake/Output Summary (Last 24 hours) at 18 0616  Last data filed at 18 1015   Gross per 24 hour   Intake           722.42 ml   Output             1800 ml   Net         -1077.58 ml        Assessment:     Patient Active Problem List   Diagnosis    Rubella non-immune status, antepartum    Cystic fibrosis carrier    Encounter for supervision of other normal pregnancy, third trimester    Rupture of membranes with clear amniotic fluid    Anemia during pregnancy     premature rupture of membranes (PPROM) with onset of labor within 24 hours of rupture in third trimester, antepartum     (spontaneous vaginal delivery)        Plan:   1. Postpartum care:  - Patient doing well. Continue routine management and advances.  - Continue PO pain meds. Pain well controlled.  - Heme: H/h 9.1/29.0 > 9.6/30.4  - Encourage ambulation  - Contraception, defer to 6w pp visit, pt unsure  - Lactation consult PRN      Dispo: As patient meets milestones, will plan to discharge PPD#2.    Qiana Ghosh MD   OBGYN, PGY-1

## 2018-07-17 NOTE — PLAN OF CARE
Problem: Patient Care Overview  Goal: Plan of Care Review  Outcome: Ongoing (interventions implemented as appropriate)  Plan of care reviewed. Tolerating diet well. Ambulating and voiding without difficulty. Moderate rubra. Pain controlled with oal pain  Meds.,vital signs stable afebrile. pumping  breasts for baby in NICU. Visiting baby.in NICU.

## 2018-07-17 NOTE — DISCHARGE SUMMARY
Delivery Discharge Summary  Obstetrics      Primary OB Clinician: Dr. Sophie Mayorga    Admission date: 2018  Discharge date: 2018    Disposition: To home, self care    Admit Dx:      Patient Active Problem List   Diagnosis    Rubella non-immune status, antepartum    Cystic fibrosis carrier    Encounter for supervision of other normal pregnancy, third trimester    Rupture of membranes with clear amniotic fluid    Anemia during pregnancy     premature rupture of membranes (PPROM) with onset of labor within 24 hours of rupture in third trimester, antepartum     (spontaneous vaginal delivery)     Discharge Dx:    Patient Active Problem List   Diagnosis    Rubella non-immune status, antepartum    Cystic fibrosis carrier    Encounter for supervision of other normal pregnancy, third trimester    Rupture of membranes with clear amniotic fluid    Anemia during pregnancy     premature rupture of membranes (PPROM) with onset of labor within 24 hours of rupture in third trimester, antepartum     (spontaneous vaginal delivery)       Procedure:     Hospital Course:  Tosin Mckay is a 22 y.o. now , PPD #2 who was admitted on 2018 at 36w4d for SROM. On initial assessment, vital signs were stable and physical exam was normal. Infant was in cephalic presentation. Patient was subsequently admitted to labor and delivery unit with signed consents.  Patient delivered a single viable  female via . Uterine atony was noted with EBL of 400. Patient received cytotec series and methergine x 1. Please see delivery note for further details. Pt was in stable condition post delivery and was transferred to the Mother-Baby Unit. Her postpartum course was uncomplicated. On discharge day, patient's pain is controlled with oral pain medications. Pt is tolerating ambulation without SOB or CP, and PO diet without N/V. Reports lochia is mild. Denies any HA, vision changes, F/C, LE  swelling. Pt in stable condition and ready for discharge. Discharge instructions and precautions reviewed.    Pertinent studies:  Postpartum CBC  Lab Results   Component Value Date    WBC 11.79 2018    HGB 9.6 (L) 2018    HCT 30.4 (L) 2018    MCV 79 (L) 2018     2018       Tubal Ligation: n/a  Feeding Method: breast  Rh Immune Globulin Given(O POS): N/A  Rubella Vaccine Given: Ordered. To be given prior to discharge.  Tdap Vaccine Given: Ordered. To be given prior to discharge.    Delivery:    Episiotomy: None   Lacerations: None   Repair suture: None   Repair # of packets:     Blood loss (ml): 375     Birth information:  YOB: 2016   Time of birth: 4:40 AM   Sex: female   Delivery type: Vaginal, Spontaneous Delivery   Gestational Age: 36w4d    Delivery Clinician:      Other providers:       Additional  information:  Forceps:    Vacuum:    Breech:    Observed anomalies      Living?:           APGARS  One minute Five minutes Ten minutes   Skin color:         Heart rate:         Grimace:         Muscle tone:         Breathing:         Totals: 8  9        Placenta: Delivered:       appearance      Patient Instructions:   Current Discharge Medication List      START taking these medications    Details   docusate sodium (COLACE) 100 MG capsule Take 2 capsules (200 mg total) by mouth 2 (two) times daily as needed for Constipation.  Refills: 0    Associated Diagnoses:  (spontaneous vaginal delivery)      ibuprofen (ADVIL,MOTRIN) 600 MG tablet Take 1 tablet (600 mg total) by mouth every 6 (six) hours as needed.  Qty: 30 tablet, Refills: 2    Associated Diagnoses:  (spontaneous vaginal delivery)         CONTINUE these medications which have NOT CHANGED    Details   ferrous sulfate 325 mg (65 mg iron) Tab tablet Take 1 tablet (325 mg total) by mouth once daily.  Qty: 30 tablet, Refills: 3    Associated Diagnoses: Iron deficiency anemia during pregnancy         STOP  taking these medications       doxylamine succinate (UNISOM, DOXYLAMINE,) 25 mg tablet Comments:   Reason for Stopping:         prenatal vits96-iron fum-folic 27 mg iron- 800 mcg Tab tablet Comments:   Reason for Stopping:         ondansetron (ZOFRAN-ODT) 4 MG TbDL Comments:   Reason for Stopping:                 Discharge Procedure Orders  Diet Adult Regular     Other restrictions (specify):   Order Comments: Pelvic rest for at least 6 weeks. Nothing in vagina - no sex, tampons, or douching for 6 weeks     Notify your health care provider if you experience any of the following:   Order Comments: Heavy vaginal bleeding saturating more than 1 pad per hour for at least 2 hours.     Notify your health care provider if you experience any of the following:  increased confusion or weakness     Notify your health care provider if you experience any of the following:  persistent dizziness, light-headedness, or visual disturbances     Notify your health care provider if you experience any of the following:  severe persistent headache     Notify your health care provider if you experience any of the following:  difficulty breathing or increased cough     Notify your health care provider if you experience any of the following:  severe uncontrolled pain     Notify your health care provider if you experience any of the following:  persistent nausea and vomiting or diarrhea     Notify your health care provider if you experience any of the following:  temperature >100.4     Activity as tolerated       Brooklyn Ashley MD  OBGYN PGY-1    Doing well, no complaints, ready for D/C.

## 2018-07-17 NOTE — PLAN OF CARE
Problem: Breastfeeding (Adult,Obstetrics,Pediatric)  Goal: Signs and Symptoms of Listed Potential Problems Will be Absent, Minimized or Managed (Breastfeeding)  Signs and symptoms of listed potential problems will be absent, minimized or managed by discharge/transition of care (reference Breastfeeding (Adult,Obstetrics,Pediatric) CPG).    07/17/18 1426   Breastfeeding   Problems Assessed (Breastfeeding) all   Problems Present (Breastfeeding) other (see comments)   Follow pumping for NICU

## 2018-07-17 NOTE — PHYSICIAN QUERY
"PT Name: Tosin Mckay  MR #: [id number]    Physician Query Form - Hematology Clarification      CDS/: KAIDEN De Paz,[email or web address]           Contact information:[email or web address]    This form is a permanent document in the medical record.      Query Date: July 17, 2018    By submitting this query, we are merely seeking further clarification of documentation. Please utilize your independent clinical judgment when addressing the question(s) below.    The Medical record contains the following:   Indicators  Supporting Clinical Findings Location in Medical Record   X "Anemia" documented Chronic Anemia H&P 7/15   X H & H = Hgb=9.1-9.6  Hct=29.0-30.4 LAB 7/15-7/17    BP =                     HR=      "GI bleeding" documented      Acute bleeding (Non GI site)      Transfusion(s)     X Treatment: Has been taking iron - restart PP H&P 7/15   X Other:  Normal spontaneous vaginal delivery of live infant L&D Delivery note 7/16     Provider, please specify diagnosis or diagnoses associated with above clinical findings.    [ X ] Iron deficiency anemia  [  ] Other Hematological Diagnosis (please specify): ________CHRONIC_________________________  [  ] Clinically Undetermined       Please document in your progress notes daily for the duration of treatment, until resolved, and include in your discharge summary.                                                                                                      "

## 2018-07-17 NOTE — LACTATION NOTE
This note was copied from a baby's chart.  Lactation Note: Met mother at bedside; Introduced self.  Encouraged pumping 8 or more times in 24 hours and skin to skin care; mom holding baby swaddled upon arrival. Benefits of Kangaroo care discussed. Mom plans to obtain breast pump from [facility] office on Friday. Encouragement and support offered to mom.   Lizzette Coronel, ARMAANN, RN, CLC, IBCLC

## 2018-07-17 NOTE — PHYSICIAN QUERY
PT Name: Tosin Mckay  MR #: [id number]     Physician Query Form - Documentation Clarification      CDS/: KAIDEN De Paz,RNC-MNN            Contact information:[email or web address]    This form is a permanent document in the medical record.     Query Date: July 17, 2018    By submitting this query, we are merely seeking further clarification of documentation. Please utilize your independent clinical judgment when addressing the question(s) below.    The Medical record reflects the following:    Supporting Clinical Findings Location in Medical Record   Spontaneous delivery of placenta and IV pitocin given noting uterine atony with bleeding. Cytotec 800 PO given for intermittent uterine atony given risk factors (labor for 30 hours).     Normal spontaneous vaginal delivery of live infant    Combined Blood Loss (mL): 375 L&D Delivery note 7/16                                                                                      Doctor, Please specify diagnosis or diagnoses associated with above clinical findings.    Provider Use Only      [  ] Uterine atony and bleeding, clinically insignificant  [  ] Immediate post partum hemorrhage  [ x ] Other, please specify:__chronic fe-deficiency anemia without signif decrease h/h after delivery ________________________________________                                                                                                                   [  ] Clinically undetermined

## 2018-07-17 NOTE — ANESTHESIA POSTPROCEDURE EVALUATION
"Anesthesia Post Evaluation    Patient: Tosin Mckay    Procedure(s) Performed: * No procedures listed *    Final Anesthesia Type: epidural  Patient location during evaluation: labor & delivery  Patient participation: Yes- Able to Participate  Level of consciousness: awake and alert  Post-procedure vital signs: reviewed and stable  Pain management: adequate  Airway patency: patent  PONV status at discharge: No PONV  Anesthetic complications: no      Cardiovascular status: hemodynamically stable  Respiratory status: unassisted, spontaneous ventilation and room air  Hydration status: euvolemic  Follow-up not needed.        Visit Vitals  BP (!) 110/55   Pulse 60   Temp 36.4 °C (97.5 °F) (Oral)   Resp 18   Ht 5' 3" (1.6 m)   Wt 66.6 kg (146 lb 13.2 oz)   LMP 11/02/2017   SpO2 97%   Breastfeeding? Yes   BMI 26.01 kg/m²       Pain/Obed Score: Pain Rating Prior to Med Admin: 6 (7/17/2018 11:48 AM)  Pain Rating Post Med Admin: 4 (7/17/2018  6:55 AM)      "

## 2018-07-17 NOTE — PLAN OF CARE
Copied from infant's chart    SOCIAL WORK DISCHARGE PLANNING ASSESSMENT     Sw completed discharge planning assessment with pt's mother in mother's room 620.  Pt's mother was easily engaged. Education on the role of  was provided. Emotional support provided throughout assessment.        Legal Name: Abigail Mckay                             :  2018         Patient Active Problem List   Diagnosis      infant of 36 completed weeks of gestation    Need for observation and evaluation of  for sepsis     infant, 2,500 or more grams    Acute respiratory distress in  with surfactant disorder    Suspected carrier of cystic fibrosis            [facility]irth [facility]           REECE: 18     Birth Weight:   2.56 kg (5 lb 10.3 oz)              Birth Length: 47.0cm               Gestational Age: 36w4d           [place] Assessment    Living status:  Living  Apgars:      1 Minute:   5 Minute:   10 Minute:   15 Minute:   20 Minute:     Skin Color:   0  1          Heart Rate:   2  2          Reflex Irritability:   2  2          Muscle Tone:   2  2          Respiratory Effort:   2  2          Total:   8  9                        Apgars Assigned By:  KATE OLIVA            Mother: Tosin Mckay, age 20,  1997  Address: [address] [place] [address]  Phone: [phone]  Employer: none                       Job Title: n/a  Education: GED     Signed Birth Certificate: No; mom reported that dad will not be involved.     Support person(s): Lizzette Mckay (maternal aunt) [phone] and Tiffany Cevallos (mom's friend) [phone]     Sibling(s): Ildefonso (age 4) and Mayuri (age 2)     Spiritual Affiliation: Yes  Buddhism     Commercial Insurance Coverage: No      [facility] [place] [facility] [place] Primary: Yes Secondary: No   [facility] [employer]      Pediatrician: [facility], Dr. Juno Lin     Nutrition: Expressed Breast Milk                Breast Pump:              No               Plans to obtain from [facility]               WIC:              Mom already certified; will also apply for         Essential Items: (includes car seat, crib/bassinet/pack-n-play, clothing, bottles, diapers, etc.)  Acquired      Transportation: Medicaid transportation      Education: Information given on CPR classes and Physician/NNP daily rounds.      Resources Given: [facility], [facility], Medicaid transportation, Immunizations, Glossary of Commonly Used Terms, SSI Benefits, Preparing for Your Baby's Discharge Home, Support Resources for NICU Families, Insurance Coverage of Breast Pumps and Supplies, Breast Pumps through [facility], [facility], [employer], and [facility].        Potential Eligibility for SSI Benefits: No     Potential Discharge Needs:  None           18 1044   Discharge Assessment   Assessment Type Discharge Planning Assessment   Confirmed/corrected address and phone number on facesheet? Yes   Assessment information obtained from? Caregiver  (mom)   Is patient able to care for self after discharge? No;Patient is of pediatric age   Discharge Plan A Home with family;[facility]      [facility]  NICU   Phone [phone]. [phone]  [email or web address]

## 2018-07-18 VITALS
TEMPERATURE: 98 F | WEIGHT: 146.81 LBS | HEIGHT: 63 IN | RESPIRATION RATE: 18 BRPM | BODY MASS INDEX: 26.01 KG/M2 | OXYGEN SATURATION: 98 % | HEART RATE: 81 BPM | SYSTOLIC BLOOD PRESSURE: 106 MMHG | DIASTOLIC BLOOD PRESSURE: 61 MMHG

## 2018-07-18 PROCEDURE — 90471 IMMUNIZATION ADMIN: CPT | Performed by: STUDENT IN AN ORGANIZED HEALTH CARE EDUCATION/TRAINING PROGRAM

## 2018-07-18 PROCEDURE — 90710 MMRV VACCINE SC: CPT | Performed by: STUDENT IN AN ORGANIZED HEALTH CARE EDUCATION/TRAINING PROGRAM

## 2018-07-18 PROCEDURE — 63600175 PHARM REV CODE 636 W HCPCS: Performed by: STUDENT IN AN ORGANIZED HEALTH CARE EDUCATION/TRAINING PROGRAM

## 2018-07-18 PROCEDURE — 99233 SBSQ HOSP IP/OBS HIGH 50: CPT | Mod: ,,, | Performed by: OBSTETRICS & GYNECOLOGY

## 2018-07-18 PROCEDURE — 25000003 PHARM REV CODE 250: Performed by: STUDENT IN AN ORGANIZED HEALTH CARE EDUCATION/TRAINING PROGRAM

## 2018-07-18 RX ORDER — DOCUSATE SODIUM 100 MG/1
200 CAPSULE, LIQUID FILLED ORAL 2 TIMES DAILY PRN
Qty: 40 CAPSULE | Refills: 0 | COMMUNITY
Start: 2018-07-18 | End: 2019-02-05

## 2018-07-18 RX ADMIN — HYDROCODONE BITARTRATE AND ACETAMINOPHEN 1 TABLET: 5; 325 TABLET ORAL at 12:07

## 2018-07-18 RX ADMIN — MEASLES, MUMPS, AND RUBELLA VIRUS VACCINE LIVE 0.5 ML: 1000; 12500; 1000 INJECTION, POWDER, LYOPHILIZED, FOR SUSPENSION SUBCUTANEOUS at 10:07

## 2018-07-18 RX ADMIN — HYDROCODONE BITARTRATE AND ACETAMINOPHEN 1 TABLET: 5; 325 TABLET ORAL at 07:07

## 2018-07-18 RX ADMIN — IBUPROFEN 600 MG: 600 TABLET ORAL at 12:07

## 2018-07-18 RX ADMIN — IBUPROFEN 600 MG: 600 TABLET ORAL at 05:07

## 2018-07-18 NOTE — LACTATION NOTE
This note was copied from a baby's chart.  Met mother at [facility]'s bedside this morning; introduced self; inquired if mother was planning to stay at [facility]'s bedside over night after being discharged from [facility]; after discussing sleeping options (at bedside in chair) mother voiced that she would likely come visit daily but not stay overnight (mother also has two other children at home); mother has a WIC appointment scheduled for Friday afternoon (she has received a letter of medical necessity to obtain an electric breast pump); offered to provide mother with NICU marcos pump until WIC pump is obtained and mother has successfully transitioned to that breast pump; mother acknowledged offer and took marcos loan paperwork to complete - mother to request LC when she returns to NICU with completed paperwork so marcos pump may be given to her    Silvia Smith, ARMAANN, RN, IBCLC

## 2018-07-18 NOTE — PROGRESS NOTES
POSTPARTUM PROGRESS NOTE     Tosin Mckay is a 22 y.o. female PPD #2 status post Spontaneous vaginal delivery at 36w4d in a pregnancy complicated by atony, rubella nonimmune status, and anemia. Patient is doing well this morning. She denies nausea, vomiting, fever or chills.  Patient reports moderate abdominal pain that is adequately relieved by oral pain medications. Lochia is mild to moderate  and stable. Patient is voiding without difficulty and ambulating with no difficulty. She has passed flatus, and has not had BM.  Patient does plan to breast feed. Contraception per Dr. Mayorga.   Objective:       Temp:  [97.5 °F (36.4 °C)-98 °F (36.7 °C)] 98 °F (36.7 °C)  Pulse:  [60-86] 70  Resp:  [18] 18  SpO2:  [97 %-99 %] 99 %  BP: (110-127)/(55-70) 127/60    General:   alert, appears stated age and cooperative   Lungs:   clear to auscultation bilaterally   Heart:   regular rate and rhythm, S1, S2 normal, no murmur, click, rub or gallop   Abdomen:  soft, non-tender; bowel sounds normal; no masses,  no organomegaly   Uterus:  firm located at the umblicus.            Extremities: peripheral pulses normal, no pedal edema, no clubbing or cyanosis     Lab Review  No results found for this or any previous visit (from the past 4 hour(s)).    I/O  No intake or output data in the 24 hours ending 18 0626     Assessment:     Patient Active Problem List   Diagnosis    Rubella non-immune status, antepartum    Cystic fibrosis carrier    Encounter for supervision of other normal pregnancy, third trimester    Rupture of membranes with clear amniotic fluid    Anemia during pregnancy     premature rupture of membranes (PPROM) with onset of labor within 24 hours of rupture in third trimester, antepartum     (spontaneous vaginal delivery)        Plan:   1. Postpartum care:  - Patient doing well. Continue routine management and advances.  - Continue PO pain meds. Pain well controlled.  - Heme: H/h  >   -  Encourage ambulation  - Contraception per Dr. Mayorga  - Lactation prn    2. Atony/anemia  - Vital signs stable. Asymptomatic  - Received cytotec series, methergine x 1  - Continue iron and colace    3. Rubella nonimmune status  - MMR ordered. To be given prior to discharge.      Dispo: As patient meets milestones, will plan to discharge today.    Brooklyn Ashley MD  OBGYN PGY-1    Doing well, no questions,no problems.  I have reviewed the resident's note, evaluated the patient and agree with the diagnosis and management plan

## 2018-07-18 NOTE — PLAN OF CARE
Problem: Patient Care Overview  Goal: Plan of Care Review  Outcome: Outcome(s) achieved Date Met: 07/18/18  Based on parents stated goals, POC for today is to double pump 8 or more times in 24 hour period, using proper hand hygiene, setup, storage, transport, and cleaning. Pt to use techniques to maximize milk production, such as warm compresses, pump at baby bedside, smell used baby blankets. Pt to document pumpings in pump log. Pt to stay hydrated, eat well, and rest as needed. PT verbalized understanding.

## 2018-07-18 NOTE — LACTATION NOTE
Pt c/o nipple pain when pumping and L nipple currently tender, with healing scabs. LC provided teaching on nipple care, provided more lanolin. Pt reports nipples rub on side of flange when pumping; pt to use size 27mm flanges for next pumping and see if pain decreases. Pt reports she pumped 120ml in last pumping session;  instructed pt to switch to Maintenance program and provided demonstration. Pt does not have a pump at home; but has a [facility] appt on Friday afternoon.  contacted NICU LC to see if marcos pump available for pt to loan until [facility] appt. And pt aware to use Symphony pump at baby's bedside in NICU.  did DC teaching for NICU mother pumping for her baby. Pt verbalized understanding. Pt has NICU Blue folder for lactation. Reviewed how to work pump, keep track of pumpings, label nicu breastmilk, clean pump parts and safely store and transport milk to NICU. Pt aware to pump 8 or more times a day for 15-20 minutes. Mother has the [facility] phone number and the NICU LC phone number to call for further questions.      07/18/18 0840   Maternal Infant Assessment   Nipple Symptoms left:;tender  (healing)   Pain/Comfort Assessments   Pain Assessment Performed Yes       Number Scale   Presence of Pain denies   Maternal Infant Feeding   Maternal Emotional State independent   Presence of Pain no   Breastfeeding History   Currently Breastfeeding yes   Equipment Type/Education   Pump Type Symphony   Breast Pump Type double electric, hospital grade   Breast Pump Flange Type hard   Breast Pump Flange Size 27 mm   Breast Pumping Bilateral Breasts:   Pumping Frequency (times) (8 or more in 24)   Lactation Referrals   Lactation Consult Pump teaching;Knowledge deficit   Lactation Referrals outpatient lactation program;WIC (women, infants and children) program;support group   Lactation Interventions   Attachment Promotion counseling provided;role responsibility promoted;skin-to-skin contact encouraged;privacy provided    Breastfeeding Assistance milk expression/pumping;support offered   Maternal Breastfeeding Support maternal rest encouraged;maternal nutrition promoted;maternal hydration promoted;lactation counseling provided;encouragement offered;diary/feeding log utilized

## 2018-07-31 ENCOUNTER — PATIENT MESSAGE (OUTPATIENT)
Dept: OBSTETRICS AND GYNECOLOGY | Facility: CLINIC | Age: 23
End: 2018-07-31

## 2018-07-31 DIAGNOSIS — N61.0 MASTITIS: Primary | ICD-10-CM

## 2018-08-01 RX ORDER — DICLOXACILLIN SODIUM 250 MG/1
250 CAPSULE ORAL 4 TIMES DAILY
Qty: 40 CAPSULE | Refills: 0 | Status: SHIPPED | OUTPATIENT
Start: 2018-08-01 | End: 2018-08-11

## 2018-08-15 ENCOUNTER — PATIENT MESSAGE (OUTPATIENT)
Dept: OBSTETRICS AND GYNECOLOGY | Facility: CLINIC | Age: 23
End: 2018-08-15

## 2018-08-16 RX ORDER — ESCITALOPRAM OXALATE 10 MG/1
10 TABLET ORAL DAILY
Qty: 30 TABLET | Refills: 11 | Status: SHIPPED | OUTPATIENT
Start: 2018-08-16 | End: 2019-02-05

## 2018-08-20 RX ORDER — IBUPROFEN 600 MG/1
600 TABLET ORAL EVERY 6 HOURS PRN
Qty: 30 TABLET | Refills: 2 | Status: SHIPPED | OUTPATIENT
Start: 2018-08-20 | End: 2019-02-05

## 2018-08-30 ENCOUNTER — PATIENT MESSAGE (OUTPATIENT)
Dept: OBSTETRICS AND GYNECOLOGY | Facility: CLINIC | Age: 23
End: 2018-08-30

## 2018-09-06 ENCOUNTER — PATIENT MESSAGE (OUTPATIENT)
Dept: OBSTETRICS AND GYNECOLOGY | Facility: CLINIC | Age: 23
End: 2018-09-06

## 2018-09-13 ENCOUNTER — PATIENT MESSAGE (OUTPATIENT)
Dept: OBSTETRICS AND GYNECOLOGY | Facility: CLINIC | Age: 23
End: 2018-09-13

## 2018-09-25 ENCOUNTER — PATIENT MESSAGE (OUTPATIENT)
Dept: OBSTETRICS AND GYNECOLOGY | Facility: CLINIC | Age: 23
End: 2018-09-25

## 2018-09-27 ENCOUNTER — POSTPARTUM VISIT (OUTPATIENT)
Dept: OBSTETRICS AND GYNECOLOGY | Facility: CLINIC | Age: 23
End: 2018-09-27
Payer: MEDICAID

## 2018-09-27 VITALS
HEIGHT: 63 IN | DIASTOLIC BLOOD PRESSURE: 68 MMHG | WEIGHT: 148.81 LBS | SYSTOLIC BLOOD PRESSURE: 116 MMHG | BODY MASS INDEX: 26.37 KG/M2

## 2018-09-27 PROBLEM — O99.019 ANEMIA DURING PREGNANCY: Status: RESOLVED | Noted: 2018-07-15 | Resolved: 2018-09-27

## 2018-09-27 PROBLEM — Z34.83 ENCOUNTER FOR SUPERVISION OF OTHER NORMAL PREGNANCY, THIRD TRIMESTER: Status: RESOLVED | Noted: 2018-06-18 | Resolved: 2018-09-27

## 2018-09-27 PROCEDURE — 99213 OFFICE O/P EST LOW 20 MIN: CPT | Mod: PBBFAC | Performed by: OBSTETRICS & GYNECOLOGY

## 2018-09-27 PROCEDURE — 99999 PR PBB SHADOW E&M-EST. PATIENT-LVL III: CPT | Mod: PBBFAC,,, | Performed by: OBSTETRICS & GYNECOLOGY

## 2018-09-27 RX ORDER — NORGESTIMATE AND ETHINYL ESTRADIOL 0.25-0.035
1 KIT ORAL DAILY
Qty: 28 TABLET | Refills: 11 | Status: SHIPPED | OUTPATIENT
Start: 2018-09-27 | End: 2019-02-05

## 2018-09-27 NOTE — PROGRESS NOTES
Tosin Mckay is a 23 y.o. female  presents for a postpartum visit.  She is status post  8 weeks ago.  Her hospitalization was not complicated.  She is not breastfeeding.  She desires oral contraceptives (estrogen/progesterone) for contraception.  She states her depression much improved since starting Lexapro 10mg po about 6 weeks ago. Denies SI, HI.  She has resumed menses since delivery (18). She has not resumed intercourse since delivery.    Her last pap was (none)    Past Medical History:   Diagnosis Date    Rubella non-immune status, antepartum      History reviewed. No pertinent surgical history.  Review of patient's allergies indicates:  No Known Allergies    Current Outpatient Medications:     escitalopram oxalate (LEXAPRO) 10 MG tablet, Take 1 tablet (10 mg total) by mouth once daily., Disp: 30 tablet, Rfl: 11    docusate sodium (COLACE) 100 MG capsule, Take 2 capsules (200 mg total) by mouth 2 (two) times daily as needed for Constipation., Disp: 40 capsule, Rfl: 0    ferrous sulfate 325 mg (65 mg iron) Tab tablet, Take 1 tablet (325 mg total) by mouth once daily., Disp: 30 tablet, Rfl: 3    ibuprofen (ADVIL,MOTRIN) 600 MG tablet, Take 1 tablet (600 mg total) by mouth every 6 (six) hours as needed., Disp: 30 tablet, Rfl: 2      Vitals:    18 1616   BP: 116/68       GENERAL: healthy, alert, no distress  ABDOMEN: Normal, benign.  Psych: nl affect, AAO x 3    Assessment:  Normal postpartum exam. Instrcutions given for OCPs, will call into pharmacy. Given depression precautions. F/u for annual exam.     Plan:  Routine follow up.

## 2019-01-19 ENCOUNTER — PATIENT MESSAGE (OUTPATIENT)
Dept: OBSTETRICS AND GYNECOLOGY | Facility: CLINIC | Age: 24
End: 2019-01-19

## 2019-02-05 ENCOUNTER — HOSPITAL ENCOUNTER (EMERGENCY)
Facility: HOSPITAL | Age: 24
Discharge: HOME | End: 2019-02-05
Payer: MEDICAID

## 2019-02-05 VITALS
BODY MASS INDEX: 23.21 KG/M2 | SYSTOLIC BLOOD PRESSURE: 112 MMHG | HEART RATE: 94 BPM | TEMPERATURE: 98 F | RESPIRATION RATE: 18 BRPM | HEIGHT: 63 IN | OXYGEN SATURATION: 97 % | WEIGHT: 131 LBS | DIASTOLIC BLOOD PRESSURE: 79 MMHG

## 2019-02-05 DIAGNOSIS — J11.1 INFLUENZA: Primary | ICD-10-CM

## 2019-02-05 LAB
INFLUENZA A, MOLECULAR: NEGATIVE
INFLUENZA A, MOLECULAR: NEGATIVE
INFLUENZA B, MOLECULAR: NEGATIVE
INFLUENZA B, MOLECULAR: NEGATIVE
SPECIMEN SOURCE: NORMAL
SPECIMEN SOURCE: NORMAL

## 2019-02-05 PROCEDURE — 87502 INFLUENZA DNA AMP PROBE: CPT

## 2019-02-05 PROCEDURE — 99284 EMERGENCY DEPT VISIT MOD MDM: CPT

## 2019-02-05 RX ORDER — OSELTAMIVIR PHOSPHATE 75 MG/1
75 CAPSULE ORAL 2 TIMES DAILY
Qty: 10 CAPSULE | Refills: 0 | Status: SHIPPED | OUTPATIENT
Start: 2019-02-05 | End: 2019-02-10

## 2019-02-05 NOTE — ED NOTES
DISCHARGE INSTRUCTIONS GIVEN, DISCUSSED MEDICATIONS AND FOLLOW UP CARE, PATIENT VERBALIZED UNDERSTANDING, NO QUESTIONS OR COMPLAINTS AT TIME, ENCOURAGED TO RETURN FOR NEW OR WORSENING SYMPTOMS. PATIENT ESCORTED TO REGISTRATION W/O BROOK. RENO KUNZ RN

## 2019-02-05 NOTE — ED PROVIDER NOTES
Encounter Date: 2/5/2019       History     Chief Complaint   Patient presents with    Cough     onset yesterday    Nasal Congestion     Cough cold headache sore throat and myalgias and fever in a flu epidemic          Review of patient's allergies indicates:  No Known Allergies  Past Medical History:   Diagnosis Date    Rubella non-immune status, antepartum      History reviewed. No pertinent surgical history.  Family History   Problem Relation Age of Onset    Hypertension Father     Breast cancer Neg Hx     Colon cancer Neg Hx     Diabetes Neg Hx     Eclampsia Neg Hx     Stroke Neg Hx     Miscarriages / Stillbirths Neg Hx     Ovarian cancer Neg Hx      Social History     Tobacco Use    Smoking status: Never Smoker    Smokeless tobacco: Never Used   Substance Use Topics    Alcohol use: No    Drug use: No     Review of Systems   Constitutional: Positive for chills, fatigue and fever.   HENT: Positive for postnasal drip and sore throat.    Respiratory: Negative for shortness of breath.    Cardiovascular: Negative for chest pain.   Gastrointestinal: Negative for nausea.   Genitourinary: Negative for dysuria.   Musculoskeletal: Positive for arthralgias and myalgias. Negative for back pain.   Skin: Negative for rash.   Neurological: Negative for weakness.   Hematological: Does not bruise/bleed easily.   All other systems reviewed and are negative.      Physical Exam     Initial Vitals [02/05/19 0949]   BP Pulse Resp Temp SpO2   112/79 94 18 98.2 °F (36.8 °C) 97 %      MAP       --         Physical Exam    Nursing note and vitals reviewed.  Constitutional: Vital signs are normal. She appears well-developed and well-nourished. She is active and cooperative.   HENT:   Head: Atraumatic.   Right Ear: External ear normal.   Left Ear: External ear normal.   Nose: Nose normal.   Mouth/Throat: Oropharynx is clear and moist.   Clear coryza   Eyes: Conjunctivae, EOM and lids are normal. Pupils are equal, round, and  reactive to light. Lids are everted and swept, no foreign bodies found.   Neck: Trachea normal, normal range of motion and full passive range of motion without pain. Neck supple.   Cardiovascular: Normal rate, regular rhythm, S1 normal, S2 normal, normal heart sounds, intact distal pulses and normal pulses.  No extrasystoles are present.    Pulmonary/Chest: She has rhonchi.   Abdominal: Soft. Normal appearance and bowel sounds are normal.   Musculoskeletal: Normal range of motion.   Neurological: She is alert. She has normal reflexes. GCS eye subscore is 4. GCS verbal subscore is 5. GCS motor subscore is 6.   Skin: Skin is warm, dry and intact. Capillary refill takes less than 2 seconds.   Psychiatric: She has a normal mood and affect. Her speech is normal and behavior is normal. Cognition and memory are normal.         ED Course   Procedures  Labs Reviewed   INFLUENZA A & B BY MOLECULAR   INFLUENZA A & B BY MOLECULAR          Imaging Results    None          Medical Decision Making:   Clinical Tests:   Lab Tests: Ordered and Reviewed       <> Summary of Lab: Signs and symptoms of influenza and epidemic all 3 of her children are sick with similar.  Flu swab is negative.  Patient is given is a flu is a preferably or Tamiflu                      Clinical Impression:   The encounter diagnosis was Influenza.      Disposition:   Disposition: Discharged  Condition: Stable                        Doroteo Bone MD  02/05/19 7243     Inflammation Suggestive Of Cancer Camouflage Histology Text: There was a dense lymphocytic infiltrate which prevented adequate histologic evaluation of adjacent structures.

## 2019-05-08 ENCOUNTER — HOSPITAL ENCOUNTER (EMERGENCY)
Facility: HOSPITAL | Age: 24
Discharge: HOME | End: 2019-05-08
Attending: INTERNAL MEDICINE

## 2019-05-08 ENCOUNTER — HOSPITAL ENCOUNTER (EMERGENCY)
Facility: HOSPITAL | Age: 24
Discharge: HOME | End: 2019-05-08
Attending: FAMILY MEDICINE

## 2019-05-08 VITALS
HEIGHT: 63 IN | SYSTOLIC BLOOD PRESSURE: 114 MMHG | WEIGHT: 120 LBS | TEMPERATURE: 99 F | HEART RATE: 89 BPM | DIASTOLIC BLOOD PRESSURE: 76 MMHG | BODY MASS INDEX: 21.26 KG/M2 | OXYGEN SATURATION: 97 % | RESPIRATION RATE: 18 BRPM

## 2019-05-08 VITALS
WEIGHT: 120 LBS | BODY MASS INDEX: 21.26 KG/M2 | DIASTOLIC BLOOD PRESSURE: 75 MMHG | SYSTOLIC BLOOD PRESSURE: 122 MMHG | OXYGEN SATURATION: 98 % | RESPIRATION RATE: 20 BRPM | HEART RATE: 100 BPM | TEMPERATURE: 100 F

## 2019-05-08 DIAGNOSIS — R31.9 URINARY TRACT INFECTION WITH HEMATURIA, SITE UNSPECIFIED: Primary | ICD-10-CM

## 2019-05-08 DIAGNOSIS — R50.9 FEVER, UNSPECIFIED FEVER CAUSE: ICD-10-CM

## 2019-05-08 DIAGNOSIS — J04.10 ACUTE TRACHEITIS WITHOUT AIRWAY OBSTRUCTION: Primary | ICD-10-CM

## 2019-05-08 DIAGNOSIS — N39.0 URINARY TRACT INFECTION WITH HEMATURIA, SITE UNSPECIFIED: Primary | ICD-10-CM

## 2019-05-08 DIAGNOSIS — N30.00 ACUTE CYSTITIS WITHOUT HEMATURIA: ICD-10-CM

## 2019-05-08 LAB
ALBUMIN SERPL BCP-MCNC: 4 G/DL (ref 3.5–5.2)
ALP SERPL-CCNC: 43 U/L (ref 55–135)
ALT SERPL W/O P-5'-P-CCNC: 15 U/L (ref 10–44)
ANION GAP SERPL CALC-SCNC: 10 MMOL/L (ref 8–16)
AST SERPL-CCNC: 23 U/L (ref 10–40)
B-HCG UR QL: NEGATIVE
BACTERIA #/AREA URNS HPF: ABNORMAL /HPF
BASOPHILS # BLD AUTO: 0.06 K/UL (ref 0–0.2)
BASOPHILS NFR BLD: 0.5 % (ref 0–1.9)
BILIRUB SERPL-MCNC: 0.5 MG/DL (ref 0.1–1)
BILIRUB UR QL STRIP: NEGATIVE
BUN SERPL-MCNC: 6 MG/DL (ref 6–20)
CALCIUM SERPL-MCNC: 8.8 MG/DL (ref 8.7–10.5)
CHLORIDE SERPL-SCNC: 103 MMOL/L (ref 95–110)
CLARITY UR: CLEAR
CO2 SERPL-SCNC: 20 MMOL/L (ref 23–29)
COLOR UR: YELLOW
CREAT SERPL-MCNC: 0.6 MG/DL (ref 0.5–1.4)
DIFFERENTIAL METHOD: ABNORMAL
EOSINOPHIL # BLD AUTO: 0 K/UL (ref 0–0.5)
EOSINOPHIL NFR BLD: 0.2 % (ref 0–8)
ERYTHROCYTE [DISTWIDTH] IN BLOOD BY AUTOMATED COUNT: 14 % (ref 11.5–14.5)
EST. GFR  (AFRICAN AMERICAN): >60 ML/MIN/1.73 M^2
EST. GFR  (NON AFRICAN AMERICAN): >60 ML/MIN/1.73 M^2
GLUCOSE SERPL-MCNC: 115 MG/DL (ref 70–110)
GLUCOSE UR QL STRIP: NEGATIVE
HCT VFR BLD AUTO: 36.5 % (ref 37–48.5)
HGB BLD-MCNC: 11.8 G/DL (ref 12–16)
HGB UR QL STRIP: ABNORMAL
HYALINE CASTS #/AREA URNS LPF: ABNORMAL /LPF
IMM GRANULOCYTES # BLD AUTO: 0.04 K/UL (ref 0–0.04)
IMM GRANULOCYTES NFR BLD AUTO: 0.3 % (ref 0–0.5)
INFLUENZA A, MOLECULAR: NEGATIVE
INFLUENZA B, MOLECULAR: NEGATIVE
KETONES UR QL STRIP: ABNORMAL
LEUKOCYTE ESTERASE UR QL STRIP: ABNORMAL
LYMPHOCYTES # BLD AUTO: 0.9 K/UL (ref 1–4.8)
LYMPHOCYTES NFR BLD: 7.3 % (ref 18–48)
MCH RBC QN AUTO: 27.4 PG (ref 27–31)
MCHC RBC AUTO-ENTMCNC: 32.3 G/DL (ref 32–36)
MCV RBC AUTO: 85 FL (ref 82–98)
MICROSCOPIC COMMENT: ABNORMAL
MONOCYTES # BLD AUTO: 0.9 K/UL (ref 0.3–1)
MONOCYTES NFR BLD: 7.5 % (ref 4–15)
NEUTROPHILS # BLD AUTO: 10.3 K/UL (ref 1.8–7.7)
NEUTROPHILS NFR BLD: 84.2 % (ref 38–73)
NITRITE UR QL STRIP: POSITIVE
NON-SQ EPI CELLS #/AREA URNS HPF: 1 /HPF
NRBC BLD-RTO: 0 /100 WBC
PH UR STRIP: 6 [PH] (ref 5–8)
PLATELET # BLD AUTO: 196 K/UL (ref 150–350)
PMV BLD AUTO: 10.5 FL (ref 9.2–12.9)
POTASSIUM SERPL-SCNC: 3.1 MMOL/L (ref 3.5–5.1)
PROT SERPL-MCNC: 6.9 G/DL (ref 6–8.4)
PROT UR QL STRIP: ABNORMAL
RBC # BLD AUTO: 4.3 M/UL (ref 4–5.4)
RBC #/AREA URNS HPF: 5 /HPF (ref 0–4)
SODIUM SERPL-SCNC: 133 MMOL/L (ref 136–145)
SP GR UR STRIP: 1.02 (ref 1–1.03)
SPECIMEN SOURCE: NORMAL
SQUAMOUS #/AREA URNS HPF: 2 /HPF
URN SPEC COLLECT METH UR: ABNORMAL
UROBILINOGEN UR STRIP-ACNC: NEGATIVE EU/DL
WBC # BLD AUTO: 12.28 K/UL (ref 3.9–12.7)
WBC #/AREA URNS HPF: 16 /HPF (ref 0–5)

## 2019-05-08 PROCEDURE — 87088 URINE BACTERIA CULTURE: CPT

## 2019-05-08 PROCEDURE — 74176 CT ABD & PELVIS W/O CONTRAST: CPT | Mod: 26,,, | Performed by: RADIOLOGY

## 2019-05-08 PROCEDURE — 87086 URINE CULTURE/COLONY COUNT: CPT

## 2019-05-08 PROCEDURE — 25000003 PHARM REV CODE 250: Performed by: FAMILY MEDICINE

## 2019-05-08 PROCEDURE — 99283 EMERGENCY DEPT VISIT LOW MDM: CPT

## 2019-05-08 PROCEDURE — 81000 URINALYSIS NONAUTO W/SCOPE: CPT

## 2019-05-08 PROCEDURE — 74176 CT RENAL STONE STUDY ABD PELVIS WO: ICD-10-PCS | Mod: 26,,, | Performed by: RADIOLOGY

## 2019-05-08 PROCEDURE — 25000003 PHARM REV CODE 250: Performed by: NURSE PRACTITIONER

## 2019-05-08 PROCEDURE — 87077 CULTURE AEROBIC IDENTIFY: CPT

## 2019-05-08 PROCEDURE — 87186 SC STD MICRODIL/AGAR DIL: CPT

## 2019-05-08 PROCEDURE — 87502 INFLUENZA DNA AMP PROBE: CPT

## 2019-05-08 PROCEDURE — 74176 CT ABD & PELVIS W/O CONTRAST: CPT | Mod: TC

## 2019-05-08 PROCEDURE — 99284 EMERGENCY DEPT VISIT MOD MDM: CPT | Mod: 25,27

## 2019-05-08 PROCEDURE — 85025 COMPLETE CBC W/AUTO DIFF WBC: CPT

## 2019-05-08 PROCEDURE — 80053 COMPREHEN METABOLIC PANEL: CPT

## 2019-05-08 PROCEDURE — 81025 URINE PREGNANCY TEST: CPT

## 2019-05-08 PROCEDURE — 96372 THER/PROPH/DIAG INJ SC/IM: CPT

## 2019-05-08 PROCEDURE — 25000003 PHARM REV CODE 250: Performed by: INTERNAL MEDICINE

## 2019-05-08 PROCEDURE — 36415 COLL VENOUS BLD VENIPUNCTURE: CPT

## 2019-05-08 PROCEDURE — 63600175 PHARM REV CODE 636 W HCPCS: Performed by: NURSE PRACTITIONER

## 2019-05-08 RX ORDER — ACETAMINOPHEN 325 MG/1
975 TABLET ORAL
Status: COMPLETED | OUTPATIENT
Start: 2019-05-08 | End: 2019-05-08

## 2019-05-08 RX ORDER — CEPHALEXIN 250 MG/1
250 CAPSULE ORAL 4 TIMES DAILY
Qty: 28 CAPSULE | Refills: 0 | Status: SHIPPED | OUTPATIENT
Start: 2019-05-08 | End: 2019-05-15

## 2019-05-08 RX ORDER — CEFTRIAXONE 1 G/1
1 INJECTION, POWDER, FOR SOLUTION INTRAMUSCULAR; INTRAVENOUS
Status: COMPLETED | OUTPATIENT
Start: 2019-05-08 | End: 2019-05-08

## 2019-05-08 RX ORDER — POTASSIUM CHLORIDE 750 MG/1
10 TABLET, EXTENDED RELEASE ORAL DAILY
Qty: 5 TABLET | Refills: 0 | Status: SHIPPED | OUTPATIENT
Start: 2019-05-08 | End: 2019-05-13

## 2019-05-08 RX ORDER — IBUPROFEN 600 MG/1
600 TABLET ORAL
Status: COMPLETED | OUTPATIENT
Start: 2019-05-08 | End: 2019-05-08

## 2019-05-08 RX ORDER — ACETAMINOPHEN 500 MG
1000 TABLET ORAL
Status: DISCONTINUED | OUTPATIENT
Start: 2019-05-08 | End: 2019-05-08

## 2019-05-08 RX ORDER — PHENAZOPYRIDINE HYDROCHLORIDE 200 MG/1
200 TABLET, FILM COATED ORAL 3 TIMES DAILY
Qty: 6 TABLET | Refills: 0 | Status: SHIPPED | OUTPATIENT
Start: 2019-05-08 | End: 2019-05-10

## 2019-05-08 RX ORDER — ONDANSETRON 4 MG/1
4 TABLET, FILM COATED ORAL EVERY 8 HOURS PRN
Qty: 10 TABLET | Refills: 0 | Status: SHIPPED | OUTPATIENT
Start: 2019-05-08 | End: 2019-06-28

## 2019-05-08 RX ADMIN — IBUPROFEN 600 MG: 600 TABLET ORAL at 11:05

## 2019-05-08 RX ADMIN — IBUPROFEN 600 MG: 600 TABLET ORAL at 12:05

## 2019-05-08 RX ADMIN — ACETAMINOPHEN 975 MG: 325 TABLET ORAL at 02:05

## 2019-05-08 RX ADMIN — CEFTRIAXONE SODIUM 1 G: 1 INJECTION, POWDER, FOR SOLUTION INTRAMUSCULAR; INTRAVENOUS at 03:05

## 2019-05-08 RX ADMIN — ACETAMINOPHEN 975 MG: 325 TABLET ORAL at 10:05

## 2019-05-08 NOTE — DISCHARGE INSTRUCTIONS
Take medication as prescribed  Increase fluids with drinks containing electrolytes   Tylenol or Motrin as needed for fever

## 2019-05-08 NOTE — ED TRIAGE NOTES
"" I was throwing up at work and I got the chills right after" reports vomiting " twice" described as " like a yellow green color" reports abd pain and back pain, reports mid abd pain after vomiting, reports back pain after vomiting, rates pain 7/10  "

## 2019-05-08 NOTE — ED PROVIDER NOTES
Encounter Date: 5/8/2019       History     Chief Complaint   Patient presents with    Cough    Chills     Tosin Mckay is a 23 y.o female with no sign PMHx. She presents to ED with complaint of fever, chills, vomiting (x2) while at work today.  She complaints of abdominal pain, flank and low back pain.   She reports painful urination, urgency and frequency x 1 week  No vaginal discharge. No vaginal bleed. No pelvic pain          Review of patient's allergies indicates:  No Known Allergies  Past Medical History:   Diagnosis Date    Rubella non-immune status, antepartum      History reviewed. No pertinent surgical history.  Family History   Problem Relation Age of Onset    Hypertension Father     Breast cancer Neg Hx     Colon cancer Neg Hx     Diabetes Neg Hx     Eclampsia Neg Hx     Stroke Neg Hx     Miscarriages / Stillbirths Neg Hx     Ovarian cancer Neg Hx      Social History     Tobacco Use    Smoking status: Never Smoker    Smokeless tobacco: Never Used   Substance Use Topics    Alcohol use: No    Drug use: No     Review of Systems   Constitutional: Positive for chills and fever. Negative for activity change, appetite change, diaphoresis, fatigue and unexpected weight change.   HENT: Negative.  Negative for sore throat.    Eyes: Negative.    Respiratory: Positive for cough (occasional). Negative for shortness of breath and wheezing.    Cardiovascular: Negative.  Negative for chest pain.   Gastrointestinal: Positive for abdominal pain, nausea and vomiting. Negative for abdominal distention, blood in stool, constipation, diarrhea and rectal pain.   Endocrine: Negative.    Genitourinary: Positive for dysuria, flank pain, frequency and urgency. Negative for difficulty urinating, dyspareunia and pelvic pain.   Musculoskeletal: Positive for myalgias. Negative for back pain.   Skin: Negative for rash.   Neurological: Negative.  Negative for weakness.   Hematological: Negative.  Does not bruise/bleed  easily.   All other systems reviewed and are negative.      Physical Exam     Initial Vitals [05/08/19 1149]   BP Pulse Resp Temp SpO2   103/73 (!) 111 18 98.5 °F (36.9 °C) 98 %      MAP       --         Physical Exam    Nursing note and vitals reviewed.  Constitutional: She appears well-developed and well-nourished.   HENT:   Head: Normocephalic.   Right Ear: External ear normal.   Left Ear: External ear normal.   Mouth/Throat: Oropharynx is clear and moist.   Eyes: Pupils are equal, round, and reactive to light.   Neck: Normal range of motion.   Cardiovascular: Normal rate, regular rhythm and normal heart sounds.   Pulmonary/Chest: Breath sounds normal. She exhibits no tenderness.   Abdominal: Soft. Bowel sounds are normal. She exhibits no distension. There is tenderness. There is no rebound and no guarding.       Musculoskeletal: Normal range of motion.   Neurological: She is alert and oriented to person, place, and time.   Skin: Skin is warm and dry.   Psychiatric: She has a normal mood and affect. Her behavior is normal. Judgment and thought content normal.         ED Course   Procedures  Labs Reviewed   URINALYSIS, REFLEX TO URINE CULTURE - Abnormal; Notable for the following components:       Result Value    Protein, UA 1+ (*)     Ketones, UA 1+ (*)     Occult Blood UA 1+ (*)     Nitrite, UA Positive (*)     Leukocytes, UA 1+ (*)     All other components within normal limits    Narrative:     Preferred Collection Type->Urine, Clean Catch   URINALYSIS MICROSCOPIC - Abnormal; Notable for the following components:    RBC, UA 5 (*)     WBC, UA 16 (*)     Bacteria Many (*)     Non-Squam Epith 1 (*)     Hyaline Casts, UA none seen (*)     All other components within normal limits    Narrative:     Preferred Collection Type->Urine, Clean Catch   CBC W/ AUTO DIFFERENTIAL - Abnormal; Notable for the following components:    Hemoglobin 11.8 (*)     Hematocrit 36.5 (*)     Gran # (ANC) 10.3 (*)     Lymph # 0.9 (*)      Gran% 84.2 (*)     Lymph% 7.3 (*)     All other components within normal limits   COMPREHENSIVE METABOLIC PANEL - Abnormal; Notable for the following components:    Sodium 133 (*)     Potassium 3.1 (*)     CO2 20 (*)     Glucose 115 (*)     Alkaline Phosphatase 43 (*)     All other components within normal limits   INFLUENZA A & B BY MOLECULAR   CULTURE, URINE   PREGNANCY TEST, URINE RAPID          Imaging Results    None          Medical Decision Making:   Initial Assessment:   Patient with complaint of fever, chills, vomiting (x2) while at work today.  She complaints of abdominal pain, flank and low back pain.   She reports painful urination, urgency and frequency x 1 week  No vaginal discharge. No vaginal bleed. No pelvic pain      Differential Diagnosis:   UTI, kidney exam  Influenza  Viral enteritis  dehydration      Pregnancy, ectopic      Clinical Tests:   Lab Tests: Ordered  Radiological Study: Ordered  ED Management:  Urine and influenza obtained. Motrin for fever    Labs and CT ordered    Tylenol for fever    Dr. Bone reviewed labs and CT scan results. CT with no acute findings    Rocephin given for UTI    Patient with nausea and is tolerating PO fluids    Discussed physical exam findings with patient  No acute emergent medical condition identified at this time to warrant further testing/diagnostics.  Plan to discharge patient home with instructions per AVS.  Follow-up with PCP in 2-5 days or return to ED if symptoms worsen.  Patient verbalized agreement to discharge treatment plan.      Other:   I discussed test(s) with the performing physician.                   ED Course as of May 08 1553   Wed May 08, 2019   1500 Patient without nausea. She is tolerating PO fluids    [JL]      ED Course User Index  [JL] Brooklyn Beatty NP     Clinical Impression:       ICD-10-CM ICD-9-CM   1. Urinary tract infection with hematuria, site unspecified N39.0 599.0    R31.9 599.70   2. Fever, unspecified fever cause R50.9  780.60                                Brooklyn Beatty NP  05/08/19 1557

## 2019-05-08 NOTE — ED NOTES
jennifer link phone provided to pt, reports to call her ride home, pt requested phone number for work to be googled, due to her not knowing work number, I google pt's work number per request

## 2019-05-09 NOTE — ED NOTES
Pt here with complaints of fever and generalized weakness. Seen here earlier-dx with UTI. Pt did not get medications filled due to lack of funds. Did not treat fever. Pt awake and alert. Respirations non labored. Skin warm to touch.

## 2019-05-09 NOTE — ED TRIAGE NOTES
Patient seen in ER earlier this evening. Returns with friend relays that she still feels bad/running fever. Patient has no funds for Rx written .

## 2019-05-11 LAB — BACTERIA UR CULT: NORMAL

## 2019-05-14 NOTE — ED PROVIDER NOTES
Encounter Date: 5/8/2019       History     Chief Complaint   Patient presents with    Fever     A 23-year-old female presents complaining of fever slight sore throat urinary frequency for the past 2 days she denies any nausea vomiting diarrhea she has had a history of prior urinary tract infections but has no vaginal discharge        Review of patient's allergies indicates:  No Known Allergies  Past Medical History:   Diagnosis Date    Rubella non-immune status, antepartum      History reviewed. No pertinent surgical history.  Family History   Problem Relation Age of Onset    Hypertension Father     Breast cancer Neg Hx     Colon cancer Neg Hx     Diabetes Neg Hx     Eclampsia Neg Hx     Stroke Neg Hx     Miscarriages / Stillbirths Neg Hx     Ovarian cancer Neg Hx      Social History     Tobacco Use    Smoking status: Never Smoker    Smokeless tobacco: Never Used   Substance Use Topics    Alcohol use: No    Drug use: No     Review of Systems   Constitutional: Negative for fever.   HENT: Negative for sore throat.    Respiratory: Negative for shortness of breath.    Cardiovascular: Negative for chest pain.   Gastrointestinal: Negative for nausea.   Genitourinary: Positive for dysuria and urgency. Negative for vaginal bleeding and vaginal discharge.   Musculoskeletal: Negative for back pain.   Skin: Negative for rash.   Neurological: Negative for weakness.   Hematological: Does not bruise/bleed easily.       Physical Exam     Initial Vitals [05/08/19 2156]   BP Pulse Resp Temp SpO2   109/68 (!) 118 20 (!) 101.6 °F (38.7 °C) 98 %      MAP       --         Physical Exam    Nursing note and vitals reviewed.  Constitutional: She appears well-developed and well-nourished. She is not diaphoretic. No distress.   HENT:   Head: Normocephalic and atraumatic.   Right Ear: External ear normal.   Left Ear: External ear normal.   Injected oropharynx   Eyes: Pupils are equal, round, and reactive to light. Right eye  exhibits no discharge. Left eye exhibits no discharge.   Neck: No tracheal deviation present. No JVD present.   Cardiovascular: Exam reveals no friction rub.    No murmur heard.  Pulmonary/Chest: No stridor. No respiratory distress. She has no wheezes. She has no rales.   Abdominal: Bowel sounds are normal. She exhibits no distension.   Musculoskeletal: Normal range of motion.   Neurological: She is alert.   Skin: Skin is warm.   Psychiatric: She has a normal mood and affect.         ED Course   Procedures  Labs Reviewed - No data to display       Imaging Results    None                               Clinical Impression:       ICD-10-CM ICD-9-CM   1. Acute tracheitis without airway obstruction J04.10 464.10   2. Acute cystitis without hematuria N30.00 595.0                                Rei Branch MD  05/14/19 0546

## 2019-06-28 ENCOUNTER — HOSPITAL ENCOUNTER (EMERGENCY)
Facility: HOSPITAL | Age: 24
Discharge: HOME | End: 2019-06-28
Attending: INTERNAL MEDICINE

## 2019-06-28 VITALS
OXYGEN SATURATION: 100 % | TEMPERATURE: 98 F | HEART RATE: 54 BPM | SYSTOLIC BLOOD PRESSURE: 114 MMHG | DIASTOLIC BLOOD PRESSURE: 69 MMHG | WEIGHT: 121 LBS | RESPIRATION RATE: 16 BRPM | BODY MASS INDEX: 21.43 KG/M2

## 2019-06-28 DIAGNOSIS — A08.11: Primary | ICD-10-CM

## 2019-06-28 LAB
AMYLASE SERPL-CCNC: 43 U/L (ref 20–110)
BASOPHILS # BLD AUTO: 0.06 K/UL (ref 0–0.2)
BASOPHILS NFR BLD: 0.6 % (ref 0–1.9)
DIFFERENTIAL METHOD: ABNORMAL
EOSINOPHIL # BLD AUTO: 0.1 K/UL (ref 0–0.5)
EOSINOPHIL NFR BLD: 1 % (ref 0–8)
ERYTHROCYTE [DISTWIDTH] IN BLOOD BY AUTOMATED COUNT: 13.8 % (ref 11.5–14.5)
HCT VFR BLD AUTO: 40.5 % (ref 37–48.5)
HGB BLD-MCNC: 12.9 G/DL (ref 12–16)
IMM GRANULOCYTES # BLD AUTO: 0.02 K/UL (ref 0–0.04)
IMM GRANULOCYTES NFR BLD AUTO: 0.2 % (ref 0–0.5)
LIPASE SERPL-CCNC: 28 U/L (ref 4–60)
LYMPHOCYTES # BLD AUTO: 1.9 K/UL (ref 1–4.8)
LYMPHOCYTES NFR BLD: 18.2 % (ref 18–48)
MAGNESIUM SERPL-MCNC: 1.8 MG/DL (ref 1.6–2.6)
MCH RBC QN AUTO: 27.7 PG (ref 27–31)
MCHC RBC AUTO-ENTMCNC: 31.9 G/DL (ref 32–36)
MCV RBC AUTO: 87 FL (ref 82–98)
MONOCYTES # BLD AUTO: 0.5 K/UL (ref 0.3–1)
MONOCYTES NFR BLD: 5.2 % (ref 4–15)
NEUTROPHILS # BLD AUTO: 7.7 K/UL (ref 1.8–7.7)
NEUTROPHILS NFR BLD: 74.8 % (ref 38–73)
NRBC BLD-RTO: 0 /100 WBC
PLATELET # BLD AUTO: 253 K/UL (ref 150–350)
PMV BLD AUTO: 10.4 FL (ref 9.2–12.9)
RBC # BLD AUTO: 4.66 M/UL (ref 4–5.4)
WBC # BLD AUTO: 10.26 K/UL (ref 3.9–12.7)

## 2019-06-28 PROCEDURE — 96361 HYDRATE IV INFUSION ADD-ON: CPT

## 2019-06-28 PROCEDURE — 99284 EMERGENCY DEPT VISIT MOD MDM: CPT | Mod: 25

## 2019-06-28 PROCEDURE — 63600175 PHARM REV CODE 636 W HCPCS: Performed by: INTERNAL MEDICINE

## 2019-06-28 PROCEDURE — 96375 TX/PRO/DX INJ NEW DRUG ADDON: CPT

## 2019-06-28 PROCEDURE — 96374 THER/PROPH/DIAG INJ IV PUSH: CPT

## 2019-06-28 PROCEDURE — 83690 ASSAY OF LIPASE: CPT

## 2019-06-28 PROCEDURE — 82150 ASSAY OF AMYLASE: CPT

## 2019-06-28 PROCEDURE — 85025 COMPLETE CBC W/AUTO DIFF WBC: CPT

## 2019-06-28 PROCEDURE — 25000003 PHARM REV CODE 250: Performed by: INTERNAL MEDICINE

## 2019-06-28 PROCEDURE — 83735 ASSAY OF MAGNESIUM: CPT

## 2019-06-28 RX ORDER — ONDANSETRON 2 MG/ML
8 INJECTION INTRAMUSCULAR; INTRAVENOUS
Status: COMPLETED | OUTPATIENT
Start: 2019-06-28 | End: 2019-06-28

## 2019-06-28 RX ORDER — ONDANSETRON 4 MG/1
4 TABLET, FILM COATED ORAL EVERY 6 HOURS
Qty: 12 TABLET | Refills: 0 | Status: SHIPPED | OUTPATIENT
Start: 2019-06-28 | End: 2019-06-28 | Stop reason: SDUPTHER

## 2019-06-28 RX ORDER — HYOSCYAMINE SULFATE 0.12 MG/1
0.12 TABLET SUBLINGUAL EVERY 4 HOURS PRN
Qty: 12 TABLET | Refills: 1 | Status: SHIPPED | OUTPATIENT
Start: 2019-06-28 | End: 2019-06-28 | Stop reason: SDUPTHER

## 2019-06-28 RX ORDER — DEXAMETHASONE SODIUM PHOSPHATE 100 MG/10ML
10 INJECTION INTRAMUSCULAR; INTRAVENOUS
Status: COMPLETED | OUTPATIENT
Start: 2019-06-28 | End: 2019-06-28

## 2019-06-28 RX ORDER — HYOSCYAMINE SULFATE 0.12 MG/1
0.12 TABLET SUBLINGUAL EVERY 4 HOURS PRN
Qty: 12 TABLET | Refills: 1 | Status: SHIPPED | OUTPATIENT
Start: 2019-06-28 | End: 2019-08-11

## 2019-06-28 RX ORDER — ONDANSETRON 4 MG/1
4 TABLET, FILM COATED ORAL EVERY 6 HOURS
Qty: 12 TABLET | Refills: 0 | Status: SHIPPED | OUTPATIENT
Start: 2019-06-28 | End: 2019-08-11

## 2019-06-28 RX ADMIN — ONDANSETRON 8 MG: 2 INJECTION INTRAMUSCULAR; INTRAVENOUS at 09:06

## 2019-06-28 RX ADMIN — DEXAMETHASONE SODIUM PHOSPHATE 10 MG: 10 INJECTION INTRAMUSCULAR; INTRAVENOUS at 09:06

## 2019-06-28 RX ADMIN — SODIUM CHLORIDE 1000 ML: 9 INJECTION, SOLUTION INTRAVENOUS at 09:06

## 2019-06-28 NOTE — ED PROVIDER NOTES
Encounter Date: 6/28/2019       History     Chief Complaint   Patient presents with    Abdominal Pain    Vomiting     Patient started with abdominal pain and nausea 2 days ago.  She works at 1:00 a.m. this morning with severe vomiting.  She has vomited multiple times since then.  She is thirsty.  No other active medical problems.        Review of patient's allergies indicates:  No Known Allergies  Past Medical History:   Diagnosis Date    Rubella non-immune status, antepartum      History reviewed. No pertinent surgical history.  Family History   Problem Relation Age of Onset    Hypertension Father     Breast cancer Neg Hx     Colon cancer Neg Hx     Diabetes Neg Hx     Eclampsia Neg Hx     Stroke Neg Hx     Miscarriages / Stillbirths Neg Hx     Ovarian cancer Neg Hx      Social History     Tobacco Use    Smoking status: Never Smoker    Smokeless tobacco: Never Used   Substance Use Topics    Alcohol use: No    Drug use: No     Review of Systems   Constitutional: Negative for fever.   HENT: Negative for sore throat.    Respiratory: Negative for shortness of breath.    Cardiovascular: Negative for chest pain.   Gastrointestinal: Negative for nausea.   Genitourinary: Negative for dysuria.   Musculoskeletal: Negative for back pain.   Skin: Negative for rash.   Neurological: Negative for weakness.   Hematological: Does not bruise/bleed easily.   All other systems reviewed and are negative.      Physical Exam     Initial Vitals [06/28/19 0833]   BP Pulse Resp Temp SpO2   (!) 128/90 72 16 97.7 °F (36.5 °C) 100 %      MAP       --         Physical Exam    Nursing note and vitals reviewed.  Constitutional: Vital signs are normal. She appears well-developed and well-nourished. She is active and cooperative.   HENT:   Head: Normocephalic and atraumatic.   Eyes: Conjunctivae, EOM and lids are normal. Pupils are equal, round, and reactive to light. Lids are everted and swept, no foreign bodies found.   Neck:  Trachea normal, normal range of motion and full passive range of motion without pain. Neck supple.   Cardiovascular: Normal rate, regular rhythm, S1 normal, S2 normal, normal heart sounds, intact distal pulses and normal pulses.  No extrasystoles are present.    Pulmonary/Chest: Breath sounds normal.   Abdominal: Soft. Normal appearance and bowel sounds are normal.   Musculoskeletal: Normal range of motion.   Neurological: She is alert. She has normal reflexes. GCS eye subscore is 4. GCS verbal subscore is 5. GCS motor subscore is 6.   Skin: Skin is warm, dry and intact. Capillary refill takes less than 2 seconds.   Psychiatric: She has a normal mood and affect. Her speech is normal and behavior is normal. Cognition and memory are normal.         ED Course   Procedures  Labs Reviewed   CBC W/ AUTO DIFFERENTIAL - Abnormal; Notable for the following components:       Result Value    Mean Corpuscular Hemoglobin Conc 31.9 (*)     Gran% 74.8 (*)     All other components within normal limits   LIPASE   AMYLASE   MAGNESIUM          Imaging Results    None          Medical Decision Making:   Clinical Tests:   Lab Tests: Ordered and Reviewed  The following lab test(s) were unremarkable: CBC and CMP       <> Summary of Lab: Lab studies unremarkable  ED Management:  Patient was given 1 L of intravenous normal saline, Zofran, and Decadron pop.  Her nausea vomiting resolved she felt much improved.  She is have good hygiene for total 5 days after which she should not be contagious.  No work until Monday.                      Clinical Impression:       ICD-10-CM ICD-9-CM   1. Viral gastroenteritis due to [place]-like agent A08.11 008.63         Disposition:   Disposition: Discharged  Condition: Stable                        Doroteo Bone MD  06/28/19 0396

## 2019-08-11 ENCOUNTER — HOSPITAL ENCOUNTER (EMERGENCY)
Facility: HOSPITAL | Age: 24
Discharge: HOME | End: 2019-08-11
Attending: INTERNAL MEDICINE

## 2019-08-11 VITALS
OXYGEN SATURATION: 100 % | WEIGHT: 121 LBS | BODY MASS INDEX: 21.44 KG/M2 | HEART RATE: 74 BPM | DIASTOLIC BLOOD PRESSURE: 60 MMHG | HEIGHT: 63 IN | SYSTOLIC BLOOD PRESSURE: 100 MMHG | RESPIRATION RATE: 12 BRPM | TEMPERATURE: 98 F

## 2019-08-11 DIAGNOSIS — R42 DIZZINESS: ICD-10-CM

## 2019-08-11 DIAGNOSIS — R55 VASOVAGAL SYNCOPE: Primary | ICD-10-CM

## 2019-08-11 LAB
ALBUMIN SERPL BCP-MCNC: 4.2 G/DL (ref 3.5–5.2)
ALP SERPL-CCNC: 45 U/L (ref 55–135)
ALT SERPL W/O P-5'-P-CCNC: 9 U/L (ref 10–44)
AMPHET+METHAMPHET UR QL: NEGATIVE
ANION GAP SERPL CALC-SCNC: 8 MMOL/L (ref 8–16)
APAP SERPL-MCNC: <10 UG/ML (ref 10–20)
APTT BLDCRRT: 25.7 SEC (ref 21–32)
AST SERPL-CCNC: 15 U/L (ref 10–40)
B-HCG UR QL: NEGATIVE
BASOPHILS # BLD AUTO: 0.09 K/UL (ref 0–0.2)
BASOPHILS NFR BLD: 0.9 % (ref 0–1.9)
BENZODIAZ UR QL SCN>200 NG/ML: NEGATIVE
BILIRUB SERPL-MCNC: 0.3 MG/DL (ref 0.1–1)
BILIRUB UR QL STRIP: NEGATIVE
BUN SERPL-MCNC: 11 MG/DL (ref 6–20)
BZE UR QL SCN: NEGATIVE
CALCIUM SERPL-MCNC: 8.8 MG/DL (ref 8.7–10.5)
CANNABINOIDS UR QL SCN: NORMAL
CHLORIDE SERPL-SCNC: 108 MMOL/L (ref 95–110)
CLARITY UR: CLEAR
CO2 SERPL-SCNC: 25 MMOL/L (ref 23–29)
COLOR UR: YELLOW
CREAT SERPL-MCNC: 0.8 MG/DL (ref 0.5–1.4)
CREAT UR-MCNC: 156.1 MG/DL (ref 15–325)
DIFFERENTIAL METHOD: ABNORMAL
EOSINOPHIL # BLD AUTO: 0.2 K/UL (ref 0–0.5)
EOSINOPHIL NFR BLD: 2.3 % (ref 0–8)
ERYTHROCYTE [DISTWIDTH] IN BLOOD BY AUTOMATED COUNT: 13.2 % (ref 11.5–14.5)
EST. GFR  (AFRICAN AMERICAN): >60 ML/MIN/1.73 M^2
EST. GFR  (NON AFRICAN AMERICAN): >60 ML/MIN/1.73 M^2
ETHANOL SERPL-MCNC: <5 MG/DL
GLUCOSE SERPL-MCNC: 75 MG/DL (ref 70–110)
GLUCOSE UR QL STRIP: NEGATIVE
HCT VFR BLD AUTO: 37.2 % (ref 37–48.5)
HGB BLD-MCNC: 11.8 G/DL (ref 12–16)
HGB UR QL STRIP: NEGATIVE
IMM GRANULOCYTES # BLD AUTO: 0.02 K/UL (ref 0–0.04)
IMM GRANULOCYTES NFR BLD AUTO: 0.2 % (ref 0–0.5)
INR PPP: 1.1 (ref 0.8–1.2)
KETONES UR QL STRIP: NEGATIVE
LACTATE SERPL-SCNC: 0.9 MMOL/L (ref 0.5–2.2)
LEUKOCYTE ESTERASE UR QL STRIP: NEGATIVE
LYMPHOCYTES # BLD AUTO: 3.8 K/UL (ref 1–4.8)
LYMPHOCYTES NFR BLD: 37.1 % (ref 18–48)
MCH RBC QN AUTO: 27.9 PG (ref 27–31)
MCHC RBC AUTO-ENTMCNC: 31.7 G/DL (ref 32–36)
MCV RBC AUTO: 88 FL (ref 82–98)
MONOCYTES # BLD AUTO: 0.6 K/UL (ref 0.3–1)
MONOCYTES NFR BLD: 5.6 % (ref 4–15)
NEUTROPHILS # BLD AUTO: 5.6 K/UL (ref 1.8–7.7)
NEUTROPHILS NFR BLD: 53.9 % (ref 38–73)
NITRITE UR QL STRIP: NEGATIVE
NRBC BLD-RTO: 0 /100 WBC
OPIATES UR QL SCN: NEGATIVE
PCP UR QL SCN>25 NG/ML: NEGATIVE
PH UR STRIP: 8 [PH] (ref 5–8)
PLATELET # BLD AUTO: 266 K/UL (ref 150–350)
PMV BLD AUTO: 10.5 FL (ref 9.2–12.9)
POTASSIUM SERPL-SCNC: 3.9 MMOL/L (ref 3.5–5.1)
PROT SERPL-MCNC: 6.7 G/DL (ref 6–8.4)
PROT UR QL STRIP: NEGATIVE
PROTHROMBIN TIME: 12.1 SEC (ref 9–12.5)
RBC # BLD AUTO: 4.23 M/UL (ref 4–5.4)
SODIUM SERPL-SCNC: 141 MMOL/L (ref 136–145)
SP GR UR STRIP: 1.02 (ref 1–1.03)
TOXICOLOGY INFORMATION: NORMAL
TROPONIN I SERPL DL<=0.01 NG/ML-MCNC: <0.01 NG/ML (ref 0.02–0.5)
TSH SERPL DL<=0.005 MIU/L-ACNC: 2.44 UIU/ML (ref 0.34–5.6)
URN SPEC COLLECT METH UR: NORMAL
UROBILINOGEN UR STRIP-ACNC: NEGATIVE EU/DL
WBC # BLD AUTO: 10.33 K/UL (ref 3.9–12.7)

## 2019-08-11 PROCEDURE — 71045 X-RAY EXAM CHEST 1 VIEW: CPT | Mod: TC,FY

## 2019-08-11 PROCEDURE — 85730 THROMBOPLASTIN TIME PARTIAL: CPT

## 2019-08-11 PROCEDURE — 93005 ELECTROCARDIOGRAM TRACING: CPT

## 2019-08-11 PROCEDURE — 99285 EMERGENCY DEPT VISIT HI MDM: CPT | Mod: 25

## 2019-08-11 PROCEDURE — 83605 ASSAY OF LACTIC ACID: CPT

## 2019-08-11 PROCEDURE — 63600175 PHARM REV CODE 636 W HCPCS: Performed by: INTERNAL MEDICINE

## 2019-08-11 PROCEDURE — 84484 ASSAY OF TROPONIN QUANT: CPT

## 2019-08-11 PROCEDURE — 80307 DRUG TEST PRSMV CHEM ANLYZR: CPT

## 2019-08-11 PROCEDURE — 85610 PROTHROMBIN TIME: CPT

## 2019-08-11 PROCEDURE — 96374 THER/PROPH/DIAG INJ IV PUSH: CPT

## 2019-08-11 PROCEDURE — 81025 URINE PREGNANCY TEST: CPT

## 2019-08-11 PROCEDURE — 70450 CT HEAD WITHOUT CONTRAST: ICD-10-PCS | Mod: 26,,, | Performed by: RADIOLOGY

## 2019-08-11 PROCEDURE — 84443 ASSAY THYROID STIM HORMONE: CPT

## 2019-08-11 PROCEDURE — 80329 ANALGESICS NON-OPIOID 1 OR 2: CPT

## 2019-08-11 PROCEDURE — 70450 CT HEAD/BRAIN W/O DYE: CPT | Mod: TC

## 2019-08-11 PROCEDURE — 80320 DRUG SCREEN QUANTALCOHOLS: CPT

## 2019-08-11 PROCEDURE — 81003 URINALYSIS AUTO W/O SCOPE: CPT | Mod: 59

## 2019-08-11 PROCEDURE — 96361 HYDRATE IV INFUSION ADD-ON: CPT

## 2019-08-11 PROCEDURE — 71045 X-RAY EXAM CHEST 1 VIEW: CPT | Mod: 26,,, | Performed by: RADIOLOGY

## 2019-08-11 PROCEDURE — 80053 COMPREHEN METABOLIC PANEL: CPT

## 2019-08-11 PROCEDURE — 85025 COMPLETE CBC W/AUTO DIFF WBC: CPT

## 2019-08-11 PROCEDURE — 71045 XR CHEST AP PORTABLE: ICD-10-PCS | Mod: 26,,, | Performed by: RADIOLOGY

## 2019-08-11 PROCEDURE — 70450 CT HEAD/BRAIN W/O DYE: CPT | Mod: 26,,, | Performed by: RADIOLOGY

## 2019-08-11 RX ORDER — ONDANSETRON 2 MG/ML
8 INJECTION INTRAMUSCULAR; INTRAVENOUS
Status: COMPLETED | OUTPATIENT
Start: 2019-08-11 | End: 2019-08-11

## 2019-08-11 RX ADMIN — SODIUM CHLORIDE 1000 ML: 0.9 INJECTION, SOLUTION INTRAVENOUS at 07:08

## 2019-08-11 RX ADMIN — ONDANSETRON 8 MG: 2 INJECTION INTRAMUSCULAR; INTRAVENOUS at 07:08
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
## 2019-08-12 NOTE — DISCHARGE INSTRUCTIONS
Follow-up with primary care physician of no improvement or symptoms return  Continue any medications already prescribed

## 2020-04-20 ENCOUNTER — HOSPITAL ENCOUNTER (EMERGENCY)
Facility: HOSPITAL | Age: 25
Discharge: HOME | End: 2020-04-20
Attending: FAMILY MEDICINE

## 2020-04-20 VITALS
OXYGEN SATURATION: 97 % | SYSTOLIC BLOOD PRESSURE: 109 MMHG | BODY MASS INDEX: 19.49 KG/M2 | RESPIRATION RATE: 16 BRPM | HEIGHT: 63 IN | TEMPERATURE: 99 F | WEIGHT: 110 LBS | HEART RATE: 93 BPM | DIASTOLIC BLOOD PRESSURE: 66 MMHG
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
## 2020-04-20 DIAGNOSIS — Z3A.01 LESS THAN 8 WEEKS GESTATION OF PREGNANCY: ICD-10-CM

## 2020-04-20 PROCEDURE — 99283 EMERGENCY DEPT VISIT LOW MDM: CPT

## 2020-04-20 PROCEDURE — 25000003 PHARM REV CODE 250: Performed by: FAMILY MEDICINE

## 2020-04-20 RX ORDER — ACETAMINOPHEN 500 MG
1000 TABLET ORAL
Status: COMPLETED | OUTPATIENT
Start: 2020-04-20 | End: 2020-04-20

## 2020-04-20 RX ADMIN — ACETAMINOPHEN 1000 MG: 500 TABLET, FILM COATED ORAL at 01:04
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
## 2021-01-03 ENCOUNTER — HOSPITAL ENCOUNTER (EMERGENCY)
Facility: HOSPITAL | Age: 26
Discharge: HOME | End: 2021-01-03
Attending: EMERGENCY MEDICINE
Payer: MEDICAID

## 2021-01-03 VITALS
OXYGEN SATURATION: 98 % | DIASTOLIC BLOOD PRESSURE: 56 MMHG | WEIGHT: 123 LBS | SYSTOLIC BLOOD PRESSURE: 103 MMHG | HEART RATE: 93 BPM | HEIGHT: 63 IN | BODY MASS INDEX: 21.79 KG/M2 | TEMPERATURE: 98 F | RESPIRATION RATE: 20 BRPM

## 2021-01-03 DIAGNOSIS — Z34.90 PREGNANCY AT EARLY STAGE: ICD-10-CM

## 2021-01-03 DIAGNOSIS — O20.0 THREATENED ABORTION: Primary | ICD-10-CM

## 2021-01-03 DIAGNOSIS — R11.2 NON-INTRACTABLE VOMITING WITH NAUSEA, UNSPECIFIED VOMITING TYPE: ICD-10-CM

## 2021-01-03 LAB
ABO + RH BLD: NORMAL
ALBUMIN SERPL BCP-MCNC: 4.4 G/DL (ref 3.5–5.2)
ALP SERPL-CCNC: 45 U/L (ref 55–135)
ALT SERPL W/O P-5'-P-CCNC: 9 U/L (ref 10–44)
ANION GAP SERPL CALC-SCNC: 10 MMOL/L (ref 8–16)
AST SERPL-CCNC: 18 U/L (ref 10–40)
B-HCG UR QL: POSITIVE
BASOPHILS # BLD AUTO: 0.08 K/UL (ref 0–0.2)
BASOPHILS NFR BLD: 0.4 % (ref 0–1.9)
BILIRUB SERPL-MCNC: 0.9 MG/DL (ref 0.1–1)
BILIRUB UR QL STRIP: NEGATIVE
BUN SERPL-MCNC: 8 MG/DL (ref 6–20)
CALCIUM SERPL-MCNC: 9.2 MG/DL (ref 8.7–10.5)
CHLORIDE SERPL-SCNC: 103 MMOL/L (ref 95–110)
CLARITY UR: CLEAR
CO2 SERPL-SCNC: 21 MMOL/L (ref 23–29)
COLOR UR: YELLOW
CREAT SERPL-MCNC: 0.5 MG/DL (ref 0.5–1.4)
DIFFERENTIAL METHOD: ABNORMAL
EOSINOPHIL # BLD AUTO: 0.1 K/UL (ref 0–0.5)
EOSINOPHIL NFR BLD: 0.6 % (ref 0–8)
ERYTHROCYTE [DISTWIDTH] IN BLOOD BY AUTOMATED COUNT: 12.4 % (ref 11.5–14.5)
EST. GFR  (AFRICAN AMERICAN): >60 ML/MIN/1.73 M^2
EST. GFR  (NON AFRICAN AMERICAN): >60 ML/MIN/1.73 M^2
GLUCOSE SERPL-MCNC: 88 MG/DL (ref 70–110)
GLUCOSE UR QL STRIP: NEGATIVE
GROUP A STREP, MOLECULAR: NEGATIVE
HCG INTACT+B SERPL-ACNC: NORMAL MIU/ML
HCT VFR BLD AUTO: 38.4 % (ref 37–48.5)
HGB BLD-MCNC: 12.5 G/DL (ref 12–16)
HGB UR QL STRIP: ABNORMAL
IMM GRANULOCYTES # BLD AUTO: 0.06 K/UL (ref 0–0.04)
IMM GRANULOCYTES NFR BLD AUTO: 0.3 % (ref 0–0.5)
KETONES UR QL STRIP: ABNORMAL
LEUKOCYTE ESTERASE UR QL STRIP: NEGATIVE
LIPASE SERPL-CCNC: 22 U/L (ref 4–60)
LYMPHOCYTES # BLD AUTO: 0.9 K/UL (ref 1–4.8)
LYMPHOCYTES NFR BLD: 4.6 % (ref 18–48)
MCH RBC QN AUTO: 28.7 PG (ref 27–31)
MCHC RBC AUTO-ENTMCNC: 32.6 G/DL (ref 32–36)
MCV RBC AUTO: 88 FL (ref 82–98)
MONOCYTES # BLD AUTO: 1 K/UL (ref 0.3–1)
MONOCYTES NFR BLD: 4.9 % (ref 4–15)
NEUTROPHILS # BLD AUTO: 17.9 K/UL (ref 1.8–7.7)
NEUTROPHILS NFR BLD: 89.2 % (ref 38–73)
NITRITE UR QL STRIP: NEGATIVE
NRBC BLD-RTO: 0 /100 WBC
PH UR STRIP: 8 [PH] (ref 5–8)
PLATELET # BLD AUTO: 199 K/UL (ref 150–350)
PMV BLD AUTO: 11 FL (ref 9.2–12.9)
POTASSIUM SERPL-SCNC: 3.7 MMOL/L (ref 3.5–5.1)
PROT SERPL-MCNC: 7.2 G/DL (ref 6–8.4)
PROT UR QL STRIP: NEGATIVE
RBC # BLD AUTO: 4.35 M/UL (ref 4–5.4)
SODIUM SERPL-SCNC: 134 MMOL/L (ref 136–145)
SP GR UR STRIP: 1.02 (ref 1–1.03)
URN SPEC COLLECT METH UR: ABNORMAL
UROBILINOGEN UR STRIP-ACNC: NEGATIVE EU/DL
WBC # BLD AUTO: 20.05 K/UL (ref 3.9–12.7)

## 2021-01-03 PROCEDURE — 76817 TRANSVAGINAL US OBSTETRIC: CPT | Mod: 26,,, | Performed by: RADIOLOGY

## 2021-01-03 PROCEDURE — 99284 EMERGENCY DEPT VISIT MOD MDM: CPT | Mod: 25

## 2021-01-03 PROCEDURE — 96374 THER/PROPH/DIAG INJ IV PUSH: CPT

## 2021-01-03 PROCEDURE — 84702 CHORIONIC GONADOTROPIN TEST: CPT

## 2021-01-03 PROCEDURE — 87651 STREP A DNA AMP PROBE: CPT

## 2021-01-03 PROCEDURE — 86900 BLOOD TYPING SEROLOGIC ABO: CPT

## 2021-01-03 PROCEDURE — 76801 US OB <14 WEEKS, TRANSABDOM & TRANSVAG, SINGLE GESTATION (XPD): ICD-10-PCS | Mod: 26,,, | Performed by: RADIOLOGY

## 2021-01-03 PROCEDURE — 80053 COMPREHEN METABOLIC PANEL: CPT

## 2021-01-03 PROCEDURE — 81025 URINE PREGNANCY TEST: CPT

## 2021-01-03 PROCEDURE — 63600175 PHARM REV CODE 636 W HCPCS: Performed by: EMERGENCY MEDICINE

## 2021-01-03 PROCEDURE — 76801 OB US < 14 WKS SINGLE FETUS: CPT | Mod: 26,,, | Performed by: RADIOLOGY

## 2021-01-03 PROCEDURE — 76817 US OB <14 WEEKS, TRANSABDOM & TRANSVAG, SINGLE GESTATION (XPD): ICD-10-PCS | Mod: 26,,, | Performed by: RADIOLOGY

## 2021-01-03 PROCEDURE — 76817 TRANSVAGINAL US OBSTETRIC: CPT | Mod: TC

## 2021-01-03 PROCEDURE — 81003 URINALYSIS AUTO W/O SCOPE: CPT

## 2021-01-03 PROCEDURE — 96361 HYDRATE IV INFUSION ADD-ON: CPT

## 2021-01-03 PROCEDURE — 83690 ASSAY OF LIPASE: CPT

## 2021-01-03 PROCEDURE — 85025 COMPLETE CBC W/AUTO DIFF WBC: CPT

## 2021-01-03 PROCEDURE — 25000003 PHARM REV CODE 250: Performed by: EMERGENCY MEDICINE

## 2021-01-03 PROCEDURE — 36415 COLL VENOUS BLD VENIPUNCTURE: CPT

## 2021-01-03 RX ORDER — FAMOTIDINE 20 MG
1 TABLET ORAL DAILY
Qty: 30 TABLET | Refills: 3 | Status: SHIPPED | OUTPATIENT
Start: 2021-01-03 | End: 2021-07-28 | Stop reason: CLARIF

## 2021-01-03 RX ORDER — DOXYLAMINE SUCCINATE AND PYRIDOXINE HYDROCHLORIDE, DELAYED RELEASE TABLETS 10 MG/10 MG 10; 10 MG/1; MG/1
2 TABLET, DELAYED RELEASE ORAL NIGHTLY
Qty: 20 TABLET | Refills: 1 | Status: SHIPPED | OUTPATIENT
Start: 2021-01-03 | End: 2021-07-28 | Stop reason: CLARIF

## 2021-01-03 RX ORDER — ONDANSETRON 2 MG/ML
4 INJECTION INTRAMUSCULAR; INTRAVENOUS
Status: COMPLETED | OUTPATIENT
Start: 2021-01-03 | End: 2021-01-03

## 2021-01-03 RX ADMIN — SODIUM CHLORIDE 1000 ML: 0.9 INJECTION, SOLUTION INTRAVENOUS at 12:01

## 2021-01-03 RX ADMIN — SODIUM CHLORIDE 1000 ML: 0.9 INJECTION, SOLUTION INTRAVENOUS at 02:01

## 2021-01-03 RX ADMIN — ONDANSETRON 4 MG: 2 INJECTION INTRAMUSCULAR; INTRAVENOUS at 12:01

## 2021-01-04 ENCOUNTER — HOSPITAL ENCOUNTER (EMERGENCY)
Facility: HOSPITAL | Age: 26
Discharge: HOME | End: 2021-01-04
Attending: FAMILY MEDICINE
Payer: MEDICAID

## 2021-01-04 VITALS
RESPIRATION RATE: 20 BRPM | HEART RATE: 105 BPM | HEIGHT: 63 IN | BODY MASS INDEX: 21.79 KG/M2 | SYSTOLIC BLOOD PRESSURE: 127 MMHG | TEMPERATURE: 97 F | OXYGEN SATURATION: 99 % | WEIGHT: 123 LBS | DIASTOLIC BLOOD PRESSURE: 70 MMHG

## 2021-01-04 DIAGNOSIS — R58 BLEEDING: ICD-10-CM

## 2021-01-04 DIAGNOSIS — N93.9 VAGINAL BLEEDING: Primary | ICD-10-CM

## 2021-01-04 DIAGNOSIS — O20.9 VAGINAL BLEEDING IN PREGNANCY, FIRST TRIMESTER: ICD-10-CM

## 2021-01-04 DIAGNOSIS — Z34.91 NORMAL IUP (INTRAUTERINE PREGNANCY) ON PRENATAL ULTRASOUND, FIRST TRIMESTER: ICD-10-CM

## 2021-01-04 LAB — HCG INTACT+B SERPL-ACNC: NORMAL MIU/ML

## 2021-01-04 PROCEDURE — 99282 EMERGENCY DEPT VISIT SF MDM: CPT

## 2021-01-04 PROCEDURE — 36415 COLL VENOUS BLD VENIPUNCTURE: CPT

## 2021-01-04 PROCEDURE — 84702 CHORIONIC GONADOTROPIN TEST: CPT

## 2021-03-19 ENCOUNTER — HOSPITAL ENCOUNTER (EMERGENCY)
Facility: HOSPITAL | Age: 26
Discharge: HOME | End: 2021-03-19
Attending: INTERNAL MEDICINE
Payer: MEDICAID

## 2021-03-19 VITALS
SYSTOLIC BLOOD PRESSURE: 126 MMHG | DIASTOLIC BLOOD PRESSURE: 80 MMHG | HEART RATE: 106 BPM | BODY MASS INDEX: 21.26 KG/M2 | OXYGEN SATURATION: 100 % | RESPIRATION RATE: 22 BRPM | WEIGHT: 120 LBS | HEIGHT: 63 IN | TEMPERATURE: 99 F

## 2021-03-19 DIAGNOSIS — N12 PYELONEPHRITIS: Primary | ICD-10-CM

## 2021-03-19 DIAGNOSIS — J18.9 PNEUMONIA: ICD-10-CM

## 2021-03-19 LAB
ALBUMIN SERPL BCP-MCNC: 4 G/DL (ref 3.5–5.2)
ALP SERPL-CCNC: 56 U/L (ref 55–135)
ALT SERPL W/O P-5'-P-CCNC: 22 U/L (ref 10–44)
ANION GAP SERPL CALC-SCNC: 9 MMOL/L (ref 8–16)
AST SERPL-CCNC: 30 U/L (ref 10–40)
BACTERIA #/AREA URNS HPF: 3 /HPF
BASOPHILS # BLD AUTO: 0.08 K/UL (ref 0–0.2)
BASOPHILS NFR BLD: 0.7 % (ref 0–1.9)
BILIRUB SERPL-MCNC: 0.7 MG/DL (ref 0.1–1)
BILIRUB UR QL STRIP: NEGATIVE
BUN SERPL-MCNC: 7 MG/DL (ref 6–20)
CALCIUM SERPL-MCNC: 8.9 MG/DL (ref 8.7–10.5)
CHLORIDE SERPL-SCNC: 104 MMOL/L (ref 95–110)
CLARITY UR: ABNORMAL
CO2 SERPL-SCNC: 25 MMOL/L (ref 23–29)
COLOR UR: YELLOW
CREAT SERPL-MCNC: 0.6 MG/DL (ref 0.5–1.4)
CRP SERPL-MCNC: 7.18 MG/DL (ref 0–0.75)
D DIMER PPP IA.FEU-MCNC: 2.07 MG/L FEU
DIFFERENTIAL METHOD: ABNORMAL
EOSINOPHIL # BLD AUTO: 0.3 K/UL (ref 0–0.5)
EOSINOPHIL NFR BLD: 2.8 % (ref 0–8)
ERYTHROCYTE [DISTWIDTH] IN BLOOD BY AUTOMATED COUNT: 12.5 % (ref 11.5–14.5)
EST. GFR  (AFRICAN AMERICAN): >60 ML/MIN/1.73 M^2
EST. GFR  (NON AFRICAN AMERICAN): >60 ML/MIN/1.73 M^2
GLUCOSE SERPL-MCNC: 93 MG/DL (ref 70–110)
GLUCOSE UR QL STRIP: NEGATIVE
HCT VFR BLD AUTO: 41.1 % (ref 37–48.5)
HGB BLD-MCNC: 13.2 G/DL (ref 12–16)
HGB UR QL STRIP: ABNORMAL
IMM GRANULOCYTES # BLD AUTO: 0.05 K/UL (ref 0–0.04)
IMM GRANULOCYTES NFR BLD AUTO: 0.4 % (ref 0–0.5)
KETONES UR QL STRIP: NEGATIVE
LEUKOCYTE ESTERASE UR QL STRIP: ABNORMAL
LYMPHOCYTES # BLD AUTO: 1.4 K/UL (ref 1–4.8)
LYMPHOCYTES NFR BLD: 12.4 % (ref 18–48)
MCH RBC QN AUTO: 29.1 PG (ref 27–31)
MCHC RBC AUTO-ENTMCNC: 32.1 G/DL (ref 32–36)
MCV RBC AUTO: 91 FL (ref 82–98)
MICROSCOPIC COMMENT: ABNORMAL
MONOCYTES # BLD AUTO: 0.8 K/UL (ref 0.3–1)
MONOCYTES NFR BLD: 7.3 % (ref 4–15)
NEUTROPHILS # BLD AUTO: 8.5 K/UL (ref 1.8–7.7)
NEUTROPHILS NFR BLD: 76.4 % (ref 38–73)
NITRITE UR QL STRIP: POSITIVE
NRBC BLD-RTO: 0 /100 WBC
PH UR STRIP: 6 [PH] (ref 5–8)
PLATELET # BLD AUTO: 242 K/UL (ref 150–350)
PMV BLD AUTO: 10.3 FL (ref 9.2–12.9)
POTASSIUM SERPL-SCNC: 3.9 MMOL/L (ref 3.5–5.1)
PROT SERPL-MCNC: 7.4 G/DL (ref 6–8.4)
PROT UR QL STRIP: ABNORMAL
RBC # BLD AUTO: 4.53 M/UL (ref 4–5.4)
RBC #/AREA URNS HPF: 10 /HPF (ref 0–4)
SARS-COV-2 RDRP RESP QL NAA+PROBE: NEGATIVE
SODIUM SERPL-SCNC: 138 MMOL/L (ref 136–145)
SP GR UR STRIP: 1.02 (ref 1–1.03)
URN SPEC COLLECT METH UR: ABNORMAL
UROBILINOGEN UR STRIP-ACNC: NEGATIVE EU/DL
WBC # BLD AUTO: 11.17 K/UL (ref 3.9–12.7)
WBC #/AREA URNS HPF: 75 /HPF (ref 0–5)

## 2021-03-19 PROCEDURE — 25000003 PHARM REV CODE 250: Performed by: INTERNAL MEDICINE

## 2021-03-19 PROCEDURE — 71046 X-RAY EXAM CHEST 2 VIEWS: CPT | Mod: 26,,, | Performed by: RADIOLOGY

## 2021-03-19 PROCEDURE — 81000 URINALYSIS NONAUTO W/SCOPE: CPT | Performed by: INTERNAL MEDICINE

## 2021-03-19 PROCEDURE — 96365 THER/PROPH/DIAG IV INF INIT: CPT

## 2021-03-19 PROCEDURE — 87086 URINE CULTURE/COLONY COUNT: CPT | Performed by: INTERNAL MEDICINE

## 2021-03-19 PROCEDURE — 85379 FIBRIN DEGRADATION QUANT: CPT | Performed by: INTERNAL MEDICINE

## 2021-03-19 PROCEDURE — 80053 COMPREHEN METABOLIC PANEL: CPT | Performed by: INTERNAL MEDICINE

## 2021-03-19 PROCEDURE — 99284 EMERGENCY DEPT VISIT MOD MDM: CPT | Mod: 25

## 2021-03-19 PROCEDURE — U0002 COVID-19 LAB TEST NON-CDC: HCPCS | Performed by: INTERNAL MEDICINE

## 2021-03-19 PROCEDURE — 63600175 PHARM REV CODE 636 W HCPCS: Performed by: INTERNAL MEDICINE

## 2021-03-19 PROCEDURE — 87088 URINE BACTERIA CULTURE: CPT | Performed by: INTERNAL MEDICINE

## 2021-03-19 PROCEDURE — 71046 X-RAY EXAM CHEST 2 VIEWS: CPT | Mod: TC,FY

## 2021-03-19 PROCEDURE — 71046 XR CHEST PA AND LATERAL: ICD-10-PCS | Mod: 26,,, | Performed by: RADIOLOGY

## 2021-03-19 PROCEDURE — 87077 CULTURE AEROBIC IDENTIFY: CPT | Performed by: INTERNAL MEDICINE

## 2021-03-19 PROCEDURE — 85025 COMPLETE CBC W/AUTO DIFF WBC: CPT | Performed by: INTERNAL MEDICINE

## 2021-03-19 PROCEDURE — 87186 SC STD MICRODIL/AGAR DIL: CPT | Performed by: INTERNAL MEDICINE

## 2021-03-19 PROCEDURE — 86140 C-REACTIVE PROTEIN: CPT | Performed by: INTERNAL MEDICINE

## 2021-03-19 RX ORDER — CIPROFLOXACIN 250 MG/1
500 TABLET, FILM COATED ORAL
Status: COMPLETED | OUTPATIENT
Start: 2021-03-19 | End: 2021-03-19

## 2021-03-19 RX ORDER — HYDROCODONE BITARTRATE AND ACETAMINOPHEN 5; 325 MG/1; MG/1
1 TABLET ORAL
Status: COMPLETED | OUTPATIENT
Start: 2021-03-19 | End: 2021-03-19

## 2021-03-19 RX ORDER — CIPROFLOXACIN 500 MG/1
500 TABLET ORAL EVERY 8 HOURS
Qty: 30 TABLET | Refills: 2 | Status: SHIPPED | OUTPATIENT
Start: 2021-03-19 | End: 2021-07-28 | Stop reason: CLARIF

## 2021-03-19 RX ORDER — HYDROCODONE BITARTRATE AND ACETAMINOPHEN 5; 325 MG/1; MG/1
1 TABLET ORAL EVERY 4 HOURS PRN
Qty: 6 TABLET | Refills: 0 | OUTPATIENT
Start: 2021-03-19 | End: 2021-03-24

## 2021-03-19 RX ADMIN — HYDROCODONE BITARTRATE AND ACETAMINOPHEN 1 TABLET: 5; 325 TABLET ORAL at 11:03

## 2021-03-19 RX ADMIN — CIPROFLOXACIN 500 MG: 250 TABLET ORAL at 10:03

## 2021-03-19 RX ADMIN — CEFTRIAXONE 2 G: 2 INJECTION, SOLUTION INTRAVENOUS at 09:03

## 2021-03-21 LAB — BACTERIA UR CULT: ABNORMAL

## 2021-03-23 ENCOUNTER — HOSPITAL ENCOUNTER (EMERGENCY)
Facility: HOSPITAL | Age: 26
Discharge: HOME | End: 2021-03-24
Attending: EMERGENCY MEDICINE
Payer: MEDICAID

## 2021-03-23 DIAGNOSIS — R30.0 DYSURIA: Primary | ICD-10-CM

## 2021-03-23 LAB
ALBUMIN SERPL BCP-MCNC: 3.3 G/DL (ref 3.5–5.2)
ALP SERPL-CCNC: 77 U/L (ref 55–135)
ALT SERPL W/O P-5'-P-CCNC: 65 U/L (ref 10–44)
ANION GAP SERPL CALC-SCNC: 8 MMOL/L (ref 8–16)
AST SERPL-CCNC: 57 U/L (ref 10–40)
B-HCG UR QL: NEGATIVE
BASOPHILS # BLD AUTO: 0.07 K/UL (ref 0–0.2)
BASOPHILS NFR BLD: 0.6 % (ref 0–1.9)
BILIRUB SERPL-MCNC: 0.7 MG/DL (ref 0.1–1)
BILIRUB UR QL STRIP: NEGATIVE
BUN SERPL-MCNC: 11 MG/DL (ref 6–20)
CALCIUM SERPL-MCNC: 8.9 MG/DL (ref 8.7–10.5)
CHLORIDE SERPL-SCNC: 103 MMOL/L (ref 95–110)
CLARITY UR: CLEAR
CO2 SERPL-SCNC: 26 MMOL/L (ref 23–29)
COLOR UR: YELLOW
CREAT SERPL-MCNC: 0.5 MG/DL (ref 0.5–1.4)
CTP QC/QA: YES
D DIMER PPP IA.FEU-MCNC: 0.72 UG/ML FEU
DIFFERENTIAL METHOD: ABNORMAL
EOSINOPHIL # BLD AUTO: 0.6 K/UL (ref 0–0.5)
EOSINOPHIL NFR BLD: 5.6 % (ref 0–8)
ERYTHROCYTE [DISTWIDTH] IN BLOOD BY AUTOMATED COUNT: 12.7 % (ref 11.5–14.5)
EST. GFR  (AFRICAN AMERICAN): >60 ML/MIN/1.73 M^2
EST. GFR  (NON AFRICAN AMERICAN): >60 ML/MIN/1.73 M^2
GLUCOSE SERPL-MCNC: 97 MG/DL (ref 70–110)
GLUCOSE UR QL STRIP: NEGATIVE
HCT VFR BLD AUTO: 34 % (ref 37–48.5)
HGB BLD-MCNC: 10.9 G/DL (ref 12–16)
HGB UR QL STRIP: NEGATIVE
IMM GRANULOCYTES # BLD AUTO: 0.04 K/UL (ref 0–0.04)
IMM GRANULOCYTES NFR BLD AUTO: 0.4 % (ref 0–0.5)
KETONES UR QL STRIP: NEGATIVE
LEUKOCYTE ESTERASE UR QL STRIP: NEGATIVE
LYMPHOCYTES # BLD AUTO: 2.7 K/UL (ref 1–4.8)
LYMPHOCYTES NFR BLD: 24.4 % (ref 18–48)
MCH RBC QN AUTO: 29.2 PG (ref 27–31)
MCHC RBC AUTO-ENTMCNC: 32.1 G/DL (ref 32–36)
MCV RBC AUTO: 91 FL (ref 82–98)
MONOCYTES # BLD AUTO: 0.9 K/UL (ref 0.3–1)
MONOCYTES NFR BLD: 7.8 % (ref 4–15)
NEUTROPHILS # BLD AUTO: 6.7 K/UL (ref 1.8–7.7)
NEUTROPHILS NFR BLD: 61.2 % (ref 38–73)
NITRITE UR QL STRIP: NEGATIVE
NRBC BLD-RTO: 0 /100 WBC
PH UR STRIP: 7 [PH] (ref 5–8)
PLATELET # BLD AUTO: 287 K/UL (ref 150–350)
PMV BLD AUTO: 10 FL (ref 9.2–12.9)
POTASSIUM SERPL-SCNC: 3.7 MMOL/L (ref 3.5–5.1)
PROT SERPL-MCNC: 6.8 G/DL (ref 6–8.4)
PROT UR QL STRIP: NEGATIVE
RBC # BLD AUTO: 3.73 M/UL (ref 4–5.4)
SODIUM SERPL-SCNC: 137 MMOL/L (ref 136–145)
SP GR UR STRIP: 1.01 (ref 1–1.03)
URN SPEC COLLECT METH UR: NORMAL
UROBILINOGEN UR STRIP-ACNC: NEGATIVE EU/DL
WBC # BLD AUTO: 10.88 K/UL (ref 3.9–12.7)

## 2021-03-23 PROCEDURE — 81025 URINE PREGNANCY TEST: CPT | Performed by: EMERGENCY MEDICINE

## 2021-03-23 PROCEDURE — 85025 COMPLETE CBC W/AUTO DIFF WBC: CPT | Performed by: EMERGENCY MEDICINE

## 2021-03-23 PROCEDURE — 99285 EMERGENCY DEPT VISIT HI MDM: CPT | Mod: 25

## 2021-03-23 PROCEDURE — 36415 COLL VENOUS BLD VENIPUNCTURE: CPT | Performed by: EMERGENCY MEDICINE

## 2021-03-23 PROCEDURE — 81003 URINALYSIS AUTO W/O SCOPE: CPT | Performed by: EMERGENCY MEDICINE

## 2021-03-23 PROCEDURE — 80053 COMPREHEN METABOLIC PANEL: CPT | Performed by: EMERGENCY MEDICINE

## 2021-03-23 PROCEDURE — 85379 FIBRIN DEGRADATION QUANT: CPT | Performed by: EMERGENCY MEDICINE

## 2021-03-24 VITALS
WEIGHT: 120 LBS | BODY MASS INDEX: 21.26 KG/M2 | TEMPERATURE: 99 F | RESPIRATION RATE: 16 BRPM | SYSTOLIC BLOOD PRESSURE: 151 MMHG | HEIGHT: 63 IN | DIASTOLIC BLOOD PRESSURE: 90 MMHG | OXYGEN SATURATION: 97 % | HEART RATE: 84 BPM

## 2021-03-24 PROCEDURE — 25500020 PHARM REV CODE 255: Performed by: EMERGENCY MEDICINE

## 2021-03-24 RX ORDER — HYDROCODONE BITARTRATE AND ACETAMINOPHEN 10; 325 MG/1; MG/1
1 TABLET ORAL EVERY 6 HOURS PRN
Qty: 12 TABLET | Refills: 0 | Status: SHIPPED | OUTPATIENT
Start: 2021-03-24 | End: 2021-03-27

## 2021-03-24 RX ADMIN — IOHEXOL 100 ML: 350 INJECTION, SOLUTION INTRAVENOUS at 12:03

## 2021-07-28 ENCOUNTER — HOSPITAL ENCOUNTER (EMERGENCY)
Facility: HOSPITAL | Age: 26
Discharge: LEFT BEFORE DISCHARGE | End: 2021-07-29
Attending: EMERGENCY MEDICINE
Payer: MEDICAID

## 2021-07-28 VITALS
WEIGHT: 125 LBS | SYSTOLIC BLOOD PRESSURE: 108 MMHG | OXYGEN SATURATION: 100 % | BODY MASS INDEX: 23 KG/M2 | RESPIRATION RATE: 20 BRPM | HEIGHT: 62 IN | HEART RATE: 84 BPM | TEMPERATURE: 98 F | DIASTOLIC BLOOD PRESSURE: 59 MMHG

## 2021-07-28 DIAGNOSIS — O20.0 THREATENED MISCARRIAGE IN EARLY PREGNANCY: Primary | ICD-10-CM

## 2021-07-28 LAB
B-HCG UR QL: POSITIVE
BACTERIA #/AREA URNS HPF: ABNORMAL /HPF
BILIRUB UR QL STRIP: NEGATIVE
CLARITY UR: ABNORMAL
COLOR UR: YELLOW
GLUCOSE UR QL STRIP: NEGATIVE
HCG INTACT+B SERPL-ACNC: NORMAL MIU/ML
HGB UR QL STRIP: ABNORMAL
KETONES UR QL STRIP: NEGATIVE
LEUKOCYTE ESTERASE UR QL STRIP: ABNORMAL
MICROSCOPIC COMMENT: ABNORMAL
NITRITE UR QL STRIP: NEGATIVE
PH UR STRIP: 6 [PH] (ref 5–8)
PROT UR QL STRIP: ABNORMAL
RBC #/AREA URNS HPF: 12 /HPF (ref 0–4)
SP GR UR STRIP: >=1.03 (ref 1–1.03)
SQUAMOUS #/AREA URNS HPF: 6 /HPF
URN SPEC COLLECT METH UR: ABNORMAL
UROBILINOGEN UR STRIP-ACNC: 1 EU/DL
WBC #/AREA URNS HPF: 25 /HPF (ref 0–5)

## 2021-07-28 PROCEDURE — 81025 URINE PREGNANCY TEST: CPT | Performed by: EMERGENCY MEDICINE

## 2021-07-28 PROCEDURE — 99283 EMERGENCY DEPT VISIT LOW MDM: CPT

## 2021-07-28 PROCEDURE — 84702 CHORIONIC GONADOTROPIN TEST: CPT | Performed by: EMERGENCY MEDICINE

## 2021-07-28 PROCEDURE — 87086 URINE CULTURE/COLONY COUNT: CPT | Performed by: EMERGENCY MEDICINE

## 2021-07-28 PROCEDURE — 81000 URINALYSIS NONAUTO W/SCOPE: CPT | Performed by: EMERGENCY MEDICINE

## 2021-07-30 LAB
BACTERIA UR CULT: NORMAL
BACTERIA UR CULT: NORMAL

## 2021-08-03 ENCOUNTER — HOSPITAL ENCOUNTER (EMERGENCY)
Facility: HOSPITAL | Age: 26
Discharge: HOME | End: 2021-08-03
Attending: EMERGENCY MEDICINE
Payer: MEDICAID

## 2021-08-03 VITALS
WEIGHT: 130 LBS | HEIGHT: 63 IN | DIASTOLIC BLOOD PRESSURE: 51 MMHG | OXYGEN SATURATION: 100 % | HEART RATE: 89 BPM | SYSTOLIC BLOOD PRESSURE: 111 MMHG | TEMPERATURE: 99 F | BODY MASS INDEX: 23.04 KG/M2 | RESPIRATION RATE: 20 BRPM

## 2021-08-03 DIAGNOSIS — Z87.891 FORMER TOBACCO USE: ICD-10-CM

## 2021-08-03 DIAGNOSIS — I95.9 HYPOTENSION, UNSPECIFIED HYPOTENSION TYPE: ICD-10-CM

## 2021-08-03 DIAGNOSIS — R55 VASOVAGAL SYNCOPE: Primary | ICD-10-CM

## 2021-08-03 DIAGNOSIS — O03.4 INCOMPLETE ABORTION: ICD-10-CM

## 2021-08-03 DIAGNOSIS — R55 NEAR SYNCOPE: ICD-10-CM

## 2021-08-03 LAB
ALBUMIN SERPL BCP-MCNC: 4 G/DL (ref 3.5–5.2)
ALP SERPL-CCNC: 68 U/L (ref 55–135)
ALT SERPL W/O P-5'-P-CCNC: 19 U/L (ref 10–44)
AMORPH CRY URNS QL MICRO: NORMAL
ANION GAP SERPL CALC-SCNC: 10 MMOL/L (ref 8–16)
APTT BLDCRRT: 21.1 SEC (ref 21–32)
AST SERPL-CCNC: 24 U/L (ref 10–40)
BACTERIA #/AREA URNS HPF: NORMAL /HPF
BASOPHILS # BLD AUTO: 0.08 K/UL (ref 0–0.2)
BASOPHILS NFR BLD: 0.5 % (ref 0–1.9)
BILIRUB SERPL-MCNC: 0.4 MG/DL (ref 0.1–1)
BILIRUB UR QL STRIP: NEGATIVE
BUN SERPL-MCNC: 8 MG/DL (ref 6–20)
CALCIUM SERPL-MCNC: 9.3 MG/DL (ref 8.7–10.5)
CHLORIDE SERPL-SCNC: 103 MMOL/L (ref 95–110)
CLARITY UR: ABNORMAL
CO2 SERPL-SCNC: 21 MMOL/L (ref 23–29)
COLOR UR: YELLOW
CREAT SERPL-MCNC: 0.7 MG/DL (ref 0.5–1.4)
DIFFERENTIAL METHOD: ABNORMAL
EOSINOPHIL # BLD AUTO: 0.1 K/UL (ref 0–0.5)
EOSINOPHIL NFR BLD: 0.5 % (ref 0–8)
ERYTHROCYTE [DISTWIDTH] IN BLOOD BY AUTOMATED COUNT: 13 % (ref 11.5–14.5)
EST. GFR  (AFRICAN AMERICAN): >60 ML/MIN/1.73 M^2
EST. GFR  (NON AFRICAN AMERICAN): >60 ML/MIN/1.73 M^2
GLUCOSE SERPL-MCNC: 95 MG/DL (ref 70–110)
GLUCOSE UR QL STRIP: NEGATIVE
HCG INTACT+B SERPL-ACNC: NORMAL MIU/ML
HCT VFR BLD AUTO: 36.3 % (ref 37–48.5)
HGB BLD-MCNC: 12 G/DL (ref 12–16)
HGB UR QL STRIP: ABNORMAL
IMM GRANULOCYTES # BLD AUTO: 0.05 K/UL (ref 0–0.04)
IMM GRANULOCYTES NFR BLD AUTO: 0.3 % (ref 0–0.5)
INR PPP: 1 (ref 0.8–1.2)
KETONES UR QL STRIP: NEGATIVE
LEUKOCYTE ESTERASE UR QL STRIP: ABNORMAL
LYMPHOCYTES # BLD AUTO: 3.5 K/UL (ref 1–4.8)
LYMPHOCYTES NFR BLD: 22.9 % (ref 18–48)
MCH RBC QN AUTO: 28.6 PG (ref 27–31)
MCHC RBC AUTO-ENTMCNC: 33.1 G/DL (ref 32–36)
MCV RBC AUTO: 87 FL (ref 82–98)
MICROSCOPIC COMMENT: NORMAL
MONOCYTES # BLD AUTO: 0.9 K/UL (ref 0.3–1)
MONOCYTES NFR BLD: 6.2 % (ref 4–15)
NEUTROPHILS # BLD AUTO: 10.5 K/UL (ref 1.8–7.7)
NEUTROPHILS NFR BLD: 69.6 % (ref 38–73)
NITRITE UR QL STRIP: NEGATIVE
NRBC BLD-RTO: 0 /100 WBC
PH UR STRIP: >8 [PH] (ref 5–8)
PLATELET # BLD AUTO: 203 K/UL (ref 150–450)
PMV BLD AUTO: 10.5 FL (ref 9.2–12.9)
POCT GLUCOSE: 80 MG/DL (ref 70–110)
POTASSIUM SERPL-SCNC: 3.4 MMOL/L (ref 3.5–5.1)
PROT SERPL-MCNC: 7.6 G/DL (ref 6–8.4)
PROT UR QL STRIP: NEGATIVE
PROTHROMBIN TIME: 10.3 SEC (ref 9–12.5)
RBC # BLD AUTO: 4.19 M/UL (ref 4–5.4)
RBC #/AREA URNS HPF: 2 /HPF (ref 0–4)
SARS-COV-2 RDRP RESP QL NAA+PROBE: NEGATIVE
SODIUM SERPL-SCNC: 134 MMOL/L (ref 136–145)
SP GR UR STRIP: 1.02 (ref 1–1.03)
URN SPEC COLLECT METH UR: ABNORMAL
UROBILINOGEN UR STRIP-ACNC: NEGATIVE EU/DL
WBC # BLD AUTO: 15.13 K/UL (ref 3.9–12.7)
WBC #/AREA URNS HPF: 2 /HPF (ref 0–5)

## 2021-08-03 PROCEDURE — 85610 PROTHROMBIN TIME: CPT | Performed by: EMERGENCY MEDICINE

## 2021-08-03 PROCEDURE — 82962 GLUCOSE BLOOD TEST: CPT

## 2021-08-03 PROCEDURE — 96360 HYDRATION IV INFUSION INIT: CPT

## 2021-08-03 PROCEDURE — 84702 CHORIONIC GONADOTROPIN TEST: CPT | Performed by: EMERGENCY MEDICINE

## 2021-08-03 PROCEDURE — 25000003 PHARM REV CODE 250: Performed by: EMERGENCY MEDICINE

## 2021-08-03 PROCEDURE — 85025 COMPLETE CBC W/AUTO DIFF WBC: CPT | Performed by: EMERGENCY MEDICINE

## 2021-08-03 PROCEDURE — 80053 COMPREHEN METABOLIC PANEL: CPT | Performed by: EMERGENCY MEDICINE

## 2021-08-03 PROCEDURE — 93005 ELECTROCARDIOGRAM TRACING: CPT

## 2021-08-03 PROCEDURE — 81000 URINALYSIS NONAUTO W/SCOPE: CPT | Performed by: EMERGENCY MEDICINE

## 2021-08-03 PROCEDURE — U0002 COVID-19 LAB TEST NON-CDC: HCPCS | Performed by: EMERGENCY MEDICINE

## 2021-08-03 PROCEDURE — 99284 EMERGENCY DEPT VISIT MOD MDM: CPT | Mod: 25

## 2021-08-03 PROCEDURE — 85730 THROMBOPLASTIN TIME PARTIAL: CPT | Performed by: EMERGENCY MEDICINE

## 2021-08-03 RX ADMIN — SODIUM CHLORIDE 1000 ML: 0.9 INJECTION, SOLUTION INTRAVENOUS at 12:08

## 2021-08-04 ENCOUNTER — HOSPITAL ENCOUNTER (OUTPATIENT)
Facility: HOSPITAL | Age: 26
Discharge: HOME | End: 2021-08-04
Attending: OBSTETRICS & GYNECOLOGY | Admitting: OBSTETRICS & GYNECOLOGY
Payer: MEDICAID

## 2021-08-04 ENCOUNTER — ANESTHESIA EVENT (OUTPATIENT)
Dept: SURGERY | Facility: HOSPITAL | Age: 26
End: 2021-08-04
Payer: MEDICAID

## 2021-08-04 ENCOUNTER — ANESTHESIA (OUTPATIENT)
Dept: SURGERY | Facility: HOSPITAL | Age: 26
End: 2021-08-04
Payer: MEDICAID

## 2021-08-04 PROBLEM — Z67.91 RH NEGATIVE STATE IN ANTEPARTUM PERIOD, FIRST TRIMESTER: Status: ACTIVE | Noted: 2021-08-04

## 2021-08-04 PROBLEM — O02.1 MISSED ABORTION: Status: ACTIVE | Noted: 2021-08-04

## 2021-08-04 PROBLEM — Z30.017 NEXPLANON INSERTION: Status: ACTIVE | Noted: 2021-08-04

## 2021-08-04 PROBLEM — O26.891 RH NEGATIVE STATE IN ANTEPARTUM PERIOD, FIRST TRIMESTER: Status: ACTIVE | Noted: 2021-08-04

## 2021-08-04 PROCEDURE — D9220A PRA ANESTHESIA: Mod: ANES,,, | Performed by: ANESTHESIOLOGY

## 2021-08-04 PROCEDURE — D9220A PRA ANESTHESIA: ICD-10-PCS | Mod: ANES,,, | Performed by: ANESTHESIOLOGY

## 2021-08-04 PROCEDURE — 63600175 PHARM REV CODE 636 W HCPCS: Performed by: ANESTHESIOLOGY

## 2021-08-04 PROCEDURE — 25000003 PHARM REV CODE 250: Performed by: NURSE ANESTHETIST, CERTIFIED REGISTERED

## 2021-08-04 PROCEDURE — 63600175 PHARM REV CODE 636 W HCPCS: Performed by: NURSE ANESTHETIST, CERTIFIED REGISTERED

## 2021-08-04 PROCEDURE — D9220A PRA ANESTHESIA: ICD-10-PCS | Mod: CRNA,,, | Performed by: NURSE ANESTHETIST, CERTIFIED REGISTERED

## 2021-08-04 PROCEDURE — D9220A PRA ANESTHESIA: Mod: CRNA,,, | Performed by: NURSE ANESTHETIST, CERTIFIED REGISTERED

## 2021-08-04 RX ORDER — MEPERIDINE HYDROCHLORIDE 50 MG/ML
INJECTION INTRAMUSCULAR; INTRAVENOUS; SUBCUTANEOUS
Status: DISCONTINUED | OUTPATIENT
Start: 2021-08-04 | End: 2021-08-04

## 2021-08-04 RX ORDER — MIDAZOLAM HYDROCHLORIDE 1 MG/ML
INJECTION, SOLUTION INTRAMUSCULAR; INTRAVENOUS
Status: DISCONTINUED | OUTPATIENT
Start: 2021-08-04 | End: 2021-08-04

## 2021-08-04 RX ORDER — ONDANSETRON 2 MG/ML
INJECTION INTRAMUSCULAR; INTRAVENOUS
Status: DISCONTINUED | OUTPATIENT
Start: 2021-08-04 | End: 2021-08-04

## 2021-08-04 RX ORDER — PROPOFOL 10 MG/ML
VIAL (ML) INTRAVENOUS
Status: DISCONTINUED | OUTPATIENT
Start: 2021-08-04 | End: 2021-08-04

## 2021-08-04 RX ORDER — DEXAMETHASONE SODIUM PHOSPHATE 4 MG/ML
INJECTION, SOLUTION INTRA-ARTICULAR; INTRALESIONAL; INTRAMUSCULAR; INTRAVENOUS; SOFT TISSUE
Status: DISCONTINUED | OUTPATIENT
Start: 2021-08-04 | End: 2021-08-04

## 2021-08-04 RX ORDER — LIDOCAINE HYDROCHLORIDE 20 MG/ML
INJECTION, SOLUTION EPIDURAL; INFILTRATION; INTRACAUDAL; PERINEURAL
Status: DISCONTINUED | OUTPATIENT
Start: 2021-08-04 | End: 2021-08-04

## 2021-08-04 RX ADMIN — PROPOFOL 50 MG: 10 INJECTION, EMULSION INTRAVENOUS at 08:08

## 2021-08-04 RX ADMIN — MIDAZOLAM HYDROCHLORIDE 2 MG: 1 INJECTION, SOLUTION INTRAMUSCULAR; INTRAVENOUS at 08:08

## 2021-08-04 RX ADMIN — DEXAMETHASONE SODIUM PHOSPHATE 4 MG: 4 INJECTION, SOLUTION INTRA-ARTICULAR; INTRALESIONAL; INTRAMUSCULAR; INTRAVENOUS; SOFT TISSUE at 08:08

## 2021-08-04 RX ADMIN — LIDOCAINE HYDROCHLORIDE 100 MG: 20 INJECTION, SOLUTION EPIDURAL; INFILTRATION; INTRACAUDAL; PERINEURAL at 08:08

## 2021-08-04 RX ADMIN — SODIUM CHLORIDE, POTASSIUM CHLORIDE, SODIUM LACTATE AND CALCIUM CHLORIDE: 600; 310; 30; 20 INJECTION, SOLUTION INTRAVENOUS at 08:08

## 2021-08-04 RX ADMIN — ONDANSETRON 4 MG: 2 INJECTION INTRAMUSCULAR; INTRAVENOUS at 08:08

## 2021-08-04 RX ADMIN — MEPERIDINE HYDROCHLORIDE 50 MG: 50 INJECTION INTRAMUSCULAR; INTRAVENOUS; SUBCUTANEOUS at 08:08

## 2021-11-01 ENCOUNTER — HOSPITAL ENCOUNTER (EMERGENCY)
Facility: HOSPITAL | Age: 26
Discharge: HOME | End: 2021-11-02
Attending: EMERGENCY MEDICINE
Payer: MEDICAID

## 2021-11-01 DIAGNOSIS — N76.0 BACTERIAL VAGINOSIS: ICD-10-CM

## 2021-11-01 DIAGNOSIS — T19.2XXA FOREIGN BODY IN VAGINA, INITIAL ENCOUNTER: ICD-10-CM

## 2021-11-01 DIAGNOSIS — N72 CERVICITIS: Primary | ICD-10-CM

## 2021-11-01 DIAGNOSIS — B96.89 BACTERIAL VAGINOSIS: ICD-10-CM

## 2021-11-01 LAB
ALBUMIN SERPL BCP-MCNC: 4.3 G/DL (ref 3.5–5.2)
ALP SERPL-CCNC: 58 U/L (ref 55–135)
ALT SERPL W/O P-5'-P-CCNC: 14 U/L (ref 10–44)
ANION GAP SERPL CALC-SCNC: 10 MMOL/L (ref 8–16)
AST SERPL-CCNC: 18 U/L (ref 10–40)
BASOPHILS # BLD AUTO: 0.05 K/UL (ref 0–0.2)
BASOPHILS NFR BLD: 0.3 % (ref 0–1.9)
BILIRUB SERPL-MCNC: 0.7 MG/DL (ref 0.1–1)
BUN SERPL-MCNC: 7 MG/DL (ref 6–20)
CALCIUM SERPL-MCNC: 9.3 MG/DL (ref 8.7–10.5)
CHLORIDE SERPL-SCNC: 101 MMOL/L (ref 95–110)
CO2 SERPL-SCNC: 19 MMOL/L (ref 23–29)
CREAT SERPL-MCNC: 0.6 MG/DL (ref 0.5–1.4)
DIFFERENTIAL METHOD: ABNORMAL
EOSINOPHIL # BLD AUTO: 0 K/UL (ref 0–0.5)
EOSINOPHIL NFR BLD: 0 % (ref 0–8)
ERYTHROCYTE [DISTWIDTH] IN BLOOD BY AUTOMATED COUNT: 13.9 % (ref 11.5–14.5)
EST. GFR  (AFRICAN AMERICAN): >60 ML/MIN/1.73 M^2
EST. GFR  (NON AFRICAN AMERICAN): >60 ML/MIN/1.73 M^2
GLUCOSE SERPL-MCNC: 148 MG/DL (ref 70–110)
HCT VFR BLD AUTO: 38 % (ref 37–48.5)
HGB BLD-MCNC: 12.8 G/DL (ref 12–16)
IMM GRANULOCYTES # BLD AUTO: 0.07 K/UL (ref 0–0.04)
IMM GRANULOCYTES NFR BLD AUTO: 0.4 % (ref 0–0.5)
LYMPHOCYTES # BLD AUTO: 1.8 K/UL (ref 1–4.8)
LYMPHOCYTES NFR BLD: 10.5 % (ref 18–48)
MCH RBC QN AUTO: 27.4 PG (ref 27–31)
MCHC RBC AUTO-ENTMCNC: 33.7 G/DL (ref 32–36)
MCV RBC AUTO: 81 FL (ref 82–98)
MONOCYTES # BLD AUTO: 1.4 K/UL (ref 0.3–1)
MONOCYTES NFR BLD: 8 % (ref 4–15)
NEUTROPHILS # BLD AUTO: 13.8 K/UL (ref 1.8–7.7)
NEUTROPHILS NFR BLD: 80.8 % (ref 38–73)
NRBC BLD-RTO: 0 /100 WBC
PLATELET # BLD AUTO: 235 K/UL (ref 150–450)
PMV BLD AUTO: 10.2 FL (ref 9.2–12.9)
POTASSIUM SERPL-SCNC: 3.4 MMOL/L (ref 3.5–5.1)
PROT SERPL-MCNC: 8.1 G/DL (ref 6–8.4)
RBC # BLD AUTO: 4.68 M/UL (ref 4–5.4)
SARS-COV-2 RDRP RESP QL NAA+PROBE: NEGATIVE
SODIUM SERPL-SCNC: 130 MMOL/L (ref 136–145)
WBC # BLD AUTO: 17.02 K/UL (ref 3.9–12.7)

## 2021-11-01 PROCEDURE — 96361 HYDRATE IV INFUSION ADD-ON: CPT

## 2021-11-01 PROCEDURE — 99285 EMERGENCY DEPT VISIT HI MDM: CPT | Mod: 25

## 2021-11-01 PROCEDURE — U0002 COVID-19 LAB TEST NON-CDC: HCPCS | Performed by: NURSE PRACTITIONER

## 2021-11-01 PROCEDURE — 85025 COMPLETE CBC W/AUTO DIFF WBC: CPT | Performed by: NURSE PRACTITIONER

## 2021-11-01 PROCEDURE — 80053 COMPREHEN METABOLIC PANEL: CPT | Performed by: NURSE PRACTITIONER

## 2021-11-01 PROCEDURE — 25000003 PHARM REV CODE 250: Performed by: NURSE PRACTITIONER

## 2021-11-01 RX ORDER — SODIUM CHLORIDE 9 MG/ML
INJECTION, SOLUTION INTRAVENOUS
Status: COMPLETED | OUTPATIENT
Start: 2021-11-01 | End: 2021-11-02

## 2021-11-01 RX ADMIN — SODIUM CHLORIDE 1000 ML: 0.9 INJECTION, SOLUTION INTRAVENOUS at 11:11

## 2021-11-02 VITALS
SYSTOLIC BLOOD PRESSURE: 128 MMHG | TEMPERATURE: 100 F | WEIGHT: 125 LBS | HEART RATE: 122 BPM | HEIGHT: 63 IN | DIASTOLIC BLOOD PRESSURE: 87 MMHG | BODY MASS INDEX: 22.15 KG/M2 | OXYGEN SATURATION: 98 % | RESPIRATION RATE: 20 BRPM

## 2021-11-02 LAB
BACTERIA #/AREA URNS HPF: NEGATIVE /HPF
BILIRUB UR QL STRIP: ABNORMAL
CLARITY UR: ABNORMAL
CLUE CELLS VAG QL WET PREP: ABNORMAL
COLOR UR: YELLOW
GLUCOSE UR QL STRIP: NEGATIVE
GROUP A STREP, MOLECULAR: NEGATIVE
HETEROPH AB SERPL QL IA: NEGATIVE
HGB UR QL STRIP: ABNORMAL
HYALINE CASTS #/AREA URNS LPF: 23 /LPF
KETONES UR QL STRIP: ABNORMAL
LACTATE SERPL-SCNC: 1 MMOL/L (ref 0.5–1.9)
LEUKOCYTE ESTERASE UR QL STRIP: NEGATIVE
MICROSCOPIC COMMENT: ABNORMAL
NITRITE UR QL STRIP: NEGATIVE
PH UR STRIP: 6 [PH] (ref 5–8)
PROT UR QL STRIP: ABNORMAL
RBC #/AREA URNS HPF: 31 /HPF (ref 0–4)
SP GR UR STRIP: >1.03 (ref 1–1.03)
SPECIMEN SOURCE: ABNORMAL
SQUAMOUS #/AREA URNS HPF: 10 /HPF
T VAGINALIS GENITAL QL WET PREP: ABNORMAL
URN SPEC COLLECT METH UR: ABNORMAL
UROBILINOGEN UR STRIP-ACNC: ABNORMAL EU/DL
WBC #/AREA URNS HPF: 7 /HPF (ref 0–5)
YEAST GENITAL QL WET PREP: ABNORMAL

## 2021-11-02 PROCEDURE — 87210 SMEAR WET MOUNT SALINE/INK: CPT | Performed by: EMERGENCY MEDICINE

## 2021-11-02 PROCEDURE — 25000003 PHARM REV CODE 250: Performed by: EMERGENCY MEDICINE

## 2021-11-02 PROCEDURE — 87205 SMEAR GRAM STAIN: CPT | Performed by: EMERGENCY MEDICINE

## 2021-11-02 PROCEDURE — 87081 CULTURE SCREEN ONLY: CPT | Performed by: EMERGENCY MEDICINE

## 2021-11-02 PROCEDURE — 63600175 PHARM REV CODE 636 W HCPCS: Performed by: EMERGENCY MEDICINE

## 2021-11-02 PROCEDURE — 25500020 PHARM REV CODE 255: Performed by: EMERGENCY MEDICINE

## 2021-11-02 PROCEDURE — 96361 HYDRATE IV INFUSION ADD-ON: CPT

## 2021-11-02 PROCEDURE — 87651 STREP A DNA AMP PROBE: CPT | Performed by: EMERGENCY MEDICINE

## 2021-11-02 PROCEDURE — 96375 TX/PRO/DX INJ NEW DRUG ADDON: CPT

## 2021-11-02 PROCEDURE — 87591 N.GONORRHOEAE DNA AMP PROB: CPT | Performed by: EMERGENCY MEDICINE

## 2021-11-02 PROCEDURE — 87040 BLOOD CULTURE FOR BACTERIA: CPT | Performed by: EMERGENCY MEDICINE

## 2021-11-02 PROCEDURE — 96365 THER/PROPH/DIAG IV INF INIT: CPT | Mod: 59

## 2021-11-02 PROCEDURE — 87491 CHLMYD TRACH DNA AMP PROBE: CPT | Performed by: EMERGENCY MEDICINE

## 2021-11-02 PROCEDURE — 83605 ASSAY OF LACTIC ACID: CPT | Performed by: EMERGENCY MEDICINE

## 2021-11-02 PROCEDURE — 81001 URINALYSIS AUTO W/SCOPE: CPT | Performed by: NURSE PRACTITIONER

## 2021-11-02 PROCEDURE — 86308 HETEROPHILE ANTIBODY SCREEN: CPT | Performed by: STUDENT IN AN ORGANIZED HEALTH CARE EDUCATION/TRAINING PROGRAM

## 2021-11-02 RX ORDER — CEFTRIAXONE 1 G/1
500 INJECTION, POWDER, FOR SOLUTION INTRAMUSCULAR; INTRAVENOUS ONCE
Status: DISCONTINUED | OUTPATIENT
Start: 2021-11-02 | End: 2021-11-02

## 2021-11-02 RX ORDER — DOXYCYCLINE 100 MG/1
100 CAPSULE ORAL EVERY 12 HOURS
Qty: 28 CAPSULE | Refills: 0 | Status: SHIPPED | OUTPATIENT
Start: 2021-11-02 | End: 2021-11-16

## 2021-11-02 RX ORDER — METRONIDAZOLE 500 MG/1
500 TABLET ORAL EVERY 12 HOURS
Qty: 28 TABLET | Refills: 0 | Status: SHIPPED | OUTPATIENT
Start: 2021-11-02 | End: 2021-11-16

## 2021-11-02 RX ORDER — CEFTRIAXONE 1 G/1
1 INJECTION, POWDER, FOR SOLUTION INTRAMUSCULAR; INTRAVENOUS ONCE
Status: DISCONTINUED | OUTPATIENT
Start: 2021-11-02 | End: 2021-11-02

## 2021-11-02 RX ORDER — ONDANSETRON 2 MG/ML
4 INJECTION INTRAMUSCULAR; INTRAVENOUS
Status: COMPLETED | OUTPATIENT
Start: 2021-11-02 | End: 2021-11-02

## 2021-11-02 RX ORDER — DOXYCYCLINE 100 MG/1
100 CAPSULE ORAL 2 TIMES DAILY
Qty: 20 CAPSULE | Refills: 0 | Status: SHIPPED | OUTPATIENT
Start: 2021-11-02 | End: 2021-11-02 | Stop reason: SDUPTHER

## 2021-11-02 RX ORDER — HYDROMORPHONE HYDROCHLORIDE 1 MG/ML
0.5 INJECTION, SOLUTION INTRAMUSCULAR; INTRAVENOUS; SUBCUTANEOUS
Status: COMPLETED | OUTPATIENT
Start: 2021-11-02 | End: 2021-11-02

## 2021-11-02 RX ADMIN — HYDROMORPHONE HYDROCHLORIDE 0.5 MG: 1 INJECTION, SOLUTION INTRAMUSCULAR; INTRAVENOUS; SUBCUTANEOUS at 12:11

## 2021-11-02 RX ADMIN — SODIUM CHLORIDE: 0.9 INJECTION, SOLUTION INTRAVENOUS at 12:11

## 2021-11-02 RX ADMIN — CEFTRIAXONE 1 G: 1 INJECTION, SOLUTION INTRAVENOUS at 02:11

## 2021-11-02 RX ADMIN — IOHEXOL 100 ML: 350 INJECTION, SOLUTION INTRAVENOUS at 01:11

## 2021-11-02 RX ADMIN — ONDANSETRON 4 MG: 2 INJECTION INTRAMUSCULAR; INTRAVENOUS at 12:11

## 2021-11-04 LAB
CHLAMYDIA, AMPLIFIED DNA: NEGATIVE
N GONORRHOEAE, AMPLIFIED DNA: NEGATIVE

## 2021-11-05 LAB
BACTERIA GENITAL AEROBE CULT: NORMAL
GRAM STN SPEC: NORMAL

## 2021-11-07 LAB
BACTERIA BLD CULT: NORMAL
BACTERIA BLD CULT: NORMAL

## 2022-05-18 ENCOUNTER — HOSPITAL ENCOUNTER (EMERGENCY)
Facility: HOSPITAL | Age: 27
Discharge: HOME | End: 2022-05-18
Attending: INTERNAL MEDICINE
Payer: MEDICAID

## 2022-05-18 VITALS
OXYGEN SATURATION: 98 % | TEMPERATURE: 99 F | DIASTOLIC BLOOD PRESSURE: 76 MMHG | HEIGHT: 63 IN | SYSTOLIC BLOOD PRESSURE: 112 MMHG | BODY MASS INDEX: 20.55 KG/M2 | RESPIRATION RATE: 26 BRPM | HEART RATE: 90 BPM | WEIGHT: 116 LBS

## 2022-05-18 DIAGNOSIS — R55 SYNCOPE, UNSPECIFIED SYNCOPE TYPE: Primary | ICD-10-CM

## 2022-05-18 DIAGNOSIS — E86.0 DEHYDRATION: ICD-10-CM

## 2022-05-18 DIAGNOSIS — D64.9 ANEMIA, UNSPECIFIED TYPE: ICD-10-CM

## 2022-05-18 DIAGNOSIS — R10.32 LEFT GROIN PAIN: ICD-10-CM

## 2022-05-18 DIAGNOSIS — E87.6 HYPOKALEMIA: ICD-10-CM

## 2022-05-18 LAB
ALBUMIN SERPL BCP-MCNC: 3.9 G/DL (ref 3.5–5.2)
ALP SERPL-CCNC: 56 U/L (ref 55–135)
ALT SERPL W/O P-5'-P-CCNC: 10 U/L (ref 10–44)
ANION GAP SERPL CALC-SCNC: 9 MMOL/L (ref 8–16)
AST SERPL-CCNC: 18 U/L (ref 10–40)
B-HCG UR QL: NEGATIVE
BACTERIA #/AREA URNS HPF: ABNORMAL /HPF
BASOPHILS # BLD AUTO: 0.02 K/UL (ref 0–0.2)
BASOPHILS NFR BLD: 0.4 % (ref 0–1.9)
BILIRUB SERPL-MCNC: 0.4 MG/DL (ref 0.1–1)
BILIRUB UR QL STRIP: ABNORMAL
BUN SERPL-MCNC: 14 MG/DL (ref 6–20)
CALCIUM SERPL-MCNC: 8.8 MG/DL (ref 8.7–10.5)
CHLORIDE SERPL-SCNC: 104 MMOL/L (ref 95–110)
CLARITY UR: CLEAR
CO2 SERPL-SCNC: 24 MMOL/L (ref 23–29)
COLOR UR: YELLOW
CREAT SERPL-MCNC: 0.8 MG/DL (ref 0.5–1.4)
DIFFERENTIAL METHOD: ABNORMAL
EOSINOPHIL # BLD AUTO: 0 K/UL (ref 0–0.5)
EOSINOPHIL NFR BLD: 0.2 % (ref 0–8)
ERYTHROCYTE [DISTWIDTH] IN BLOOD BY AUTOMATED COUNT: 13.8 % (ref 11.5–14.5)
EST. GFR  (AFRICAN AMERICAN): >60 ML/MIN/1.73 M^2
EST. GFR  (NON AFRICAN AMERICAN): >60 ML/MIN/1.73 M^2
GLUCOSE SERPL-MCNC: 101 MG/DL (ref 70–110)
GLUCOSE UR QL STRIP: NEGATIVE
HCT VFR BLD AUTO: 32 % (ref 37–48.5)
HGB BLD-MCNC: 10.4 G/DL (ref 12–16)
HGB UR QL STRIP: ABNORMAL
HYALINE CASTS #/AREA URNS LPF: 0 /LPF
IMM GRANULOCYTES # BLD AUTO: 0.01 K/UL (ref 0–0.04)
IMM GRANULOCYTES NFR BLD AUTO: 0.2 % (ref 0–0.5)
KETONES UR QL STRIP: ABNORMAL
LEUKOCYTE ESTERASE UR QL STRIP: NEGATIVE
LYMPHOCYTES # BLD AUTO: 0.7 K/UL (ref 1–4.8)
LYMPHOCYTES NFR BLD: 14.6 % (ref 18–48)
MCH RBC QN AUTO: 28 PG (ref 27–31)
MCHC RBC AUTO-ENTMCNC: 32.5 G/DL (ref 32–36)
MCV RBC AUTO: 86 FL (ref 82–98)
MICROSCOPIC COMMENT: ABNORMAL
MONOCYTES # BLD AUTO: 0.6 K/UL (ref 0.3–1)
MONOCYTES NFR BLD: 14.4 % (ref 4–15)
NEUTROPHILS # BLD AUTO: 3.1 K/UL (ref 1.8–7.7)
NEUTROPHILS NFR BLD: 70.2 % (ref 38–73)
NITRITE UR QL STRIP: NEGATIVE
NRBC BLD-RTO: 0 /100 WBC
PH UR STRIP: 6 [PH] (ref 5–8)
PLATELET # BLD AUTO: 180 K/UL (ref 150–450)
PMV BLD AUTO: 10.2 FL (ref 9.2–12.9)
POTASSIUM SERPL-SCNC: 3.1 MMOL/L (ref 3.5–5.1)
PROT SERPL-MCNC: 6.9 G/DL (ref 6–8.4)
PROT UR QL STRIP: ABNORMAL
RBC # BLD AUTO: 3.72 M/UL (ref 4–5.4)
RBC #/AREA URNS HPF: 8 /HPF (ref 0–4)
SODIUM SERPL-SCNC: 137 MMOL/L (ref 136–145)
SP GR UR STRIP: 1.02 (ref 1–1.03)
URN SPEC COLLECT METH UR: ABNORMAL
UROBILINOGEN UR STRIP-ACNC: 1 EU/DL
WBC # BLD AUTO: 4.45 K/UL (ref 3.9–12.7)
WBC #/AREA URNS HPF: 6 /HPF (ref 0–5)

## 2022-05-18 PROCEDURE — 99284 EMERGENCY DEPT VISIT MOD MDM: CPT | Mod: 25

## 2022-05-18 PROCEDURE — 80053 COMPREHEN METABOLIC PANEL: CPT | Performed by: NURSE PRACTITIONER

## 2022-05-18 PROCEDURE — 85025 COMPLETE CBC W/AUTO DIFF WBC: CPT | Performed by: NURSE PRACTITIONER

## 2022-05-18 PROCEDURE — 74176 CT RENAL STONE STUDY ABD PELVIS WO: ICD-10-PCS | Mod: 26,,, | Performed by: RADIOLOGY

## 2022-05-18 PROCEDURE — 25000003 PHARM REV CODE 250: Performed by: NURSE PRACTITIONER

## 2022-05-18 PROCEDURE — 81025 URINE PREGNANCY TEST: CPT | Performed by: NURSE PRACTITIONER

## 2022-05-18 PROCEDURE — 74176 CT ABD & PELVIS W/O CONTRAST: CPT | Mod: 26,,, | Performed by: RADIOLOGY

## 2022-05-18 PROCEDURE — 96360 HYDRATION IV INFUSION INIT: CPT

## 2022-05-18 PROCEDURE — 81000 URINALYSIS NONAUTO W/SCOPE: CPT | Performed by: NURSE PRACTITIONER

## 2022-05-18 PROCEDURE — 74176 CT ABD & PELVIS W/O CONTRAST: CPT | Mod: TC

## 2022-05-18 RX ORDER — NAPROXEN 500 MG/1
500 TABLET ORAL 2 TIMES DAILY WITH MEALS
Qty: 20 TABLET | Refills: 0 | Status: SHIPPED | OUTPATIENT
Start: 2022-05-18 | End: 2022-05-28

## 2022-05-18 RX ORDER — POTASSIUM CHLORIDE 20 MEQ/1
20 TABLET, EXTENDED RELEASE ORAL
Status: COMPLETED | OUTPATIENT
Start: 2022-05-18 | End: 2022-05-18

## 2022-05-18 RX ADMIN — POTASSIUM CHLORIDE 20 MEQ: 1500 TABLET, EXTENDED RELEASE ORAL at 12:05

## 2022-05-18 RX ADMIN — SODIUM CHLORIDE 1000 ML: 0.9 INJECTION, SOLUTION INTRAVENOUS at 10:05
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
## 2022-05-18 NOTE — Clinical Note
"Tosin Curryantha" Nely was seen and treated in our emergency department on 5/18/2022.  She may return to work on 05/19/2022.       If you have any questions or concerns, please don't hesitate to call.      Connor Figueroa RN    "

## 2022-05-18 NOTE — ED PROVIDER NOTES
Encounter Date: 5/18/2022       History     Chief Complaint   Patient presents with    Loss of Consciousness     Syncope at work yesterday at approx 1430 - possibly from working in the heat and hx of anemia    Hip Pain     L hip pain x 2 days     The history is provided by the patient.   Loss of Consciousness  This is a recurrent problem. The current episode started yesterday. The problem occurs rarely. The problem has not changed since onset.Associated symptoms include abdominal pain. Pertinent negatives include no chest pain, no headaches and no shortness of breath. Nothing aggravates the symptoms. Nothing relieves the symptoms. She has tried rest for the symptoms. The treatment provided moderate relief.   Hip Pain  Chronicity: left groin pain. The current episode started more than 2 days ago. The problem occurs constantly. The problem has not changed since onset.Associated symptoms include abdominal pain. Pertinent negatives include no chest pain, no headaches and no shortness of breath. The symptoms are aggravated by exertion, walking and coughing. Nothing relieves the symptoms. She has tried nothing for the symptoms. The treatment provided no relief.   Abdominal Pain  The current episode started several days ago. The onset of the illness was gradual. The abdominal pain is located in the groin. The abdominal pain radiates to the left flank. The severity of the abdominal pain is 6/10. The abdominal pain is relieved by nothing. The abdominal pain is exacerbated by coughing and movement. The other symptoms of the illness do not include fever, fatigue, jaundice, melena, shortness of breath, nausea, vomiting, diarrhea, dysuria, hematemesis, hematochezia, vaginal discharge or vaginal bleeding.     Review of patient's allergies indicates:  No Known Allergies  Past Medical History:   Diagnosis Date    Anemia     Rubella non-immune status, antepartum      Past Surgical History:   Procedure Laterality Date     DILATION AND CURETTAGE OF UTERUS USING SUCTION N/A 8/4/2021    Procedure: DILATION AND CURETTAGE, UTERUS, USING SUCTION;  Surgeon: Gilbert Rodriguez MD;  Location: [facility] OR;  Service: OB/GYN;  Laterality: N/A;    staph      staph infection abdomen     Family History   Problem Relation Age of Onset    Hypertension Father     Cancer Mother     Cervical cancer Mother     Breast cancer Neg Hx     Colon cancer Neg Hx     Diabetes Neg Hx     Eclampsia Neg Hx     Stroke Neg Hx     Miscarriages / Stillbirths Neg Hx     Ovarian cancer Neg Hx      Social History     Tobacco Use    Smoking status: Former Smoker     Types: Cigarettes    Smokeless tobacco: Never Used   Substance Use Topics    Alcohol use: Not Currently    Drug use: No     Review of Systems   Constitutional: Negative for fatigue and fever.   Respiratory: Negative for shortness of breath.    Cardiovascular: Positive for syncope. Negative for chest pain.   Gastrointestinal: Positive for abdominal pain. Negative for diarrhea, hematemesis, hematochezia, jaundice, melena, nausea and vomiting.   Genitourinary: Negative for dysuria, vaginal bleeding and vaginal discharge.   Musculoskeletal: Positive for joint swelling.   Neurological: Negative for headaches.   All other systems reviewed and are negative.      Physical Exam     Initial Vitals   BP Pulse Resp Temp SpO2   05/18/22 0946 05/18/22 0942 05/18/22 0942 05/18/22 0942 05/18/22 0942   110/65 99 20 99.1 °F (37.3 °C) 99 %      MAP       --                Physical Exam    Nursing note and vitals reviewed.  Constitutional: She appears well-developed and well-nourished. No distress.   HENT:   Head: Normocephalic and atraumatic.   Eyes: EOM are normal. Pupils are equal, round, and reactive to light.   Neck:   Normal range of motion.  Cardiovascular: Normal rate and regular rhythm.   No murmur heard.  Pulmonary/Chest: Breath sounds normal. No respiratory distress. She has no wheezes. She has no rhonchi.  She has no rales. She exhibits no tenderness.   Abdominal: Abdomen is soft. There is abdominal tenderness in the suprapubic area. No hernia.   No right CVA tenderness.  There is left CVA tenderness (left groin pain).       Musculoskeletal:         General: Normal range of motion.      Cervical back: Normal range of motion.      Left hip: Tenderness present.     Neurological: She is alert and oriented to person, place, and time. She has normal strength. No cranial nerve deficit or sensory deficit. GCS score is 15. GCS eye subscore is 4. GCS verbal subscore is 5. GCS motor subscore is 6.   Skin: Skin is warm and dry.   Psychiatric: She has a normal mood and affect.         ED Course   Procedures  Labs Reviewed   CBC W/ AUTO DIFFERENTIAL - Abnormal; Notable for the following components:       Result Value    RBC 3.72 (*)     Hemoglobin 10.4 (*)     Hematocrit 32.0 (*)     Lymph # 0.7 (*)     Lymph % 14.6 (*)     All other components within normal limits   COMPREHENSIVE METABOLIC PANEL - Abnormal; Notable for the following components:    Potassium 3.1 (*)     All other components within normal limits   URINALYSIS, REFLEX TO URINE CULTURE - Abnormal; Notable for the following components:    Protein, UA 1+ (*)     Ketones, UA Trace (*)     Bilirubin (UA) 1+ (*)     Occult Blood UA Trace (*)     All other components within normal limits    Narrative:     Preferred Collection Type->Urine, Clean Catch  Specimen Source->Urine   URINALYSIS MICROSCOPIC - Abnormal; Notable for the following components:    RBC, UA 8 (*)     WBC, UA 6 (*)     All other components within normal limits    Narrative:     Preferred Collection Type->Urine, Clean Catch  Specimen Source->Urine   PREGNANCY TEST, URINE RAPID    Narrative:     Specimen Source->Urine          Imaging Results          CT Renal Stone Study ABD Pelvis WO (Final result)  Result time 05/18/22 12:41:20    Final result by Kadie Mcguire MD (05/18/22 12:41:20)                  Impression:      Negative noncontrast CT.      Electronically signed by: Kadie Crocker Shayla  Date:    05/18/2022  Time:    12:41             Narrative:    EXAMINATION:  CT RENAL STONE STUDY ABD PELVIS WO    CLINICAL HISTORY:  Flank pain, kidney stone suspected;    TECHNIQUE:  Low dose axial images, sagittal and coronal reformations were obtained from the lung bases to the pubic symphysis.  Contrast was not administered.    COMPARISON:  11/02/2021    FINDINGS:  The visualized portions of the lung bases are clear.    Noncontrast CT images of the liver, gallbladder, spleen, adrenal glands, pancreas are grossly normal.  There are no renal or ureteral calculi or obstruction.  The urinary bladder is normal in appearance.  The uterus is present.  There is no free fluid or free air.    The bowel is normal in appearance without obstruction or inflammation.  There is a retrocecal appendix which is air-filled and normal in appearance.                                 Medications   sodium chloride 0.9% bolus 1,000 mL (0 mLs Intravenous Stopped 5/18/22 1137)   potassium chloride SA CR tablet 20 mEq (20 mEq Oral Given 5/18/22 1219)     Medical Decision Making:   Differential Diagnosis:   Differential includes diverticulitis, appendicitis, cholelithiasis, cholecystitis, gastritis, GERD, mesenteric ischemic, AAA, pancreatitis, pyelonephritis, food poisoning, dehydration, CHIDI, ectopic pregnancy, ovarian torsion, ovarian cyst, pyelonephritis/renal colic, pelvic inflammatory disease, endometriosis, Psoas abscess, Hernia, urinary tract infection    ED Management:  Ruled out acute findings  Chronic anemia  Treated dehydration, hypokalemia  Please return for new, changing, or worsening pain. The patient was also instructed that follow up with a primary care physician is strongly recommended after any visit to the ED.  Patient expressed understanding and agreed with treatment plan and was discharged in stable condition.                   ED Course as of 05/19/22 0131   Wed May 18, 2022   1209 Microscopic Comment: SEE COMMENT [GK]      ED Course User Index  [GK] Mary Rizo NP             Clinical Impression:   Final diagnoses:  [R55] Syncope, unspecified syncope type (Primary)  [R10.32] Left groin pain  [E87.6] Hypokalemia  [D64.9] Anemia, unspecified type  [E86.0] Dehydration          ED Disposition Condition    Discharge Stable        ED Prescriptions     Medication Sig Dispense Start Date End Date Auth. Provider    naproxen (NAPROSYN) 500 MG tablet Take 1 tablet (500 mg total) by mouth 2 (two) times daily with meals. for 10 days 20 tablet 5/18/2022 5/28/2022 Mary Rizo NP        Follow-up Information     Follow up With Specialties Details Why Contact Info    OBGYN   as scheduled     PCP  In 2 days      [facility], operated by [facility] Emergency Medicine  If symptoms worsen [address] [address] [phone]           Mary Rizo NP  05/19/22 0134

## 2022-05-18 NOTE — DISCHARGE INSTRUCTIONS
Increase fluid intake, potassium rich foods, Follow up with [facility], PCP ASAP. Return for any worsening or new symptoms.

## 2022-05-18 NOTE — ED TRIAGE NOTES
Syncope at work yesterday at approx 1430 - possibly from working in the heat and hx of anemia. Also c/o fatigue and L hip pain x 2 days.

## 2022-11-08 ENCOUNTER — TELEPHONE (OUTPATIENT)
Dept: EMERGENCY MEDICINE | Facility: HOSPITAL | Age: 27
End: 2022-11-08
Payer: MEDICAID

## 2022-11-08 NOTE — TELEPHONE ENCOUNTER
Attempted to call patient to follow up regarding LWOBS from ED yesterday. Unable to make contact with patient. Left voicemail and requested patient to return phone call to ED. Contact number provided.

## 2023-07-25 ENCOUNTER — TELEPHONE (OUTPATIENT)
Dept: FAMILY MEDICINE | Facility: CLINIC | Age: 28
End: 2023-07-25
Payer: MEDICAID

## 2023-07-25 NOTE — TELEPHONE ENCOUNTER
Left voice message regarding cancelling COVID -19 VACCINE for 8/1/23.   No one to give vaccine on this day./mp

## 2023-09-07 ENCOUNTER — LAB VISIT (OUTPATIENT)
Dept: LAB | Facility: HOSPITAL | Age: 28
End: 2023-09-07
Attending: PHYSICIAN ASSISTANT
Payer: MEDICAID

## 2023-09-07 DIAGNOSIS — Z86.2 HISTORY OF ANEMIA: ICD-10-CM

## 2023-09-07 LAB
BASOPHILS # BLD AUTO: 0.07 K/UL (ref 0–0.2)
BASOPHILS NFR BLD: 1 % (ref 0–1.9)
DIFFERENTIAL METHOD: ABNORMAL
EOSINOPHIL # BLD AUTO: 0.1 K/UL (ref 0–0.5)
EOSINOPHIL NFR BLD: 0.9 % (ref 0–8)
ERYTHROCYTE [DISTWIDTH] IN BLOOD BY AUTOMATED COUNT: 12.7 % (ref 11.5–14.5)
HCT VFR BLD AUTO: 34.5 % (ref 37–48.5)
HGB BLD-MCNC: 11 G/DL (ref 12–16)
IMM GRANULOCYTES # BLD AUTO: 0.01 K/UL (ref 0–0.04)
IMM GRANULOCYTES NFR BLD AUTO: 0.1 % (ref 0–0.5)
LYMPHOCYTES # BLD AUTO: 2.4 K/UL (ref 1–4.8)
LYMPHOCYTES NFR BLD: 34 % (ref 18–48)
MCH RBC QN AUTO: 27.9 PG (ref 27–31)
MCHC RBC AUTO-ENTMCNC: 31.9 G/DL (ref 32–36)
MCV RBC AUTO: 88 FL (ref 82–98)
MONOCYTES # BLD AUTO: 0.4 K/UL (ref 0.3–1)
MONOCYTES NFR BLD: 6 % (ref 4–15)
NEUTROPHILS # BLD AUTO: 4.1 K/UL (ref 1.8–7.7)
NEUTROPHILS NFR BLD: 58 % (ref 38–73)
NRBC BLD-RTO: 0 /100 WBC
PLATELET # BLD AUTO: 215 K/UL (ref 150–450)
PMV BLD AUTO: 10.5 FL (ref 9.2–12.9)
RBC # BLD AUTO: 3.94 M/UL (ref 4–5.4)
WBC # BLD AUTO: 7.03 K/UL (ref 3.9–12.7)

## 2023-09-07 PROCEDURE — 36415 COLL VENOUS BLD VENIPUNCTURE: CPT | Performed by: PHYSICIAN ASSISTANT

## 2023-09-07 PROCEDURE — 85025 COMPLETE CBC W/AUTO DIFF WBC: CPT | Performed by: PHYSICIAN ASSISTANT

## 2023-09-27 ENCOUNTER — OFFICE VISIT (OUTPATIENT)
Dept: ORTHOPEDICS | Facility: CLINIC | Age: 28
End: 2023-09-27
Payer: MEDICAID

## 2023-09-27 ENCOUNTER — HOSPITAL ENCOUNTER (EMERGENCY)
Facility: HOSPITAL | Age: 28
Discharge: HOME | End: 2023-09-27
Attending: STUDENT IN AN ORGANIZED HEALTH CARE EDUCATION/TRAINING PROGRAM
Payer: MEDICAID

## 2023-09-27 VITALS
RESPIRATION RATE: 16 BRPM | SYSTOLIC BLOOD PRESSURE: 104 MMHG | HEIGHT: 62 IN | WEIGHT: 119 LBS | DIASTOLIC BLOOD PRESSURE: 59 MMHG | OXYGEN SATURATION: 99 % | HEART RATE: 80 BPM | BODY MASS INDEX: 21.9 KG/M2 | TEMPERATURE: 98 F

## 2023-09-27 VITALS — HEIGHT: 62 IN | RESPIRATION RATE: 18 BRPM | WEIGHT: 119.06 LBS | BODY MASS INDEX: 21.91 KG/M2
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
## 2023-09-27 DIAGNOSIS — S62.021A CLOSED TRANSVERSE FRACTURE OF WAIST OF SCAPHOID BONE OF RIGHT WRIST, INITIAL ENCOUNTER: Primary | ICD-10-CM

## 2023-09-27 DIAGNOSIS — S62.101A WRIST FRACTURE, CLOSED, RIGHT, INITIAL ENCOUNTER: Primary | ICD-10-CM

## 2023-09-27 DIAGNOSIS — S62.101A WRIST FRACTURE, CLOSED, RIGHT, INITIAL ENCOUNTER: ICD-10-CM

## 2023-09-27 DIAGNOSIS — M25.531 RIGHT WRIST PAIN: ICD-10-CM

## 2023-09-27 DIAGNOSIS — S52.501A CLOSED FRACTURE DISTAL RADIUS AND ULNA, RIGHT, INITIAL ENCOUNTER: ICD-10-CM

## 2023-09-27 DIAGNOSIS — Z01.818 PRE-OP TESTING: ICD-10-CM

## 2023-09-27 DIAGNOSIS — S52.601A CLOSED FRACTURE DISTAL RADIUS AND ULNA, RIGHT, INITIAL ENCOUNTER: ICD-10-CM

## 2023-09-27 PROCEDURE — 73130 X-RAY EXAM OF HAND: CPT | Mod: TC,RT

## 2023-09-27 PROCEDURE — 99204 PR OFFICE/OUTPT VISIT, NEW, LEVL IV, 45-59 MIN: ICD-10-PCS | Mod: 57,S$PBB,, | Performed by: ORTHOPAEDIC SURGERY

## 2023-09-27 PROCEDURE — 73130 X-RAY EXAM OF HAND: CPT | Mod: 26,RT,, | Performed by: RADIOLOGY

## 2023-09-27 PROCEDURE — 29125 APPL SHORT ARM SPLINT STATIC: CPT | Mod: RT

## 2023-09-27 PROCEDURE — 99204 OFFICE O/P NEW MOD 45 MIN: CPT | Mod: 57,S$PBB,, | Performed by: ORTHOPAEDIC SURGERY

## 2023-09-27 PROCEDURE — 99283 EMERGENCY DEPT VISIT LOW MDM: CPT | Mod: 27

## 2023-09-27 PROCEDURE — 73130 XR HAND COMPLETE 3 VIEW RIGHT: ICD-10-PCS | Mod: 26,RT,, | Performed by: RADIOLOGY

## 2023-09-27 PROCEDURE — 73090 XR FOREARM RIGHT: ICD-10-PCS | Mod: 26,RT,, | Performed by: RADIOLOGY

## 2023-09-27 PROCEDURE — 73090 X-RAY EXAM OF FOREARM: CPT | Mod: TC,RT

## 2023-09-27 PROCEDURE — 73090 X-RAY EXAM OF FOREARM: CPT | Mod: 26,RT,, | Performed by: RADIOLOGY

## 2023-09-27 PROCEDURE — 99213 OFFICE O/P EST LOW 20 MIN: CPT | Mod: PBBFAC | Performed by: ORTHOPAEDIC SURGERY

## 2023-09-27 PROCEDURE — 25000003 PHARM REV CODE 250: Performed by: STUDENT IN AN ORGANIZED HEALTH CARE EDUCATION/TRAINING PROGRAM

## 2023-09-27 PROCEDURE — 99999 PR PBB SHADOW E&M-EST. PATIENT-LVL III: CPT | Mod: PBBFAC,,, | Performed by: ORTHOPAEDIC SURGERY

## 2023-09-27 PROCEDURE — 99999 PR PBB SHADOW E&M-EST. PATIENT-LVL III: ICD-10-PCS | Mod: PBBFAC,,, | Performed by: ORTHOPAEDIC SURGERY

## 2023-09-27 RX ORDER — HYDROCODONE BITARTRATE AND ACETAMINOPHEN 5; 325 MG/1; MG/1
1 TABLET ORAL EVERY 6 HOURS PRN
Qty: 12 TABLET | Refills: 0 | Status: SHIPPED | OUTPATIENT
Start: 2023-09-27 | End: 2023-09-30

## 2023-09-27 RX ORDER — HYDROCODONE BITARTRATE AND ACETAMINOPHEN 5; 325 MG/1; MG/1
1 TABLET ORAL
Status: COMPLETED | OUTPATIENT
Start: 2023-09-27 | End: 2023-09-27

## 2023-09-27 RX ORDER — CEPHALEXIN 500 MG/1
500 CAPSULE ORAL 4 TIMES DAILY
Qty: 20 CAPSULE | Refills: 0 | Status: SHIPPED | OUTPATIENT
Start: 2023-09-27 | End: 2023-10-11 | Stop reason: ALTCHOICE

## 2023-09-27 RX ADMIN — HYDROCODONE BITARTRATE AND ACETAMINOPHEN 1 TABLET: 5; 325 TABLET ORAL at 09:09
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
## 2023-09-27 NOTE — ED PROVIDER NOTES
Encounter Date: 9/27/2023       History     Chief Complaint   Patient presents with    Fall     Patient states she tripped over a trash can and landed on her right arm.  Patient complaining of pain to right arm from elbow to hand.     28-year-old female history of anemia, presents to ED right arm injury, after mechanical fall after tripping on a trash can.  She reports pain to right elbow that radiates to the right hand.  Pain is worsened with movement and improves with rest.  She denies motor or sensory deficits.    The history is provided by the patient. No  was used.     Review of patient's allergies indicates:  No Known Allergies  Past Medical History:   Diagnosis Date    Anemia     Herpes simplex virus (HSV) infection     Rubella non-immune status, antepartum      Past Surgical History:   Procedure Laterality Date    DILATION AND CURETTAGE OF UTERUS USING SUCTION N/A 8/4/2021    Procedure: DILATION AND CURETTAGE, UTERUS, USING SUCTION;  Surgeon: Gilbert Rodriguez MD;  Location: [facility] OR;  Service: OB/GYN;  Laterality: N/A;    staph      staph infection abdomen     Family History   Problem Relation Age of Onset    Hypertension Father     Cancer Mother     Cervical cancer Mother     Breast cancer Neg Hx     Colon cancer Neg Hx     Diabetes Neg Hx     Eclampsia Neg Hx     Stroke Neg Hx     Miscarriages / Stillbirths Neg Hx     Ovarian cancer Neg Hx      Social History     Tobacco Use    Smoking status: Former     Types: Cigarettes    Smokeless tobacco: Never   Substance Use Topics    Alcohol use: Not Currently    Drug use: No     Review of Systems   Constitutional: Negative.    HENT: Negative.     Eyes: Negative.    Respiratory: Negative.     Cardiovascular: Negative.    Gastrointestinal: Negative.    Endocrine: Negative.    Genitourinary: Negative.    Musculoskeletal:  Positive for arthralgias and myalgias.   Skin: Negative.    Allergic/Immunologic: Negative.    Neurological: Negative.     Hematological: Negative.    Psychiatric/Behavioral: Negative.     All other systems reviewed and are negative.      Physical Exam     Initial Vitals [09/27/23 0757]   BP Pulse Resp Temp SpO2   (!) 104/59 80 16 98.4 °F (36.9 °C) 99 %      MAP       --         Physical Exam    Nursing note and vitals reviewed.  Constitutional: She appears well-developed.   HENT:   Head: Normocephalic.   Eyes: Pupils are equal, round, and reactive to light.   Neck:   Normal range of motion.  Cardiovascular:  Normal rate.           Pulmonary/Chest: Breath sounds normal. No respiratory distress.   Abdominal: Abdomen is soft.   Musculoskeletal:      Cervical back: Normal range of motion.      Comments: Tenderness to palpation right wrist, right snuffbox, right forearm, right elbow.  Neurovascularly intact     Neurological: She is alert and oriented to person, place, and time. She has normal strength. No cranial nerve deficit. GCS score is 15. GCS eye subscore is 4. GCS verbal subscore is 5. GCS motor subscore is 6.   Skin: Skin is warm. Capillary refill takes less than 2 seconds.         ED Course   Procedures  Labs Reviewed - No data to display       Imaging Results              X-Ray Hand 3 view Right (Final result)  Result time 09/27/23 08:40:57      Final result by Kadie Mcguire MD (09/27/23 08:40:57)                   Impression:      Lucency at the waist of the scaphoid bone seen on one view only.  Nondisplaced scaphoid waist fracture not ruled out.  Possible fracture of the distal ulna noted on today's forearm x-rays is not as well demonstrated on these images.      Electronically signed by: Kadie Mcguire  Date:    09/27/2023  Time:    08:40               Narrative:    EXAMINATION:  XR HAND COMPLETE 3 VIEW RIGHT    CLINICAL HISTORY:  fall;    TECHNIQUE:  PA, lateral, and oblique views of the right hand were performed.    COMPARISON:  Forearm x-rays the same date    FINDINGS:  There is a lucency through the waist of  the scaphoid bone seen on only one view.  The carpal bones are otherwise intact and normally aligned.  The bones of the hand are intact.  The irregularity of the cortex of the distal ulna noted on today's forearm views is not as well demonstrated here.  There is mild soft tissue swelling at the wrist.                                       X-Ray Forearm Right (Final result)  Result time 09/27/23 08:38:25      Final result by Kadie Mcguire MD (09/27/23 08:38:25)                   Impression:      Findings suspicious for a subtle distal ulnar fracture, please correlate with the site of patient pain.      Electronically signed by: Kadie Mcguire  Date:    09/27/2023  Time:    08:38               Narrative:    EXAMINATION:  XR FOREARM RIGHT    CLINICAL HISTORY:  Unspecified fall, initial encounter    TECHNIQUE:  AP and lateral views of the right forearm were performed.    COMPARISON:  None    FINDINGS:  There is a subtle offset in the cortex of the distal ulnar diaphysis suspicious for a minimally impacted fracture.  The radius is intact.  No abnormalities are seen at the elbow.                                       Medications   HYDROcodone-acetaminophen 5-325 mg per tablet 1 tablet (has no administration in time range)     Medical Decision Making  28-year-old female history of anemia, presents to ED right arm injury, after mechanical fall after tripping on a trash can.  She reports pain to right elbow that radiates to the right hand.  Pain is worsened with movement and improves with rest.  She denies motor or sensory deficits.  ----------  On exam there is some tenderness to palpation right wrist, right snuffbox, right forearm, right elbow.  Neurovascularly intact.  X-ray shows possible nondisplaced scaphoid waist fracture on 1 image, as well as possible distal ulnar fracture.   Wrist splint placed without any complications.  Neurovascularly intact post splint placement.  Given a dose of Norco tab in the  ED.  Rx Norco times 3 days.  Patient was seen and reevaluated. Patient's symptoms seem to be stable. I discussed the patient's diagnosis, treatment plan, and plan for discharge with the patient. Patient was referral instructed to follow up with ortho and was given strict return precautions to the ED. The patient voiced understanding and agreed with the plan      Amount and/or Complexity of Data Reviewed  Radiology: ordered.    Risk  Prescription drug management.                               Clinical Impression:   Final diagnoses:  [W19.XXXA] Fall  [W19.XXXA] Fall - pain from elbow to hand  [S62.101A] Wrist fracture, closed, right, initial encounter (Primary)  [W19.XXXA] Fall, initial encounter        ED Disposition Condition    Discharge Stable          ED Prescriptions       Medication Sig Dispense Start Date End Date Auth. Provider    HYDROcodone-acetaminophen (NORCO) 5-325 mg per tablet Take 1 tablet by mouth every 6 (six) hours as needed for Pain. 12 tablet 9/27/2023 9/30/2023 Brian De MD          Follow-up Information    None          Brian De MD  09/27/23 0900

## 2023-09-27 NOTE — PROGRESS NOTES
Subjective:      Patient ID: Tosin Mckay is a 28 y.o. female.    Chief Complaint: Injury and Pain of the Right Wrist    Referring Provider: Brian De Md  [address] [facility].  [facility] [address]  [place] [address]    HPI:   Ms. Mckay is a 28-year-old right-hand-dominant lady who presented today for evaluation of several hours of right wrist pain which began this morning 09/27/2023 after she tripped over a trash can and fell onto an outstretched hand forcefully dorsiflexion her right hand/ wrist.  She was seen the emergency room and placed in a splint after x-rays showed a nondisplaced distal ulna neck fracture and a scaphoid waist fracture.  Moving her wrist side-to-side or pronating and supinating her wrist increases her symptoms while nothing improves them.  She states she gets intermittent radiation of pain to her elbow.  She denied new onset numbness or tingling in her hand.    Past Medical History:   Diagnosis Date    Anemia     Herpes simplex virus (HSV) infection      * Rubella non-immune status, antepartum  Headaches      Past Surgical History:   Procedure Laterality Date    DILATION AND CURETTAGE OF UTERUS USING SUCTION N/A 8/4/2021    Procedure: DILATION AND CURETTAGE, UTERUS, USING SUCTION;  Surgeon: Gilbert Rodriguez MD;  Location: [facility];  Service: OB/GYN;  Laterality: N/A;     IRRIGATION AND DEBRIDEMENT ABDOMINAL CARBUNCLE      staph infection abdomen       Review of patient's allergies indicates:  No Known Allergies    Social History     Occupational History     Patient access rep   Tobacco Use    Smoking status: Former     Types: Cigarettes    Smokeless tobacco: Never   Substance and Sexual Activity    Alcohol use: Not Currently    Drug use: No    Sexual activity: Yes     Partners: Male     Birth control/protection: Implant      Family History   Problem Relation Age of Onset    Hypertension Father     Cancer Mother     Cervical cancer Mother     Breast cancer Neg Hx     Colon cancer Neg Hx      Diabetes Neg Hx     Eclampsia Neg Hx     Stroke Neg Hx     Miscarriages / Stillbirths Neg Hx     Ovarian cancer Neg Hx        Previous Hospitalizations:   Childbirth    ROS:   ROS        Objective:      Physical Exam:   General: AAOx3.  No acute distress  HEENT: Normocephalic, PEARLA EOMI, Good Dentition  Neck: Supple, No JVD  Chest: Symetric, equal excursion on inspiration  Abdomen: Soft NTND  Vascular:  Pulses intact and equal bilaterally.  Capillary refill less than 3 seconds and equal bilaterally  Neurologic:  Pinprick and soft touch intact and equal bilaterally  Integment:  No ecchymosis, no errythema.  Multiple tattoos on forearm  Extremity:  Wrist/hand:  Pronation / supination right wrist 45/45 degrees due to tenderness, left wrist 90/ 90.  Dorsiflexion / volar flexion right wrist 45/45 degrees due to tenderness, left wrist 80/ 80.  Mild swelling right wrist.  Tender in the anatomic snuffbox right wrist.  Nontender at the 1st dorsal compartment bilaterally.  No swelling at the 1st dorsal compartment bilaterally.  Finkelstein's negative both wrist.  Grind test negative both wrist.  Tender over the scapholunate interval right wrist.  Nontender at the DRUJ / TFCC both wrist.  Tender with palpation ulnar neck right wrist.  Good finger motion although the patient states she has some pain with finger motion.                 Elbow: Pronation / supination right elbow 45/45 degrees due to tenderness at wrist, left elbow 90/ 90.  Extension/ flexion both elbows equal 0/128 degrees.  Nontender with palpation radial head both elbows.  Nontender with palpation medial and lateral epicondyles both elbows.  Radial / ulna stressing equal bilaterally with endpoint.  Nontender with palpation olecranon both elbows.  No swelling at the olecranon both elbows.  Nontender with palpation triceps insertion on the olecranon bilaterally.  Triceps palpable throughout full distribution both elbows  Radiography:  Personally reviewed   x-rays of the right hand completed on 09/27/2023 showed a nondisplaced scaphoid waist fracture.  X-rays of the right forearm completed on 09/27/2023 showed a nondisplaced distal ulnar neck fracture without intra-articular extension      Assessment:       Impression:      1. Closed transverse fracture of waist of scaphoid bone of right wrist, initial encounter    2. Nondisplaced transverse fracture ulnar neck, right wrist   3. Right wrist pain         Plan:       1.  Discussed physical examination and radiographic findings with the patient. Tosin understands that she has a scaphoid waist fracture and ulnar neck fracture of her right wrist.  Treatment alternatives and outcomes were discussed with the patient she understands she could be treated conservatively with observation, activity modification, NSAIDs, bracing, casting, or she could consider surgical intervention with internal fixation.  Discussed in detail with the patient that it is okay to treat with cast immobilization but she would need a long-arm cast or surgically both are equally acceptable but there is the potential for her develop a nonunion or AVN of her scaphoid, and many times it is recommended to proceed with internal fixation to decrease the possibility of developing a nonunion or AVN.  She stated she wants to proceed with surgery.  2.  Possible complications of surgery to include bleeding, infection, scarring, nerve/ blood vessel / tendon damage, need for further surgery, failed surgery, failure to improve, possible persistent pain, possible arthritis, possible arthrofibrosis, possible fracture, possible hardware failure, possible AVN, possible nonunion and possible hardware breakage were discussed with the patient.  The patient was permitted to ask questions and all concerns were addressed to her satisfaction.  3. Consent for internal fixation scaphoid fracture, right wrist.    4.  Tentatively schedule surgery for 09/28/2023.  5. Place in a  removable off-the-shelf long thumb spica splint.    6.  Norco 7.5/325, 1 p.o. Q 6 hours p.r.n. pain, dispense 28, refill 0, prescription was given to the patient to have filled.  7. Keflex 500 mg, 1 p.o. q.I.d., dispense 20, refill 0, a prescription was forwarded to the patient's pharmacy by [facility] she understands she is to start this after surgery.    8.  One-handed activities with the left hand only no lifting/ pushing/ pulling/ climbing requiring the use of the right hand.  No activities on ladders /scaffolding / stairs.  No sports.  9. Elevate and ice right wrist.  10.  Follow up 10 to 12 days post op.

## 2023-09-27 NOTE — Clinical Note
Romarioangy Mike accompanied their spouse to the emergency department on 9/27/2023. They may return to work on 09/28/2023.      If you have any questions or concerns, please don't hesitate to call.      VASILIY Worthy RN

## 2023-09-27 NOTE — H&P (VIEW-ONLY)
Chief Complaint: Injury and Pain of the Right Wrist    HPI:   Ms. Mckay is a 28-year-old right-hand-dominant lady who presented today for evaluation of several hours of right wrist pain which began this morning 09/27/2023 after she tripped over a trash can and fell onto an outstretched hand forcefully dorsiflexion her right hand/ wrist.  She was seen the emergency room and placed in a splint after x-rays showed a nondisplaced distal ulna neck fracture and a scaphoid waist fracture.  Moving her wrist side-to-side or pronating and supinating her wrist increases her symptoms while nothing improves them.  She states she gets intermittent radiation of pain to her elbow.  She denied new onset numbness or tingling in her hand.    Past Medical History:   Diagnosis Date    Anemia     Herpes simplex virus (HSV) infection      * Rubella non-immune status, antepartum  Headaches      Past Surgical History:   Procedure Laterality Date    DILATION AND CURETTAGE OF UTERUS USING SUCTION N/A 8/4/2021    Procedure: DILATION AND CURETTAGE, UTERUS, USING SUCTION;  Surgeon: Gilbert Rodriguez MD;  Location: [facility];  Service: OB/GYN;  Laterality: N/A;     IRRIGATION AND DEBRIDEMENT ABDOMINAL CARBUNCLE      staph infection abdomen       Review of patient's allergies indicates:  No Known Allergies    Social History     Occupational History     Patient access rep   Tobacco Use    Smoking status: Former     Types: Cigarettes    Smokeless tobacco: Never   Substance and Sexual Activity    Alcohol use: Not Currently    Drug use: No    Sexual activity: Yes     Partners: Male     Birth control/protection: Implant      Family History   Problem Relation Age of Onset    Hypertension Father     Cancer Mother     Cervical cancer Mother     Breast cancer Neg Hx     Colon cancer Neg Hx     Diabetes Neg Hx     Eclampsia Neg Hx     Stroke Neg Hx     Miscarriages / Stillbirths Neg Hx     Ovarian cancer Neg Hx        Previous Hospitalizations:    Childbirth    ROS:   ROS        Objective:      Physical Exam:   General: AAOx3.  No acute distress  HEENT: Normocephalic, PEARLA EOMI, Good Dentition  Neck: Supple, No JVD  Chest: Symetric, equal excursion on inspiration  Abdomen: Soft NTND  Vascular:  Pulses intact and equal bilaterally.  Capillary refill less than 3 seconds and equal bilaterally  Neurologic:  Pinprick and soft touch intact and equal bilaterally  Integment:  No ecchymosis, no errythema.  Multiple tattoos on forearm  Extremity:  Wrist/hand:  Pronation / supination right wrist 45/45 degrees due to tenderness, left wrist 90/ 90.  Dorsiflexion / volar flexion right wrist 45/45 degrees due to tenderness, left wrist 80/ 80.  Mild swelling right wrist.  Tender in the anatomic snuffbox right wrist.  Nontender at the 1st dorsal compartment bilaterally.  No swelling at the 1st dorsal compartment bilaterally.  Finkelstein's negative both wrist.  Grind test negative both wrist.  Tender over the scapholunate interval right wrist.  Nontender at the DRUJ / TFCC both wrist.  Tender with palpation ulnar neck right wrist.  Good finger motion although the patient states she has some pain with finger motion.                 Elbow: Pronation / supination right elbow 45/45 degrees due to tenderness at wrist, left elbow 90/ 90.  Extension/ flexion both elbows equal 0/128 degrees.  Nontender with palpation radial head both elbows.  Nontender with palpation medial and lateral epicondyles both elbows.  Radial / ulna stressing equal bilaterally with endpoint.  Nontender with palpation olecranon both elbows.  No swelling at the olecranon both elbows.  Nontender with palpation triceps insertion on the olecranon bilaterally.  Triceps palpable throughout full distribution both elbows  Radiography:  Personally reviewed  x-rays of the right hand completed on 09/27/2023 showed a nondisplaced scaphoid waist fracture.  X-rays of the right forearm completed on 09/27/2023 showed a  nondisplaced distal ulnar neck fracture without intra-articular extension      Assessment:       Impression:      1. Closed transverse fracture of waist of scaphoid bone of right wrist, initial encounter    2. Nondisplaced transverse fracture ulnar neck, right wrist   3. Right wrist pain         Plan:       1.  Discussed physical examination and radiographic findings with the patient. Tosin understands that she has a scaphoid waist fracture and ulnar neck fracture of her right wrist.  Treatment alternatives and outcomes were discussed with the patient she understands she could be treated conservatively with observation, activity modification, NSAIDs, bracing, casting, or she could consider surgical intervention with internal fixation.  Discussed in detail with the patient that it is okay to treat with cast immobilization but she would need a long-arm cast or surgically both are equally acceptable but there is the potential for her develop a nonunion or AVN of her scaphoid, and many times it is recommended to proceed with internal fixation to decrease the possibility of developing a nonunion or AVN.  She stated she wants to proceed with surgery.  2.  Possible complications of surgery to include bleeding, infection, scarring, nerve/ blood vessel / tendon damage, need for further surgery, failed surgery, failure to improve, possible persistent pain, possible arthritis, possible arthrofibrosis, possible fracture, possible hardware failure, possible AVN, possible nonunion and possible hardware breakage were discussed with the patient.  The patient was permitted to ask questions and all concerns were addressed to her satisfaction.  3. Consent for internal fixation scaphoid fracture, right wrist.    4.  Tentatively schedule surgery for 09/28/2023.  5. Place in a removable off-the-shelf long thumb spica splint.    6.  Norco 7.5/325, 1 p.o. Q 6 hours p.r.n. pain, dispense 28, refill 0, prescription was given to the  patient to have filled.  7. Keflex 500 mg, 1 p.o. q.I.d., dispense 20, refill 0, a prescription was forwarded to the patient's pharmacy by [facility] she understands she is to start this after surgery.    8.  One-handed activities with the left hand only no lifting/ pushing/ pulling/ climbing requiring the use of the right hand.  No activities on ladders /scaffolding / stairs.  No sports.  9. Elevate and ice right wrist.  10.  Follow up 10 to 12 days post op.

## 2023-09-27 NOTE — Clinical Note
"Tosin Curryantha" Nely was seen and treated in our emergency department on 9/27/2023.  She may return to work on 10/01/2023.       If you have any questions or concerns, please don't hesitate to call.      VASILIY Worthy RN    "

## 2023-09-28 ENCOUNTER — HOSPITAL ENCOUNTER (OUTPATIENT)
Facility: HOSPITAL | Age: 28
Discharge: HOME | End: 2023-09-28
Attending: ORTHOPAEDIC SURGERY | Admitting: ORTHOPAEDIC SURGERY
Payer: MEDICAID

## 2023-09-28 ENCOUNTER — ANESTHESIA (OUTPATIENT)
Dept: SURGERY | Facility: HOSPITAL | Age: 28
End: 2023-09-28
Payer: MEDICAID

## 2023-09-28 ENCOUNTER — ANESTHESIA EVENT (OUTPATIENT)
Dept: SURGERY | Facility: HOSPITAL | Age: 28
End: 2023-09-28
Payer: MEDICAID

## 2023-09-28 VITALS
DIASTOLIC BLOOD PRESSURE: 68 MMHG | HEIGHT: 62 IN | OXYGEN SATURATION: 100 % | WEIGHT: 119 LBS | TEMPERATURE: 97 F | HEART RATE: 66 BPM | SYSTOLIC BLOOD PRESSURE: 112 MMHG | RESPIRATION RATE: 6 BRPM | BODY MASS INDEX: 21.9 KG/M2

## 2023-09-28 DIAGNOSIS — S62.021A CLOSED TRANSVERSE FRACTURE OF WAIST OF SCAPHOID BONE OF RIGHT WRIST, INITIAL ENCOUNTER: Primary | ICD-10-CM

## 2023-09-28 PROCEDURE — D9220A PRA ANESTHESIA: Mod: ANES,,, | Performed by: ANESTHESIOLOGY

## 2023-09-28 PROCEDURE — 25999: ICD-10-PCS | Mod: RT,,, | Performed by: ORTHOPAEDIC SURGERY

## 2023-09-28 PROCEDURE — C1713 ANCHOR/SCREW BN/BN,TIS/BN: HCPCS | Performed by: ORTHOPAEDIC SURGERY

## 2023-09-28 PROCEDURE — 36000709 HC OR TIME LEV III EA ADD 15 MIN: Performed by: ORTHOPAEDIC SURGERY

## 2023-09-28 PROCEDURE — 63600175 PHARM REV CODE 636 W HCPCS: Performed by: ORTHOPAEDIC SURGERY

## 2023-09-28 PROCEDURE — D9220A PRA ANESTHESIA: Mod: CRNA,,, | Performed by: NURSE ANESTHETIST, CERTIFIED REGISTERED

## 2023-09-28 PROCEDURE — 63600175 PHARM REV CODE 636 W HCPCS: Mod: UD | Performed by: NURSE ANESTHETIST, CERTIFIED REGISTERED

## 2023-09-28 PROCEDURE — 63600175 PHARM REV CODE 636 W HCPCS: Performed by: ANESTHESIOLOGY

## 2023-09-28 PROCEDURE — C1769 GUIDE WIRE: HCPCS | Performed by: ORTHOPAEDIC SURGERY

## 2023-09-28 PROCEDURE — 36000708 HC OR TIME LEV III 1ST 15 MIN: Performed by: ORTHOPAEDIC SURGERY

## 2023-09-28 PROCEDURE — 25000003 PHARM REV CODE 250: Performed by: NURSE ANESTHETIST, CERTIFIED REGISTERED

## 2023-09-28 PROCEDURE — 37000008 HC ANESTHESIA 1ST 15 MINUTES: Performed by: ORTHOPAEDIC SURGERY

## 2023-09-28 PROCEDURE — 71000033 HC RECOVERY, INTIAL HOUR: Performed by: ORTHOPAEDIC SURGERY

## 2023-09-28 PROCEDURE — 25999 UNLISTED PX FOREARM/WRIST: CPT | Mod: RT,,, | Performed by: ORTHOPAEDIC SURGERY

## 2023-09-28 PROCEDURE — 25000003 PHARM REV CODE 250: Performed by: ORTHOPAEDIC SURGERY

## 2023-09-28 PROCEDURE — 37000009 HC ANESTHESIA EA ADD 15 MINS: Performed by: ORTHOPAEDIC SURGERY

## 2023-09-28 PROCEDURE — 71000015 HC POSTOP RECOV 1ST HR: Performed by: ORTHOPAEDIC SURGERY

## 2023-09-28 PROCEDURE — 63600175 PHARM REV CODE 636 W HCPCS: Performed by: NURSE ANESTHETIST, CERTIFIED REGISTERED

## 2023-09-28 PROCEDURE — D9220A PRA ANESTHESIA: ICD-10-PCS | Mod: CRNA,,, | Performed by: NURSE ANESTHETIST, CERTIFIED REGISTERED

## 2023-09-28 PROCEDURE — D9220A PRA ANESTHESIA: ICD-10-PCS | Mod: ANES,,, | Performed by: ANESTHESIOLOGY

## 2023-09-28 RX ORDER — LIDOCAINE HYDROCHLORIDE 10 MG/ML
1 INJECTION, SOLUTION EPIDURAL; INFILTRATION; INTRACAUDAL; PERINEURAL ONCE
Status: CANCELLED | OUTPATIENT
Start: 2023-09-28 | End: 2023-09-28

## 2023-09-28 RX ORDER — MIDAZOLAM HYDROCHLORIDE 1 MG/ML
INJECTION INTRAMUSCULAR; INTRAVENOUS
Status: DISCONTINUED | OUTPATIENT
Start: 2023-09-28 | End: 2023-09-28

## 2023-09-28 RX ORDER — FENTANYL CITRATE 50 UG/ML
INJECTION, SOLUTION INTRAMUSCULAR; INTRAVENOUS
Status: DISCONTINUED | OUTPATIENT
Start: 2023-09-28 | End: 2023-09-28

## 2023-09-28 RX ORDER — KETOROLAC TROMETHAMINE 30 MG/ML
15 INJECTION, SOLUTION INTRAMUSCULAR; INTRAVENOUS ONCE
Status: DISCONTINUED | OUTPATIENT
Start: 2023-09-28 | End: 2023-09-28 | Stop reason: HOSPADM

## 2023-09-28 RX ORDER — KETOROLAC TROMETHAMINE 30 MG/ML
INJECTION, SOLUTION INTRAMUSCULAR; INTRAVENOUS
Status: DISCONTINUED | OUTPATIENT
Start: 2023-09-28 | End: 2023-09-28

## 2023-09-28 RX ORDER — SCOLOPAMINE TRANSDERMAL SYSTEM 1 MG/1
1 PATCH, EXTENDED RELEASE TRANSDERMAL
Status: CANCELLED | OUTPATIENT
Start: 2023-09-28

## 2023-09-28 RX ORDER — SODIUM CHLORIDE, SODIUM LACTATE, POTASSIUM CHLORIDE, CALCIUM CHLORIDE 600; 310; 30; 20 MG/100ML; MG/100ML; MG/100ML; MG/100ML
125 INJECTION, SOLUTION INTRAVENOUS CONTINUOUS
Status: DISCONTINUED | OUTPATIENT
Start: 2023-09-28 | End: 2023-09-28 | Stop reason: HOSPADM

## 2023-09-28 RX ORDER — PROPOFOL 10 MG/ML
VIAL (ML) INTRAVENOUS
Status: DISCONTINUED | OUTPATIENT
Start: 2023-09-28 | End: 2023-09-28

## 2023-09-28 RX ORDER — LIDOCAINE HYDROCHLORIDE 20 MG/ML
INJECTION, SOLUTION EPIDURAL; INFILTRATION; INTRACAUDAL; PERINEURAL
Status: DISCONTINUED | OUTPATIENT
Start: 2023-09-28 | End: 2023-09-28

## 2023-09-28 RX ORDER — ACETAMINOPHEN 10 MG/ML
INJECTION, SOLUTION INTRAVENOUS
Status: DISCONTINUED | OUTPATIENT
Start: 2023-09-28 | End: 2023-09-28

## 2023-09-28 RX ORDER — DEXAMETHASONE SODIUM PHOSPHATE 4 MG/ML
INJECTION, SOLUTION INTRA-ARTICULAR; INTRALESIONAL; INTRAMUSCULAR; INTRAVENOUS; SOFT TISSUE
Status: DISCONTINUED | OUTPATIENT
Start: 2023-09-28 | End: 2023-09-28

## 2023-09-28 RX ORDER — SODIUM CHLORIDE, SODIUM LACTATE, POTASSIUM CHLORIDE, CALCIUM CHLORIDE 600; 310; 30; 20 MG/100ML; MG/100ML; MG/100ML; MG/100ML
INJECTION, SOLUTION INTRAVENOUS CONTINUOUS
Status: CANCELLED | OUTPATIENT
Start: 2023-09-28

## 2023-09-28 RX ORDER — ONDANSETRON 2 MG/ML
4 INJECTION INTRAMUSCULAR; INTRAVENOUS DAILY PRN
Status: DISCONTINUED | OUTPATIENT
Start: 2023-09-28 | End: 2023-09-28 | Stop reason: HOSPADM

## 2023-09-28 RX ORDER — MORPHINE SULFATE 2 MG/ML
2 INJECTION, SOLUTION INTRAMUSCULAR; INTRAVENOUS EVERY 5 MIN PRN
Status: DISCONTINUED | OUTPATIENT
Start: 2023-09-28 | End: 2023-09-28 | Stop reason: HOSPADM

## 2023-09-28 RX ORDER — BUPIVACAINE HYDROCHLORIDE 5 MG/ML
INJECTION, SOLUTION EPIDURAL; INTRACAUDAL
Status: DISCONTINUED | OUTPATIENT
Start: 2023-09-28 | End: 2023-09-28 | Stop reason: HOSPADM

## 2023-09-28 RX ORDER — ONDANSETRON 2 MG/ML
INJECTION INTRAMUSCULAR; INTRAVENOUS
Status: DISCONTINUED | OUTPATIENT
Start: 2023-09-28 | End: 2023-09-28

## 2023-09-28 RX ORDER — FAMOTIDINE 10 MG/ML
20 INJECTION INTRAVENOUS ONCE
Status: CANCELLED | OUTPATIENT
Start: 2023-09-28 | End: 2023-09-28

## 2023-09-28 RX ORDER — DIPHENHYDRAMINE HYDROCHLORIDE 50 MG/ML
12.5 INJECTION INTRAMUSCULAR; INTRAVENOUS
Status: DISCONTINUED | OUTPATIENT
Start: 2023-09-28 | End: 2023-09-28 | Stop reason: HOSPADM

## 2023-09-28 RX ORDER — SODIUM CHLORIDE, SODIUM LACTATE, POTASSIUM CHLORIDE, CALCIUM CHLORIDE 600; 310; 30; 20 MG/100ML; MG/100ML; MG/100ML; MG/100ML
INJECTION, SOLUTION INTRAVENOUS CONTINUOUS PRN
Status: DISCONTINUED | OUTPATIENT
Start: 2023-09-28 | End: 2023-09-28

## 2023-09-28 RX ORDER — SUCCINYLCHOLINE CHLORIDE 20 MG/ML
INJECTION INTRAMUSCULAR; INTRAVENOUS
Status: DISCONTINUED | OUTPATIENT
Start: 2023-09-28 | End: 2023-09-28

## 2023-09-28 RX ADMIN — SODIUM CHLORIDE, POTASSIUM CHLORIDE, SODIUM LACTATE AND CALCIUM CHLORIDE: 600; 310; 30; 20 INJECTION, SOLUTION INTRAVENOUS at 12:09

## 2023-09-28 RX ADMIN — LIDOCAINE HYDROCHLORIDE 100 MG: 20 INJECTION, SOLUTION EPIDURAL; INFILTRATION; INTRACAUDAL; PERINEURAL at 12:09

## 2023-09-28 RX ADMIN — MORPHINE SULFATE 2 MG: 2 INJECTION, SOLUTION INTRAMUSCULAR; INTRAVENOUS at 02:09

## 2023-09-28 RX ADMIN — PROPOFOL 150 MG: 10 INJECTION, EMULSION INTRAVENOUS at 12:09

## 2023-09-28 RX ADMIN — CEFAZOLIN 2 G: 2 INJECTION, POWDER, FOR SOLUTION INTRAMUSCULAR; INTRAVENOUS at 12:09

## 2023-09-28 RX ADMIN — ACETAMINOPHEN 1000 MG: 10 INJECTION INTRAVENOUS at 12:09

## 2023-09-28 RX ADMIN — FENTANYL CITRATE 100 MCG: 50 INJECTION, SOLUTION INTRAMUSCULAR; INTRAVENOUS at 12:09

## 2023-09-28 RX ADMIN — DEXAMETHASONE SODIUM PHOSPHATE 8 MG: 4 INJECTION, SOLUTION INTRAMUSCULAR; INTRAVENOUS at 12:09

## 2023-09-28 RX ADMIN — ONDANSETRON 8 MG: 2 INJECTION INTRAMUSCULAR; INTRAVENOUS at 12:09

## 2023-09-28 RX ADMIN — KETOROLAC TROMETHAMINE 30 MG: 30 INJECTION, SOLUTION INTRAMUSCULAR; INTRAVENOUS at 01:09

## 2023-09-28 RX ADMIN — MIDAZOLAM HYDROCHLORIDE 2 MG: 1 INJECTION, SOLUTION INTRAMUSCULAR; INTRAVENOUS at 12:09

## 2023-09-28 RX ADMIN — SUCCINYLCHOLINE CHLORIDE 100 MG: 20 INJECTION, SOLUTION INTRAMUSCULAR; INTRAVENOUS at 12:09

## 2023-09-28 NOTE — ANESTHESIA POSTPROCEDURE EVALUATION
Anesthesia Post Evaluation    Patient: Tosin Mckay    Procedure(s) Performed: Procedure(s) (LRB):  ORIF Scaphoid Right Wrist (Right)    Final Anesthesia Type: general      Patient location during evaluation: PACU  Patient participation: Yes- Able to Participate  Level of consciousness: awake and alert  Post-procedure vital signs: reviewed and stable  Pain management: adequate  Airway patency: patent    PONV status at discharge: No PONV  Anesthetic complications: no      Cardiovascular status: blood pressure returned to baseline  Respiratory status: unassisted  Hydration status: euvolemic  Follow-up not needed.          Vitals Value Taken Time   /66 09/28/23 1341   Temp 36.8 °C (98.2 °F) 09/28/23 0952   Pulse 67 09/28/23 1345   Resp 17 09/28/23 1345   SpO2 100 % 09/28/23 1345   Vitals shown include unvalidated device data.      No case tracking events are documented in the log.      Pain/Obed Score: Pain Rating Prior to Med Admin: 8 (9/27/2023  9:20 AM)  Pain Rating Post Med Admin: 4 (9/27/2023  9:37 AM)

## 2023-09-28 NOTE — TRANSFER OF CARE
"Anesthesia Transfer of Care Note    Patient: Tosin Mckay    Procedure(s) Performed: Procedure(s) (LRB):  ORIF Scaphoid Right Wrist (Right)    Patient location: PACU    Transport from OR: Transported from OR on room air with adequate spontaneous ventilation    Post pain: adequate analgesia    Post assessment: no apparent anesthetic complications and tolerated procedure well    Post vital signs: stable    Level of consciousness: responds to stimulation and awake    Nausea/Vomiting: no nausea/vomiting    Complications: none    Transfer of care protocol was followed      Last vitals:   Visit Vitals  /64 (BP Location: Left arm, Patient Position: Lying)   Pulse 72   Temp 36.8 °C (98.2 °F) (Tympanic)   Resp 18   Ht 5' 2" (1.575 m)   Wt 54 kg (119 lb)   LMP 09/03/2023 (Exact Date)   SpO2 99%   Breastfeeding No   BMI 21.77 kg/m²     "

## 2023-09-28 NOTE — OP NOTE
[facility]  Orthopedic Surgery    9/28/2023    Tosin Mckay  [id number]      PREOPERATIVE DIAGNOSIS: Closed transverse fracture of waist of scaphoid bone of right wrist, initial encounter [S62.179T]    POSTOPERATIVE DIAGNOSIS:  Minimally displaced scaphoid waist fracture, right wrist    PROCEDURE:  1. Reduction and percutaneous screw fixation scaphoid waist, right wrist, with 1 skeletal Dynamics 2.5 mm Reduct headless compression screw.    2. Short-arm thumb spica plaster splint, right wrist.      SURGEON: Oscar Bhandari D.O.    ASSISTANT: Orton Grinnell, CFA    ANESTHESIA:  General.    BLOOD LOSS:  Less than 5 cc.    TOURNIQUET:  Non applicable.    DRAINS:  None.    PATHOLOGY:  None.    COMPLICATION:  None.    INDICATIONS FOR PROCEDURE:   Ms. Mckay is a 28-year-old right-hand-dominant lady who has right wrist pain which began on 09/27/2023 after she tripped over a trash can and fell onto an outstretched hand forcefully dorsiflexion her right hand/ wrist.  She was seen the emergency room and placed in a splint after x-rays showed a nondisplaced distal ulna neck fracture and a scaphoid waist fracture.  Moving her wrist side-to-side or pronating and supinating her wrist increases her symptoms while nothing improves them.  She gets intermittent radiation of pain to her elbow.  She denied new onset numbness or tingling in her hand.   She elected to proceed with surgery after complications to include bleeding, infection, scarring, nerve/blood vessel/tendon damage, need for further surgery, failed surgery, failure to improve, stiffness, arthritis, fracture, hardware failure, hardware breakage, and possible nonunion were discussed.  She signed a consent.    PROCEDURE IN DETAIL:   The patient was brought to the operating room and transferred to the operating bed where all bony prominences were well padded.  General anesthesia was then administered by the Anesthesiology Department.  After general anesthesia  was administered a tourniquet was applied to the upper part of the patient's right upper extremity, this was not inflated throughout the procedure.  The patient's right arm was then prepped with chlorhexidine solution and draped in the normal sterile fashion.  After prepping and draping bony and soft tissue landmarks were identified.  The patient's wrist was then positioned and the scaphoid was positioned making a double ring sign under fluoroscopic orthogonal views.         Once the scaphoid was in the appropriate position and reduced to anatomic alignment a stab incision was made over the dorsal aspect of the patient's wrist with a #15 Blade followed by dissection with mosquito hemostat to the proximal scaphoid.  A guidewire was placed in the center of the double ring and advanced from the dorsal aspect of the patient's wrist across the scaphoid and out the palmar aspect of her wrist.  The guidewire was brought out the palmar aspect under fluoroscopic orthogonal views until the proximal aspect of the pin was at the proximal scaphoid pole.  The placement of the guidewire was checked with fluoroscopic orthogonal views and when it was found to be in the appropriate position the guidewire was redirected out the dorsal aspect of the patient's wrist.  Once the guidewire was seated appropriately at the distal pole very just beneath the subchondral bone a guidewire was measured and appropriate length screw was chosen.  A countersink was then utilized to open the proximal pole of the scaphoid and then the screw was placed on the guidewire and advanced from the proximal pole across the waist and into the distal fragment under fluoroscopic orthogonal views feeling good compression of the fracture site.  Once the screw was placed within the scaphoid the guidewire was removed and final fluoroscopic orthogonal views were taken showing anatomic alignment of the scaphoid with good placement and seating of the screw.       The  incision was then copiously irrigated and then closed with nylon suture in a simple interrupted fashion.  Her wrist was then dressed with Adaptic, sterile gauze, sterile cast padding followed by placement of a thumb spica and ulnar gutter short-arm plaster splint.  Ace wraps were then placed.  The patient was then awakened by anesthesia and transferred from the operating room to the recovery room in stable condition.  She tolerated the procedure well without complication.

## 2023-09-28 NOTE — DISCHARGE SUMMARY
[facility] [place] [facility]  Discharge Note  Short Stay    Procedure(s) (LRB):  ORIF Scaphoid Right Wrist (Right)      OUTCOME: Patient tolerated treatment/procedure well without complication and is now ready for discharge.    DISPOSITION: Home or Self Care    FINAL DIAGNOSIS:  Closed transverse fracture of waist of scaphoid bone of right wrist, initial encounter    FOLLOWUP: In clinic    DISCHARGE INSTRUCTIONS:    Discharge Procedure Orders   Diet Adult Regular     Keep surgical extremity elevated     Ice to affected area     Lifting restrictions   Order Comments: One-handed activities with the left hand only no lifting/pushing/pulling/climbing requiring the use of the right hand.  No activities on ladders/scaffolding/stairs.  No sports.  Must work in a clean dry environment.     Other restrictions (specify):   Order Comments: 1. Elevate and ice right wrist.    2. Do not remove dressing, keep dressing clean and dry.  3. One-handed activities with the left hand only no lifting/pushing/pulling/climbing requiring the use of the right hand.  No activities on ladders/scaffolding/stairs.  No sports.  Must work in a clean dry environment.  4. Bring removable off-the-shelf wrist splint to office for possible application at next visit.     Notify your health care provider if you experience any of the following:  temperature >100.4     Leave dressing on - Keep it clean, dry, and intact until clinic visit   Order Comments: Do not remove dressing, keep dressing clean and dry.        TIME SPENT ON DISCHARGE: 20 minutes

## 2023-09-28 NOTE — ANESTHESIA PREPROCEDURE EVALUATION
09/28/2023  Tosin Mckay is a 28 y.o., female.      Pre-op Assessment    I have reviewed the Patient Summary Reports.     I have reviewed the Nursing Notes. I have reviewed the NPO Status.   I have reviewed the Medications.     Review of Systems  Social:  Former Smoker    Hematology/Oncology:  Hematology Normal   Oncology Normal     EENT/Dental:EENT/Dental Normal   Cardiovascular:  Cardiovascular Normal     Pulmonary:  Pulmonary Normal    Renal/:  Renal/ Normal     Hepatic/GI:  Hepatic/GI Normal    Musculoskeletal:  Musculoskeletal Normal    Neurological:  Neurology Normal    Dermatological:  Skin Normal    Psych:  Psychiatric Normal           Physical Exam    Airway:  Mallampati: II   Mouth Opening: Normal  TM Distance: Normal  Tongue: Normal  Neck ROM: Normal ROM    Chest/Lungs:  Clear to auscultation    Heart:  Rate: Normal  Rhythm: Regular Rhythm        Anesthesia Plan  Type of Anesthesia, risks & benefits discussed:    Anesthesia Type: Gen ETT  Intra-op Monitoring Plan: Standard ASA Monitors  Post Op Pain Control Plan: multimodal analgesia  Induction:  IV  Airway Plan: Direct and Video  Informed Consent: Informed consent signed with the Patient and all parties understand the risks and agree with anesthesia plan.  All questions answered.   ASA Score: 1  Day of Surgery Review of History & Physical: H&P Update referred to the surgeon/provider.    Ready For Surgery From Anesthesia Perspective.     .

## 2023-09-28 NOTE — ANESTHESIA PROCEDURE NOTES
Intubation    Date/Time: 9/28/2023 12:39 PM    Performed by: Brooklyn Zuñiga CRNA  Authorized by: Drew Cormier MD    Intubation:     Induction:  Intravenous    Intubated:  Postinduction    Mask Ventilation:  Easy mask    Attempts:  1    Attempted By:  CRNA    Method of Intubation:  Video laryngoscopy    Blade:  [place] 3    Laryngeal View Grade: Grade I - full view of cords      Difficult Airway Encountered?: No      Complications:  None    Airway Device:  Oral endotracheal tube    Airway Device Size:  7.0    Style/Cuff Inflation:  Cuffed (inflated to minimal occlusive pressure)    Tube secured:  21    Secured at:  The lips    Placement Verified By:  Capnometry    Complicating Factors:  None    Findings Post-Intubation:  BS equal bilateral and atraumatic/condition of teeth unchanged

## 2023-09-28 NOTE — PLAN OF CARE
Discharge and follow up instructions given to patient and her boyfriend. Both verbalized understanding and voiced no complaints or concerns.

## 2023-10-03 ENCOUNTER — PATIENT MESSAGE (OUTPATIENT)
Dept: ORTHOPEDICS | Facility: CLINIC | Age: 28
End: 2023-10-03
Payer: MEDICAID

## 2023-10-04 ENCOUNTER — DOCUMENTATION ONLY (OUTPATIENT)
Dept: ORTHOPEDICS | Facility: CLINIC | Age: 28
End: 2023-10-04
Payer: MEDICAID

## 2023-10-11 ENCOUNTER — OFFICE VISIT (OUTPATIENT)
Dept: ORTHOPEDICS | Facility: CLINIC | Age: 28
End: 2023-10-11
Payer: MEDICAID

## 2023-10-11 VITALS — RESPIRATION RATE: 16 BRPM | WEIGHT: 119.06 LBS | HEIGHT: 62 IN | BODY MASS INDEX: 21.91 KG/M2

## 2023-10-11 DIAGNOSIS — S62.021D: Primary | ICD-10-CM

## 2023-10-11 DIAGNOSIS — S52.201D: ICD-10-CM

## 2023-10-11 PROCEDURE — 99024 PR POST-OP FOLLOW-UP VISIT: ICD-10-PCS | Mod: ,,, | Performed by: ORTHOPAEDIC SURGERY

## 2023-10-11 PROCEDURE — 1159F MED LIST DOCD IN RCRD: CPT | Mod: CPTII,,, | Performed by: ORTHOPAEDIC SURGERY

## 2023-10-11 PROCEDURE — 29075 APPL CST ELBW FNGR SHORT ARM: CPT | Mod: PBBFAC,RT | Performed by: ORTHOPAEDIC SURGERY

## 2023-10-11 PROCEDURE — 29075 PR APPLY FOREARM CAST: ICD-10-PCS | Mod: S$PBB,58,RT, | Performed by: ORTHOPAEDIC SURGERY

## 2023-10-11 PROCEDURE — 29075 APPL CST ELBW FNGR SHORT ARM: CPT | Mod: S$PBB,58,RT, | Performed by: ORTHOPAEDIC SURGERY

## 2023-10-11 PROCEDURE — 99999PBSHW PR PBB SHADOW TECHNICAL ONLY FILED TO HB: ICD-10-PCS | Mod: PBBFAC,,,

## 2023-10-11 PROCEDURE — 1159F PR MEDICATION LIST DOCUMENTED IN MEDICAL RECORD: ICD-10-PCS | Mod: CPTII,,, | Performed by: ORTHOPAEDIC SURGERY

## 2023-10-11 PROCEDURE — 99024 POSTOP FOLLOW-UP VISIT: CPT | Mod: ,,, | Performed by: ORTHOPAEDIC SURGERY

## 2023-10-11 PROCEDURE — 99999 PR PBB SHADOW E&M-EST. PATIENT-LVL III: CPT | Mod: PBBFAC,,, | Performed by: ORTHOPAEDIC SURGERY

## 2023-10-11 PROCEDURE — 99999 PR PBB SHADOW E&M-EST. PATIENT-LVL III: ICD-10-PCS | Mod: PBBFAC,,, | Performed by: ORTHOPAEDIC SURGERY

## 2023-10-11 PROCEDURE — 99999PBSHW PR PBB SHADOW TECHNICAL ONLY FILED TO HB: Mod: PBBFAC,,,

## 2023-10-11 PROCEDURE — 99213 OFFICE O/P EST LOW 20 MIN: CPT | Mod: PBBFAC,25 | Performed by: ORTHOPAEDIC SURGERY

## 2023-10-11 NOTE — PROGRESS NOTES
Subjective:      Patient ID: Tosin Mckay is a 28 y.o. female.    Chief Complaint: Pain and Post-op Evaluation of the Right Wrist      HPI:   Ms. Mckay returned today for her 1st postop visit after reduction and internal fixation of a scaphoid waist fracture of her right wrist performed on 09/28/2023.  She states she is doing well and her pain is much improved.  She has been in a splint since surgery. She injured her right wrist on 09/27/2023 after she tripped over a trash can and fell onto an outstretched hand forcefully dorsiflexing her right hand/ wrist.    ROS:  New diagnosis/ surgery/prescriptions since last office visit on 09/27/2023:  Reduction and internal fixation scaphoid waist fracture, right wrist.  Review of Systems   Constitutional: Negative for chills and fever.   HENT:  Negative for congestion.    Eyes:  Negative for blurred vision and double vision.   Cardiovascular:  Negative for chest pain and cyanosis.   Respiratory:  Negative for cough and shortness of breath.    Endocrine: Negative for polydipsia.   Hematologic/Lymphatic: Negative for adenopathy.   Skin:  Negative for flushing, itching and skin cancer.   Musculoskeletal:  Negative for gout and muscle cramps.   Gastrointestinal:  Negative for constipation and diarrhea.   Genitourinary:  Negative for nocturia.   Neurological:  Positive for headaches. Negative for seizures.   Psychiatric/Behavioral:  Negative for depression. The patient does not have insomnia and is not nervous/anxious.    Allergic/Immunologic: Negative for environmental allergies.       Objective:      Physical Exam:   General: AAOx3.  No acute distress  Vascular:  Pulses intact and equal bilaterally.  Capillary refill less than 3 seconds and equal bilaterally  Neurologic:  Pinprick and soft touch intact and equal bilaterally  Integment:   Dorsal wrist incision well approximated with sutures in place.  Extremity:   Wrist/hand:    Splint in good condition, with splint removed:  Mild ecchymosis.  Tattoos.    Minimal swelling right hand.  Good finger motion with relatively no pain. Relatively nontender with palpation ulnar head.  Relatively nontender with palpation scaphoid tubercle and anatomic snuffbox.  Mild tenderness with wrist motion.  Radiography:  No new x-rays done today.      Assessment:       Impression:     1. Reduction with internal fixation scaphoid waist, right wrist   2. Nondisplaced ulnar neck fracture, right wrist         Plan:       1.  Discussed physical examination with the patient. Tosin understands that she had a reduction with internal fixation of a scaphoid waist fracture of her right wrist.  She understands she appears to be doing well.    2.  Discussed placing the patient in a removable off-the-shelf thumb spica wrist splint versus cast immobilization she stated she would not wear the splint and would prefer cast.    3.  Place in a well-molded short-arm thumb spica fiberglass cast .  4. Cast care instructions were discussed and given to the patient  5.  Keep cast clean and dry do not stick anything in cast.    6.  One-handed activities with the left hand only no lifting/ pushing/ pulling /climbing requiring the use of the right hand.  No activities on ladders /scaffolding / stairs.  No sports.  Must work in a clean dry environment.  May use right hand to type.  7. Any pain can be treated with over-the-counter medications dosed per box instructions.  8. Remove sutures and place Steri-Strips.    9. Follow up in 1 month with an x-ray out of plaster right wrist.

## 2023-11-03 ENCOUNTER — OFFICE VISIT (OUTPATIENT)
Dept: ORTHOPEDICS | Facility: CLINIC | Age: 28
End: 2023-11-03
Payer: MEDICAID

## 2023-11-03 ENCOUNTER — HOSPITAL ENCOUNTER (OUTPATIENT)
Dept: RADIOLOGY | Facility: HOSPITAL | Age: 28
Discharge: HOME | End: 2023-11-03
Attending: ORTHOPAEDIC SURGERY
Payer: MEDICAID

## 2023-11-03 VITALS — BODY MASS INDEX: 21.9 KG/M2 | HEIGHT: 62 IN | RESPIRATION RATE: 16 BRPM | WEIGHT: 119 LBS

## 2023-11-03 DIAGNOSIS — S62.021D: Primary | ICD-10-CM

## 2023-11-03 DIAGNOSIS — S62.021D: ICD-10-CM

## 2023-11-03 PROCEDURE — 99999PBSHW PR PBB SHADOW TECHNICAL ONLY FILED TO HB: ICD-10-PCS | Mod: PBBFAC,,,

## 2023-11-03 PROCEDURE — 73110 X-RAY EXAM OF WRIST: CPT | Mod: TC,PN,RT

## 2023-11-03 PROCEDURE — 99024 POSTOP FOLLOW-UP VISIT: CPT | Mod: ,,, | Performed by: ORTHOPAEDIC SURGERY

## 2023-11-03 PROCEDURE — 99024 PR POST-OP FOLLOW-UP VISIT: ICD-10-PCS | Mod: ,,, | Performed by: ORTHOPAEDIC SURGERY

## 2023-11-03 PROCEDURE — 99999 PR PBB SHADOW E&M-EST. PATIENT-LVL II: ICD-10-PCS | Mod: PBBFAC,,, | Performed by: ORTHOPAEDIC SURGERY

## 2023-11-03 PROCEDURE — 99212 OFFICE O/P EST SF 10 MIN: CPT | Mod: PBBFAC,PN,25 | Performed by: ORTHOPAEDIC SURGERY

## 2023-11-03 PROCEDURE — 99999PBSHW PR PBB SHADOW TECHNICAL ONLY FILED TO HB: Mod: PBBFAC,,,

## 2023-11-03 PROCEDURE — 1159F PR MEDICATION LIST DOCUMENTED IN MEDICAL RECORD: ICD-10-PCS | Mod: CPTII,,, | Performed by: ORTHOPAEDIC SURGERY

## 2023-11-03 PROCEDURE — 29075 APPL CST ELBW FNGR SHORT ARM: CPT | Mod: S$PBB,58,RT, | Performed by: ORTHOPAEDIC SURGERY

## 2023-11-03 PROCEDURE — 29075 APPL CST ELBW FNGR SHORT ARM: CPT | Mod: PBBFAC,PN | Performed by: ORTHOPAEDIC SURGERY

## 2023-11-03 PROCEDURE — 73110 XR WRIST COMPLETE 3 VIEWS RIGHT: ICD-10-PCS | Mod: 26,RT,, | Performed by: RADIOLOGY

## 2023-11-03 PROCEDURE — 99999 PR PBB SHADOW E&M-EST. PATIENT-LVL II: CPT | Mod: PBBFAC,,, | Performed by: ORTHOPAEDIC SURGERY

## 2023-11-03 PROCEDURE — 29075 PR APPLY FOREARM CAST: ICD-10-PCS | Mod: S$PBB,58,RT, | Performed by: ORTHOPAEDIC SURGERY

## 2023-11-03 PROCEDURE — 1159F MED LIST DOCD IN RCRD: CPT | Mod: CPTII,,, | Performed by: ORTHOPAEDIC SURGERY

## 2023-11-03 PROCEDURE — 73110 X-RAY EXAM OF WRIST: CPT | Mod: 26,RT,, | Performed by: RADIOLOGY

## 2023-11-03 NOTE — PROGRESS NOTES
Patient ID: Tosin Mckay is a 28 y.o. female.     Chief Complaint: Pain and Post-op Evaluation of the Right Wrist        HPI:   Ms. Mckay returned today for re-application of the cast on her right upper extremity.  On Monday 10/30/2023 she was giving her daughter bath and fell into the bathtub soaking her cast.  She self removed her cast and has been wearing a removable off-the-shelf cock-up thumb spica splint since.  Her pain is well controlled.  She had a reduction and internal fixation of a scaphoid waist fracture on 09/28/2023.  She is doing well and her pain is well controlled. She injured her right wrist on 09/27/2023 after she tripped over a trash can and fell onto an outstretched hand forcefully dorsiflexing her right hand/ wrist.     ROS:  No new diagnosis/ surgery/prescriptions since last office visit on 10/11/2023.  Constitutional: Negative for chills and fever.   HENT:  Negative for congestion.    Eyes:  Negative for blurred vision and double vision.   Cardiovascular:  Negative for chest pain and cyanosis.   Respiratory:  Negative for cough and shortness of breath.    Endocrine: Negative for polydipsia.   Hematologic/Lymphatic: Negative for adenopathy.   Skin:  Negative for flushing, itching and skin cancer.   Musculoskeletal:  Negative for gout and muscle cramps.   Gastrointestinal:  Negative for constipation and diarrhea.   Genitourinary:  Negative for nocturia.   Neurological:  Positive for headaches. Negative for seizures.   Psychiatric/Behavioral:  Negative for depression. The patient does not have insomnia and is not nervous/anxious.    Allergic/Immunologic: Negative for environmental allergies.      Objective:      Physical Exam:   General: AAOx3.  No acute distress  Vascular:  Pulses intact and equal bilaterally.  Capillary refill less than 3 seconds and equal bilaterally  Neurologic:  Pinprick and soft touch intact and equal bilaterally  Integment:   Dorsal wrist incision well approximated  and well healed.  Extremity:   Wrist/hand:  No ecchymosis.  Tattoos.  No swelling right hand.  Good finger motion without pain. Relatively nontender with palpation ulnar head.  Relatively nontender with palpation scaphoid tubercle and anatomic snuffbox.  Mild tenderness with wrist motion.  Radiography:  Personally reviewed x-rays of the right wrist completed on 11/03/2023 which showed an anatomically aligned scaphoid waist fracture with an intramedullary screw in place.  Screw placement is appropriate.      Assessment:      Impression:      1. Reduction with internal fixation scaphoid waist, right wrist   2. Nondisplaced ulnar neck fracture, right wrist      Plan:      1.  Discussed physical examination and radiographic evaluation with the patient. Tosin understands that she is maintaining good alignment of her scaphoid.  2. Place in a well-molded short-arm thumb spica fiberglass cast.    3. Cast care instructions were reinforced with the patient.  4. Any pain can be treated with over-the-counter medications dosed per box instructions.    5. Do not stick anything in cast keep cast clean and dry.    6. One-handed activities with the left hand only no lifting/pushing/pulling/climbing requiring the use of the right hand.  No activities on ladders/scaffolding/stairs.  No sports.  Must work in a clean dry environment.    7. Bring removable off-the-shelf thumb spica splint to office at next visit for possible application.  8. Follow up in 1 month with an x-ray out of plaster of the right wrist.

## 2023-11-03 NOTE — LETTER
November 3, 2023      [facility]  [address]  [place] [address]  Phone: [phone]  Fax: [phone]       Patient: Tosin Mckay   YOB: 1995  Date of Visit: 11/03/2023    To Whom It May Concern:    Fidencio Mckay  was at [facility] on 11/03/2023. The patient may return to work on 11/3/23 with restrictions. Her restrictions are as follows: one handed activities with the left hand only. No lifting/pushing/pulling/climbing requiring the use of the right hand. No activities on ladders/scaffolding/stairs. No sports. She must work in a clean dry environment. If you have any questions or concerns, or if I can be of further assistance, please do not hesitate to contact me.    Sincerely,            Oscar Bhandari D.O.  Pratibha Norris LPN

## 2023-11-17 LAB
C TRACH RRNA SPEC QL PROBE: NEGATIVE
N GONORRHOEAE, AMPLIFIED DNA: NEGATIVE

## 2023-11-20 DIAGNOSIS — S62.021D: Primary | ICD-10-CM

## 2023-11-22 ENCOUNTER — DOCUMENTATION ONLY (OUTPATIENT)
Dept: ORTHOPEDICS | Facility: CLINIC | Age: 28
End: 2023-11-22
Payer: MEDICAID

## 2023-11-22 NOTE — PROGRESS NOTES
The patient came to the office and stated her cast smelled very badly. She stated she had her brace in her car. She wanted to know if we could remove her cast and place her in the brace. After speaking with Dr. Bhandari, the patient was placed in the cast room and her cast was removed. The patient was placed in her brace. Carmita was told to treat the brace like a cast and only remove it for hygiene. Carmita stated understanding. She was told to follow up as scheduled. She confirmed her appointment for 12/6/23.

## 2023-11-28 ENCOUNTER — HOSPITAL ENCOUNTER (EMERGENCY)
Facility: HOSPITAL | Age: 28
Discharge: HOME | End: 2023-11-28
Attending: EMERGENCY MEDICINE
Payer: MEDICAID

## 2023-11-28 VITALS
RESPIRATION RATE: 18 BRPM | OXYGEN SATURATION: 100 % | SYSTOLIC BLOOD PRESSURE: 99 MMHG | BODY MASS INDEX: 22.08 KG/M2 | DIASTOLIC BLOOD PRESSURE: 46 MMHG | TEMPERATURE: 98 F | HEIGHT: 62 IN | WEIGHT: 120 LBS | HEART RATE: 73 BPM

## 2023-11-28 DIAGNOSIS — A08.4 VIRAL GASTROENTERITIS: Primary | ICD-10-CM

## 2023-11-28 LAB
ALBUMIN SERPL BCP-MCNC: 4.2 G/DL (ref 3.5–5.2)
ALP SERPL-CCNC: 46 U/L (ref 55–135)
ALT SERPL W/O P-5'-P-CCNC: 11 U/L (ref 10–44)
ANION GAP SERPL CALC-SCNC: 11 MMOL/L (ref 8–16)
AST SERPL-CCNC: 17 U/L (ref 10–40)
BASOPHILS # BLD AUTO: 0.04 K/UL (ref 0–0.2)
BASOPHILS NFR BLD: 0.6 % (ref 0–1.9)
BILIRUB SERPL-MCNC: 0.4 MG/DL (ref 0.1–1)
BILIRUB UR QL STRIP: NEGATIVE
BUN SERPL-MCNC: 6 MG/DL (ref 6–20)
CALCIUM SERPL-MCNC: 9.2 MG/DL (ref 8.7–10.5)
CHLORIDE SERPL-SCNC: 105 MMOL/L (ref 95–110)
CLARITY UR: CLEAR
CO2 SERPL-SCNC: 20 MMOL/L (ref 23–29)
COLOR UR: YELLOW
CREAT SERPL-MCNC: 0.6 MG/DL (ref 0.5–1.4)
DIFFERENTIAL METHOD: ABNORMAL
EOSINOPHIL # BLD AUTO: 0.1 K/UL (ref 0–0.5)
EOSINOPHIL NFR BLD: 0.7 % (ref 0–8)
ERYTHROCYTE [DISTWIDTH] IN BLOOD BY AUTOMATED COUNT: 13.2 % (ref 11.5–14.5)
EST. GFR  (NO RACE VARIABLE): >60 ML/MIN/1.73 M^2
GLUCOSE SERPL-MCNC: 81 MG/DL (ref 70–110)
GLUCOSE UR QL STRIP: NEGATIVE
HCT VFR BLD AUTO: 34.1 % (ref 37–48.5)
HCV AB SERPL QL IA: NORMAL
HGB BLD-MCNC: 11.1 G/DL (ref 12–16)
HGB UR QL STRIP: ABNORMAL
HIV 1+2 AB+HIV1 P24 AG SERPL QL IA: NORMAL
IMM GRANULOCYTES # BLD AUTO: 0.01 K/UL (ref 0–0.04)
IMM GRANULOCYTES NFR BLD AUTO: 0.1 % (ref 0–0.5)
KETONES UR QL STRIP: NEGATIVE
LEUKOCYTE ESTERASE UR QL STRIP: NEGATIVE
LIPASE SERPL-CCNC: 20 U/L (ref 4–60)
LYMPHOCYTES # BLD AUTO: 2 K/UL (ref 1–4.8)
LYMPHOCYTES NFR BLD: 27.5 % (ref 18–48)
MCH RBC QN AUTO: 27.7 PG (ref 27–31)
MCHC RBC AUTO-ENTMCNC: 32.6 G/DL (ref 32–36)
MCV RBC AUTO: 85 FL (ref 82–98)
MONOCYTES # BLD AUTO: 0.4 K/UL (ref 0.3–1)
MONOCYTES NFR BLD: 5.3 % (ref 4–15)
NEUTROPHILS # BLD AUTO: 4.7 K/UL (ref 1.8–7.7)
NEUTROPHILS NFR BLD: 65.8 % (ref 38–73)
NITRITE UR QL STRIP: NEGATIVE
NRBC BLD-RTO: 0 /100 WBC
PH UR STRIP: 7 [PH] (ref 5–8)
PLATELET # BLD AUTO: 197 K/UL (ref 150–450)
PMV BLD AUTO: 10.3 FL (ref 9.2–12.9)
POTASSIUM SERPL-SCNC: 3.5 MMOL/L (ref 3.5–5.1)
PROT SERPL-MCNC: 7 G/DL (ref 6–8.4)
PROT UR QL STRIP: NEGATIVE
RBC # BLD AUTO: 4.01 M/UL (ref 4–5.4)
SODIUM SERPL-SCNC: 136 MMOL/L (ref 136–145)
SP GR UR STRIP: 1.01 (ref 1–1.03)
URN SPEC COLLECT METH UR: ABNORMAL
UROBILINOGEN UR STRIP-ACNC: NEGATIVE EU/DL
WBC # BLD AUTO: 7.19 K/UL (ref 3.9–12.7)

## 2023-11-28 PROCEDURE — 96365 THER/PROPH/DIAG IV INF INIT: CPT

## 2023-11-28 PROCEDURE — 83690 ASSAY OF LIPASE: CPT | Performed by: NURSE PRACTITIONER

## 2023-11-28 PROCEDURE — 80053 COMPREHEN METABOLIC PANEL: CPT | Performed by: NURSE PRACTITIONER

## 2023-11-28 PROCEDURE — 86803 HEPATITIS C AB TEST: CPT | Performed by: EMERGENCY MEDICINE

## 2023-11-28 PROCEDURE — 99284 EMERGENCY DEPT VISIT MOD MDM: CPT | Mod: 25

## 2023-11-28 PROCEDURE — 25000003 PHARM REV CODE 250: Performed by: NURSE PRACTITIONER

## 2023-11-28 PROCEDURE — 63600175 PHARM REV CODE 636 W HCPCS: Mod: UD | Performed by: NURSE PRACTITIONER

## 2023-11-28 PROCEDURE — 87389 HIV-1 AG W/HIV-1&-2 AB AG IA: CPT | Performed by: EMERGENCY MEDICINE

## 2023-11-28 PROCEDURE — 85025 COMPLETE CBC W/AUTO DIFF WBC: CPT | Performed by: NURSE PRACTITIONER

## 2023-11-28 PROCEDURE — 81003 URINALYSIS AUTO W/O SCOPE: CPT | Performed by: NURSE PRACTITIONER

## 2023-11-28 RX ORDER — PROMETHAZINE HYDROCHLORIDE 25 MG/1
25 SUPPOSITORY RECTAL EVERY 6 HOURS PRN
Qty: 30 SUPPOSITORY | Refills: 1 | Status: SHIPPED | OUTPATIENT
Start: 2023-11-28 | End: 2023-12-21

## 2023-11-28 RX ORDER — PROMETHAZINE HYDROCHLORIDE 25 MG/1
25 TABLET ORAL EVERY 6 HOURS PRN
Qty: 30 TABLET | Refills: 1 | Status: SHIPPED | OUTPATIENT
Start: 2023-11-28 | End: 2023-12-21

## 2023-11-28 RX ADMIN — PROMETHAZINE HYDROCHLORIDE 12.5 MG: 25 INJECTION INTRAMUSCULAR; INTRAVENOUS at 12:11

## 2023-11-28 RX ADMIN — SODIUM CHLORIDE 1000 ML: 9 INJECTION, SOLUTION INTRAVENOUS at 12:11

## 2023-11-28 NOTE — ED PROVIDER NOTES
Encounter Date: 11/28/2023       History     Chief Complaint   Patient presents with    Vomiting     N/V x 2 weeks, states she is about 7 weeks pregnant and her Dr told her it was too early for her to get zofran, Pt states she needs some relief from the vomiting    Diarrhea     Started today    Chills     Started today     POV the ED with boyfriend.  Patient complains of intermittent vomiting for 2 weeks.  Onset of diarrhea today.  No relief with B 12. States that she is 7 weeks pregnant.  Her pregnancy has been [facility] clinic[facility]  She denies any pregnancy-related complaints.  No vaginal bleeding, no abdominal cramps.  Denies dysuria, no vaginal discharge, no concerns for STD.  States that she does have mild dizziness with standing.  Her last OBGYN office visit was 11/15/23, her next scheduled office visit as planned for 11/30/23. 0/10.  No other complaints.  Declines COVID or any other respiratory testing.  States that she just had these tests and her results were negative    The history is provided by the patient.     Review of patient's allergies indicates:  No Known Allergies  Past Medical History:   Diagnosis Date    Anemia     Herpes simplex virus (HSV) infection     Rubella non-immune status, antepartum      Past Surgical History:   Procedure Laterality Date    DILATION AND CURETTAGE OF UTERUS USING SUCTION N/A 8/4/2021    Procedure: DILATION AND CURETTAGE, UTERUS, USING SUCTION;  Surgeon: Gilbert Rodriguez MD;  Location: [facility] OR;  Service: OB/GYN;  Laterality: N/A;    OPEN REDUCTION AND INTERNAL FIXATION (ORIF) OF FRACTURE OF CARPAL BONE Right 9/28/2023    Procedure: ORIF Scaphoid Right Wrist;  Surgeon: Oscar Bhandari DO;  Location: [facility] OR;  Service: Orthopedics;  Laterality: Right;    staph      staph infection abdomen     Family History   Problem Relation Age of Onset    Hypertension Father     Cancer Mother     Cervical cancer Mother     Breast cancer Neg Hx      Colon cancer Neg Hx     Diabetes Neg Hx     Eclampsia Neg Hx     Stroke Neg Hx     Miscarriages / Stillbirths Neg Hx     Ovarian cancer Neg Hx      Social History     Tobacco Use    Smoking status: Former     Types: Cigarettes    Smokeless tobacco: Never   Substance Use Topics    Alcohol use: Not Currently    Drug use: No     Review of Systems   Constitutional:  Negative for chills and fever.   Respiratory:  Negative for cough and shortness of breath.    Cardiovascular:  Negative for chest pain.   Gastrointestinal:  Positive for diarrhea, nausea and vomiting. Negative for abdominal pain.   Genitourinary:  Negative for dysuria, flank pain, vaginal bleeding, vaginal discharge and vaginal pain.   Musculoskeletal:  Negative for back pain.   Neurological:  Positive for dizziness. Negative for weakness.   All other systems reviewed and are negative.      Physical Exam     Initial Vitals [11/28/23 1223]   BP Pulse Resp Temp SpO2   122/68 79 18 98.1 °F (36.7 °C) 99 %      MAP       --         Physical Exam    Nursing note and vitals reviewed.  Constitutional: She appears well-developed and well-nourished. No distress.   HENT:   Head: Normocephalic and atraumatic.   Mouth/Throat: Oropharynx is clear and moist.   Eyes: EOM are normal. Pupils are equal, round, and reactive to light.   Neck:   Normal range of motion.  Cardiovascular:  Normal rate and regular rhythm.           Pulmonary/Chest: Breath sounds normal. No respiratory distress.   Abdominal: Abdomen is soft. Bowel sounds are normal. She exhibits no distension. There is no abdominal tenderness.   Zero pain, negative McBurney's point, Dia's sign   Musculoskeletal:         General: Normal range of motion.      Cervical back: Normal range of motion.     Neurological: She is alert and oriented to person, place, and time. She has normal strength. GCS score is 15. GCS eye subscore is 4. GCS verbal subscore is 5. GCS motor subscore is 6.   Skin: Skin is warm. Capillary  refill takes 2 to 3 seconds.   Psychiatric: She has a normal mood and affect. Thought content normal.         ED Course   Procedures  Labs Reviewed   CBC W/ AUTO DIFFERENTIAL - Abnormal; Notable for the following components:       Result Value    Hemoglobin 11.1 (*)     Hematocrit 34.1 (*)     All other components within normal limits    Narrative:     Release to patient->Immediate   COMPREHENSIVE METABOLIC PANEL - Abnormal; Notable for the following components:    CO2 20 (*)     Alkaline Phosphatase 46 (*)     All other components within normal limits    Narrative:     Release to patient->Immediate   URINALYSIS, REFLEX TO URINE CULTURE - Abnormal; Notable for the following components:    Occult Blood UA Trace (*)     All other components within normal limits    Narrative:     Preferred Collection Type->Urine, Clean Catch  Specimen Source->Urine   LIPASE    Narrative:     Release to patient->Immediate   HIV 1 / 2 ANTIBODY   HEPATITIS C ANTIBODY          Imaging Results    None          Medications   sodium chloride 0.9% bolus 1,000 mL 1,000 mL (0 mLs Intravenous Stopped 11/28/23 1411)   promethazine (PHENERGAN) 12.5 mg in dextrose 5 % (D5W) 50 mL IVPB (0 mg Intravenous Stopped 11/28/23 1411)     Medical Decision Making  For evaluation of vomiting and diarrhea.  States she was 7 weeks pregnant.  Denies any pregnancy-related complaints.  She has established prenatal care.  Lab work ordered, disposition pending  Differentials including but not limited to dehydration, volume depletion, abnormal electrolytes, anemia, UTI, gastroenteritis, viral illness, bacterial illness.  Patient will be discharged home, diagnosis viral gastroenteritis.  Medicated with normal saline 1 L fluid bolus, declines additional fluid bolus.  The boyfriend has to leave to go to work.  Prescriptions for home use.  Medicated with Phenergan 12.5 IV.  Tolerates fluids.  States that she feels better.  No acute finding on lab work today requiring  additional testing or admission.  She was to follow up with her OBGYN office to return as needed.  Work note given.  She agrees with plan of care    Amount and/or Complexity of Data Reviewed  Labs: ordered. Decision-making details documented in ED Course.    Risk  OTC drugs.  Prescription drug management.               ED Course as of 11/28/23 1444   Tue Nov 28, 2023   1419    Urinalysis, Reflex to Urine Culture Urine, Clean Catch    Final result 11/28 1256    Occult Blood UA Trace   Comprehensive Metabolic Panel (CMP)    Final result 11/28 1256    CO2 20  ALP 46  Complete Blood Count (CBC)                  Final result 11/28 1256    Hemoglobin 11.1  Hematocrit 34.1          [CP]      ED Course User Index  [CP] Cherie Membreno NP                           Clinical Impression:  Final diagnoses:  [A08.4] Viral gastroenteritis (Primary)          ED Disposition Condition    Discharge Stable          ED Prescriptions       Medication Sig Dispense Start Date End Date Auth. Provider    promethazine (PHENERGAN) 25 MG tablet Take 1 tablet (25 mg total) by mouth every 6 (six) hours as needed for Nausea. 30 tablet 11/28/2023 -- Cherie Membreno NP    promethazine (PHENERGAN) 25 MG suppository Place 1 suppository (25 mg total) rectally every 6 (six) hours as needed for Nausea. 30 suppository 11/28/2023 -- Cherie Membreno NP          Follow-up Information       Follow up With Specialties Details Why Contact Info    Your OBGYN office  Call in 3 days               Cherie Membreno NP  11/28/23 7203

## 2023-11-28 NOTE — DISCHARGE INSTRUCTIONS
Rest, increase fluids, lots of water and liquids.  Clear liquids for 24 hours then gradually restart a bland solid diet.  Follow up with your OBGYN clinic in 2-3 days, call and advised of ED visit.  Continue with prenatal care per your GYN office. Return as needed

## 2023-11-28 NOTE — Clinical Note
"Tosin"Ashlee Mckay was seen and treated in our emergency department on 11/28/2023.  She may return to work on 12/01/2023.       If you have any questions or concerns, please don't hesitate to call.      Cherie Membreno NP"

## 2023-11-28 NOTE — ED NOTES
11/28/23 1408 11/28/23 1410 11/28/23 1412   Vital Signs   Pulse 64 65 73   Resp 18 16 18   SpO2 99 % 100 % 100 %   Device (Oxygen Therapy) room air room air room air   BP (!) 107/53 (!) 98/59 (!) 99/46   MAP (mmHg) 72 66 62   BP Location Left arm Left arm Left arm   BP Method Automatic Automatic Automatic   Patient Position Lying Sitting Standing     Pt was not sympomatic during the procedure.

## 2023-12-12 ENCOUNTER — HOSPITAL ENCOUNTER (EMERGENCY)
Facility: HOSPITAL | Age: 28
Discharge: HOME | End: 2023-12-13
Attending: EMERGENCY MEDICINE
Payer: MEDICAID

## 2023-12-12 DIAGNOSIS — R55 SYNCOPE: Primary | ICD-10-CM

## 2023-12-12 LAB
ALBUMIN SERPL BCP-MCNC: 3.7 G/DL (ref 3.5–5.2)
ALP SERPL-CCNC: 48 U/L (ref 55–135)
ALT SERPL W/O P-5'-P-CCNC: 10 U/L (ref 10–44)
ANION GAP SERPL CALC-SCNC: 10 MMOL/L (ref 8–16)
AST SERPL-CCNC: 16 U/L (ref 10–40)
BACTERIA #/AREA URNS HPF: NORMAL /HPF
BASOPHILS # BLD AUTO: 0.08 K/UL (ref 0–0.2)
BASOPHILS NFR BLD: 0.8 % (ref 0–1.9)
BILIRUB SERPL-MCNC: 0.3 MG/DL (ref 0.1–1)
BILIRUB UR QL STRIP: NEGATIVE
BUN SERPL-MCNC: 8 MG/DL (ref 6–20)
CALCIUM SERPL-MCNC: 9.2 MG/DL (ref 8.7–10.5)
CHLORIDE SERPL-SCNC: 107 MMOL/L (ref 95–110)
CLARITY UR: CLEAR
CO2 SERPL-SCNC: 22 MMOL/L (ref 23–29)
COLOR UR: YELLOW
CREAT SERPL-MCNC: 0.7 MG/DL (ref 0.5–1.4)
DIFFERENTIAL METHOD: ABNORMAL
EOSINOPHIL # BLD AUTO: 0.1 K/UL (ref 0–0.5)
EOSINOPHIL NFR BLD: 0.9 % (ref 0–8)
ERYTHROCYTE [DISTWIDTH] IN BLOOD BY AUTOMATED COUNT: 13.1 % (ref 11.5–14.5)
EST. GFR  (NO RACE VARIABLE): >60 ML/MIN/1.73 M^2
GLUCOSE SERPL-MCNC: 90 MG/DL (ref 70–110)
GLUCOSE UR QL STRIP: NEGATIVE
HCT VFR BLD AUTO: 35.3 % (ref 37–48.5)
HGB BLD-MCNC: 11.8 G/DL (ref 12–16)
HGB UR QL STRIP: NEGATIVE
IMM GRANULOCYTES # BLD AUTO: 0.02 K/UL (ref 0–0.04)
IMM GRANULOCYTES NFR BLD AUTO: 0.2 % (ref 0–0.5)
KETONES UR QL STRIP: NEGATIVE
LEUKOCYTE ESTERASE UR QL STRIP: ABNORMAL
LYMPHOCYTES # BLD AUTO: 3.4 K/UL (ref 1–4.8)
LYMPHOCYTES NFR BLD: 33.6 % (ref 18–48)
MAGNESIUM SERPL-MCNC: 1.8 MG/DL (ref 1.6–2.6)
MCH RBC QN AUTO: 28.2 PG (ref 27–31)
MCHC RBC AUTO-ENTMCNC: 33.4 G/DL (ref 32–36)
MCV RBC AUTO: 84 FL (ref 82–98)
MICROSCOPIC COMMENT: NORMAL
MONOCYTES # BLD AUTO: 0.6 K/UL (ref 0.3–1)
MONOCYTES NFR BLD: 6.3 % (ref 4–15)
NEUTROPHILS # BLD AUTO: 5.9 K/UL (ref 1.8–7.7)
NEUTROPHILS NFR BLD: 58.2 % (ref 38–73)
NITRITE UR QL STRIP: NEGATIVE
NRBC BLD-RTO: 0 /100 WBC
PH UR STRIP: 7 [PH] (ref 5–8)
PLATELET # BLD AUTO: 206 K/UL (ref 150–450)
PMV BLD AUTO: 9.8 FL (ref 9.2–12.9)
POTASSIUM SERPL-SCNC: 4 MMOL/L (ref 3.5–5.1)
PROT SERPL-MCNC: 6.8 G/DL (ref 6–8.4)
PROT UR QL STRIP: NEGATIVE
RBC # BLD AUTO: 4.18 M/UL (ref 4–5.4)
SODIUM SERPL-SCNC: 139 MMOL/L (ref 136–145)
SP GR UR STRIP: 1.02 (ref 1–1.03)
SQUAMOUS #/AREA URNS HPF: 2 /HPF
URN SPEC COLLECT METH UR: ABNORMAL
UROBILINOGEN UR STRIP-ACNC: 1 EU/DL
WBC # BLD AUTO: 10.09 K/UL (ref 3.9–12.7)
WBC #/AREA URNS HPF: 3 /HPF (ref 0–5)

## 2023-12-12 PROCEDURE — 25000003 PHARM REV CODE 250: Performed by: EMERGENCY MEDICINE

## 2023-12-12 PROCEDURE — 83735 ASSAY OF MAGNESIUM: CPT | Performed by: EMERGENCY MEDICINE

## 2023-12-12 PROCEDURE — 96361 HYDRATE IV INFUSION ADD-ON: CPT

## 2023-12-12 PROCEDURE — 93005 ELECTROCARDIOGRAM TRACING: CPT

## 2023-12-12 PROCEDURE — 85025 COMPLETE CBC W/AUTO DIFF WBC: CPT | Performed by: EMERGENCY MEDICINE

## 2023-12-12 PROCEDURE — 99284 EMERGENCY DEPT VISIT MOD MDM: CPT | Mod: 25

## 2023-12-12 PROCEDURE — 81000 URINALYSIS NONAUTO W/SCOPE: CPT | Performed by: EMERGENCY MEDICINE

## 2023-12-12 PROCEDURE — 96365 THER/PROPH/DIAG IV INF INIT: CPT

## 2023-12-12 PROCEDURE — 63600175 PHARM REV CODE 636 W HCPCS: Mod: UD | Performed by: EMERGENCY MEDICINE

## 2023-12-12 PROCEDURE — 93010 ELECTROCARDIOGRAM REPORT: CPT | Mod: ,,, | Performed by: INTERNAL MEDICINE

## 2023-12-12 PROCEDURE — 80053 COMPREHEN METABOLIC PANEL: CPT | Performed by: EMERGENCY MEDICINE

## 2023-12-12 PROCEDURE — 93010 EKG 12-LEAD: ICD-10-PCS | Mod: ,,, | Performed by: INTERNAL MEDICINE

## 2023-12-12 RX ADMIN — SODIUM CHLORIDE 1000 ML: 9 INJECTION, SOLUTION INTRAVENOUS at 10:12

## 2023-12-12 RX ADMIN — PROMETHAZINE HYDROCHLORIDE 12.5 MG: 25 INJECTION INTRAMUSCULAR; INTRAVENOUS at 11:12

## 2023-12-12 NOTE — Clinical Note
"Tosin Knott" Nely was seen and treated in our emergency department on 12/12/2023.  She may return to work on 12/15/2023.       If you have any questions or concerns, please don't hesitate to call.      Ascencion TODD    "

## 2023-12-13 VITALS
BODY MASS INDEX: 22.08 KG/M2 | HEIGHT: 62 IN | HEART RATE: 74 BPM | DIASTOLIC BLOOD PRESSURE: 58 MMHG | TEMPERATURE: 99 F | SYSTOLIC BLOOD PRESSURE: 107 MMHG | OXYGEN SATURATION: 99 % | WEIGHT: 120 LBS | RESPIRATION RATE: 16 BRPM

## 2023-12-13 NOTE — DISCHARGE INSTRUCTIONS
Please use the Phenergan prescriptions that have already been provided for you at the pharmacy as this seemed to be help with your nausea more than anything today.  Please drink plenty of fluids eat bland meals for the next few days and please follow up with your OB gyn.  Return to the emergency department with any new or worsening symptoms.

## 2023-12-13 NOTE — ED PROVIDER NOTES
Encounter Date: 12/12/2023       History     Chief Complaint   Patient presents with    Pre Syncope     Pt reports syncopal episode earlier today. Hx of same during pregnancy.      28-year-old pregnant female approximately 9-10 weeks pregnant presents with an episode of syncope.  Patient states she has had episodes of near-syncope with her previous pregnancies.  But she is never fully passed out before.  She has had an ultrasound confirming IUP for this pregnancy.  She has establish care with OB gyn.  She has multiple antiemetic medications at home.  States that she has had nausea vomiting throughout the pregnancy and over the past 2-3 days she has been unable to keep anything down in his Zofran stopped working.  She denies any abdominal pain.  No chest pain or shortness of breath.    The history is provided by the patient. No  was used.     Review of patient's allergies indicates:  No Known Allergies  Past Medical History:   Diagnosis Date    Anemia     Herpes simplex virus (HSV) infection     Rubella non-immune status, antepartum      Past Surgical History:   Procedure Laterality Date    DILATION AND CURETTAGE OF UTERUS USING SUCTION N/A 8/4/2021    Procedure: DILATION AND CURETTAGE, UTERUS, USING SUCTION;  Surgeon: Gilbert Rodriguez MD;  Location: [facility] OR;  Service: OB/GYN;  Laterality: N/A;    OPEN REDUCTION AND INTERNAL FIXATION (ORIF) OF FRACTURE OF CARPAL BONE Right 9/28/2023    Procedure: ORIF Scaphoid Right Wrist;  Surgeon: Oscar Bhandari DO;  Location: [facility] OR;  Service: Orthopedics;  Laterality: Right;    staph      staph infection abdomen     Family History   Problem Relation Age of Onset    Hypertension Father     Cancer Mother     Cervical cancer Mother     Breast cancer Neg Hx     Colon cancer Neg Hx     Diabetes Neg Hx     Eclampsia Neg Hx     Stroke Neg Hx     Miscarriages / Stillbirths Neg Hx     Ovarian cancer Neg Hx      Social History     Tobacco Use    Smoking status:  Former     Types: Cigarettes    Smokeless tobacco: Never   Substance Use Topics    Alcohol use: Not Currently    Drug use: No     Review of Systems   Constitutional:  Negative for fever.   HENT:  Negative for sore throat.    Respiratory:  Negative for shortness of breath.    Cardiovascular:  Negative for chest pain.   Gastrointestinal:  Positive for nausea and vomiting.   Genitourinary:  Negative for dysuria.   Musculoskeletal:  Negative for back pain.   Skin:  Negative for rash.   Neurological:  Positive for syncope. Negative for weakness.   Hematological:  Does not bruise/bleed easily.   All other systems reviewed and are negative.      Physical Exam     Initial Vitals   BP Pulse Resp Temp SpO2   12/12/23 2144 12/12/23 2144 12/12/23 2144 12/13/23 0100 12/12/23 2144   118/62 81 18 98.6 °F (37 °C) 100 %      MAP       --                Physical Exam    Nursing note and vitals reviewed.  Constitutional: She appears well-developed and well-nourished.   HENT:   Head: Normocephalic and atraumatic.   Dry mucous membranes   Eyes: EOM are normal. Pupils are equal, round, and reactive to light.   Cardiovascular:  Normal rate and regular rhythm.           Pulmonary/Chest: Breath sounds normal.   Abdominal: Abdomen is soft. She exhibits no distension. There is no abdominal tenderness.   Musculoskeletal:         General: Normal range of motion.     Neurological: She is alert and oriented to person, place, and time. GCS score is 15. GCS eye subscore is 4. GCS verbal subscore is 5. GCS motor subscore is 6.         ED Course   Procedures  Labs Reviewed   CBC W/ AUTO DIFFERENTIAL - Abnormal; Notable for the following components:       Result Value    Hemoglobin 11.8 (*)     Hematocrit 35.3 (*)     All other components within normal limits   COMPREHENSIVE METABOLIC PANEL - Abnormal; Notable for the following components:    CO2 22 (*)     Alkaline Phosphatase 48 (*)     All other components within normal limits   URINALYSIS, REFLEX  TO URINE CULTURE - Abnormal; Notable for the following components:    Leukocytes, UA 1+ (*)     All other components within normal limits    Narrative:     Preferred Collection Type->Urine, Clean Catch  Specimen Source->Urine   MAGNESIUM   URINALYSIS MICROSCOPIC    Narrative:     Preferred Collection Type->Urine, Clean Catch  Specimen Source->Urine     EKG Readings: (Independently Interpreted)   EKG done at 10:46 p.m. shows a rate of 66, WI interval 118, QRS duration 82, .  My interpretation of the EKGs that this is a normal sinus rhythm without any findings concerning for acute ischemia or electrical abnormality at this time.       Imaging Results    None          Medications   sodium chloride 0.9% bolus 1,000 mL 1,000 mL (0 mLs Intravenous Stopped 12/12/23 2322)   promethazine (PHENERGAN) 12.5 mg in dextrose 5 % (D5W) 50 mL IVPB (0 mg Intravenous Stopped 12/13/23 0000)     Medical Decision Making  Differential diagnosis includes dehydration, hypoglycemia, infection, cardiac dysrhythmia    Labs without significant abnormality, no evidence of infection in her urine.  Patient was hydrated and given Phenergan for nausea.  She tolerated p.o. here and feels well enough to go home.  She ambulated without feeling lightheaded or dizzy.  She will follow up with her OB gyn.    Amount and/or Complexity of Data Reviewed  Labs: ordered. Decision-making details documented in ED Course.                                      Clinical Impression:  Final diagnoses:  [R55] Syncope (Primary)          ED Disposition Condition    Discharge Stable          ED Prescriptions    None       Follow-up Information    None          Chio Smith MD  12/13/23 5560

## 2023-12-13 NOTE — ED NOTES
10 WEEKS  lmp 10/4  edc 7/15/24  + syncope earlier today at grocery store.   States it happened with her last pregnancy.

## 2024-03-04 ENCOUNTER — HOSPITAL ENCOUNTER (EMERGENCY)
Facility: HOSPITAL | Age: 29
Discharge: HOME | End: 2024-03-04
Attending: EMERGENCY MEDICINE
Payer: MEDICAID

## 2024-03-04 VITALS
SYSTOLIC BLOOD PRESSURE: 113 MMHG | HEART RATE: 100 BPM | DIASTOLIC BLOOD PRESSURE: 64 MMHG | RESPIRATION RATE: 18 BRPM | HEIGHT: 62 IN | WEIGHT: 132 LBS | TEMPERATURE: 98 F | BODY MASS INDEX: 24.29 KG/M2 | OXYGEN SATURATION: 99 %

## 2024-03-04 DIAGNOSIS — J02.9 VIRAL PHARYNGITIS: Primary | ICD-10-CM

## 2024-03-04 LAB
GROUP A STREP, MOLECULAR: NEGATIVE
INFLUENZA A, MOLECULAR: NEGATIVE
INFLUENZA B, MOLECULAR: NEGATIVE
SARS-COV-2 RDRP RESP QL NAA+PROBE: NEGATIVE
SPECIMEN SOURCE: NORMAL

## 2024-03-04 PROCEDURE — U0002 COVID-19 LAB TEST NON-CDC: HCPCS | Performed by: EMERGENCY MEDICINE

## 2024-03-04 PROCEDURE — 87502 INFLUENZA DNA AMP PROBE: CPT | Performed by: EMERGENCY MEDICINE

## 2024-03-04 PROCEDURE — 87651 STREP A DNA AMP PROBE: CPT | Performed by: EMERGENCY MEDICINE

## 2024-03-04 PROCEDURE — 99282 EMERGENCY DEPT VISIT SF MDM: CPT

## 2024-03-04 NOTE — Clinical Note
"Tosin Knott" Nely was seen and treated in our emergency department on 3/4/2024.  She may return to work on 03/05/2024.       If you have any questions or concerns, please don't hesitate to call.       RN    "

## 2024-03-04 NOTE — ED PROVIDER NOTES
Encounter Date: 3/4/2024       History     Chief Complaint   Patient presents with    Sore Throat     Patient states started with a sore throat and fever last night.     28-year-old female, here complaining of sore throat and subjective fever which started late last night.  This morning.  Denies any known sick contacts.  She has not tried any medications for the symptoms.  Nothing she does makes her symptoms any better or worse.      Review of patient's allergies indicates:  No Known Allergies  Past Medical History:   Diagnosis Date    Anemia     Herpes simplex virus (HSV) infection     Rubella non-immune status, antepartum      Past Surgical History:   Procedure Laterality Date    DILATION AND CURETTAGE OF UTERUS USING SUCTION N/A 8/4/2021    Procedure: DILATION AND CURETTAGE, UTERUS, USING SUCTION;  Surgeon: Gilbert Rodriguez MD;  Location: [facility] OR;  Service: OB/GYN;  Laterality: N/A;    OPEN REDUCTION AND INTERNAL FIXATION (ORIF) OF FRACTURE OF CARPAL BONE Right 9/28/2023    Procedure: ORIF Scaphoid Right Wrist;  Surgeon: Oscar Bhandari DO;  Location: [facility] OR;  Service: Orthopedics;  Laterality: Right;    staph      staph infection abdomen     Family History   Problem Relation Age of Onset    Hypertension Father     Cancer Mother     Cervical cancer Mother     Breast cancer Neg Hx     Colon cancer Neg Hx     Diabetes Neg Hx     Eclampsia Neg Hx     Stroke Neg Hx     Miscarriages / Stillbirths Neg Hx     Ovarian cancer Neg Hx      Social History     Tobacco Use    Smoking status: Former     Types: Cigarettes    Smokeless tobacco: Never   Substance Use Topics    Alcohol use: Not Currently    Drug use: No     Review of Systems   Constitutional:  Positive for fever.   HENT:  Positive for sore throat.    Eyes: Negative.    Respiratory: Negative.     Cardiovascular: Negative.    Gastrointestinal: Negative.    Endocrine: Negative.    Genitourinary: Negative.    Musculoskeletal: Negative.    Skin: Negative.     Neurological: Negative.    Psychiatric/Behavioral: Negative.         Physical Exam     Initial Vitals [03/04/24 0730]   BP Pulse Resp Temp SpO2   113/64 100 18 98 °F (36.7 °C) 99 %      MAP       --         Physical Exam    Constitutional: She appears well-developed and well-nourished. She is not diaphoretic. No distress.   HENT:   Head: Normocephalic and atraumatic.   Right Ear: External ear normal.   Left Ear: External ear normal.   Nose: Nose normal.   Posterior oropharynx somewhat erythematous.  No edema, no exudates   Eyes: Conjunctivae and EOM are normal. Pupils are equal, round, and reactive to light. No scleral icterus.   Neck: Neck supple. No JVD present.   Normal range of motion.  Cardiovascular:  Normal rate, regular rhythm, normal heart sounds and intact distal pulses.           No murmur heard.  Pulmonary/Chest: Breath sounds normal. No stridor. No respiratory distress. She has no wheezes.   Abdominal: Abdomen is soft. Bowel sounds are normal. She exhibits no distension. There is no abdominal tenderness.   Musculoskeletal:         General: No tenderness or edema. Normal range of motion.      Cervical back: Normal range of motion and neck supple.     Neurological: She is alert and oriented to person, place, and time. She has normal strength. No cranial nerve deficit or sensory deficit. GCS score is 15. GCS eye subscore is 4. GCS verbal subscore is 5. GCS motor subscore is 6.   Skin: Skin is dry. Capillary refill takes less than 2 seconds. No erythema.   Psychiatric: She has a normal mood and affect. Her behavior is normal.         ED Course   Procedures  Labs Reviewed   INFLUENZA A & B BY MOLECULAR   GROUP A STREP, MOLECULAR   SARS-COV-2 RNA AMPLIFICATION, QUAL    Narrative:     Is the patient symptomatic?->Yes          Imaging Results    None          Medications - No data to display  Medical Decision Making  Differential includes COVID-19, influenza, other viral illness, tracheitis, etc.    Patient  negative for COVID, strep, and flu.  Likely viral pharyngitis.  Instructions for home care given.  Patient will return here as needed or if worse in any way.                                      Clinical Impression:  Final diagnoses:  [J02.9] Viral pharyngitis (Primary)          ED Disposition Condition    Discharge Stable          ED Prescriptions    None       Follow-up Information       Follow up With Specialties Details Why Contact Info    Your primary care provider  Call today      [facility] Emergency Dept Emergency Medicine  As needed, If symptoms worsen [address] [facility] [address] [phone]             Lew Morgan MD  03/04/24 0829

## 2024-03-04 NOTE — DISCHARGE INSTRUCTIONS
Your labs today were normal.  No evidence of strep throat, COVID, or flu.  Your sore throat is likely caused by a virus.  Take over-the-counter medications including Tylenol, Motrin, etc., and use warm saltwater gargles or Chloraseptic spray/lozenges for sore throat pain.  Follow-up with your doctor, return here as needed or if worse in any way.

## 2024-04-26 ENCOUNTER — LAB VISIT (OUTPATIENT)
Dept: LAB | Facility: HOSPITAL | Age: 29
End: 2024-04-26
Attending: NURSE PRACTITIONER
Payer: MEDICAID

## 2024-04-26 DIAGNOSIS — Z87.51 HISTORY OF PRETERM DELIVERY: ICD-10-CM

## 2024-04-26 DIAGNOSIS — Z3A.28 28 WEEKS GESTATION OF PREGNANCY: ICD-10-CM

## 2024-04-26 LAB
BASOPHILS # BLD AUTO: 0.07 K/UL (ref 0–0.2)
BASOPHILS NFR BLD: 1 % (ref 0–1.9)
DIFFERENTIAL METHOD BLD: ABNORMAL
EOSINOPHIL # BLD AUTO: 0.1 K/UL (ref 0–0.5)
EOSINOPHIL NFR BLD: 1.4 % (ref 0–8)
ERYTHROCYTE [DISTWIDTH] IN BLOOD BY AUTOMATED COUNT: 12 % (ref 11.5–14.5)
GLUCOSE SERPL-MCNC: 76 MG/DL (ref 70–140)
HCT VFR BLD AUTO: 28.7 % (ref 37–48.5)
HGB BLD-MCNC: 9.6 G/DL (ref 12–16)
HIV 1+2 AB+HIV1 P24 AG SERPL QL IA: NORMAL
IMM GRANULOCYTES # BLD AUTO: 0.02 K/UL (ref 0–0.04)
IMM GRANULOCYTES NFR BLD AUTO: 0.3 % (ref 0–0.5)
LYMPHOCYTES # BLD AUTO: 1.7 K/UL (ref 1–4.8)
LYMPHOCYTES NFR BLD: 23.1 % (ref 18–48)
MCH RBC QN AUTO: 28.9 PG (ref 27–31)
MCHC RBC AUTO-ENTMCNC: 33.4 G/DL (ref 32–36)
MCV RBC AUTO: 86 FL (ref 82–98)
MONOCYTES # BLD AUTO: 0.5 K/UL (ref 0.3–1)
MONOCYTES NFR BLD: 6.9 % (ref 4–15)
NEUTROPHILS # BLD AUTO: 5 K/UL (ref 1.8–7.7)
NEUTROPHILS NFR BLD: 67.3 % (ref 38–73)
NRBC BLD-RTO: 0 /100 WBC
PLATELET # BLD AUTO: 194 K/UL (ref 150–450)
PMV BLD AUTO: 9.7 FL (ref 9.2–12.9)
RBC # BLD AUTO: 3.32 M/UL (ref 4–5.4)
WBC # BLD AUTO: 7.35 K/UL (ref 3.9–12.7)

## 2024-04-26 PROCEDURE — 87389 HIV-1 AG W/HIV-1&-2 AB AG IA: CPT | Performed by: NURSE PRACTITIONER

## 2024-04-26 PROCEDURE — 82950 GLUCOSE TEST: CPT | Performed by: NURSE PRACTITIONER

## 2024-04-26 PROCEDURE — 85025 COMPLETE CBC W/AUTO DIFF WBC: CPT | Performed by: NURSE PRACTITIONER

## 2024-04-26 PROCEDURE — 36415 COLL VENOUS BLD VENIPUNCTURE: CPT | Performed by: NURSE PRACTITIONER

## 2024-04-26 PROCEDURE — 86592 SYPHILIS TEST NON-TREP QUAL: CPT | Performed by: NURSE PRACTITIONER

## 2024-04-27 LAB — RPR SER QL: NORMAL

## 2024-05-13 ENCOUNTER — HOSPITAL ENCOUNTER (OUTPATIENT)
Facility: HOSPITAL | Age: 29
Discharge: HOME | End: 2024-05-13
Attending: OBSTETRICS & GYNECOLOGY | Admitting: OBSTETRICS & GYNECOLOGY
Payer: MEDICAID

## 2024-05-13 VITALS
RESPIRATION RATE: 18 BRPM | SYSTOLIC BLOOD PRESSURE: 113 MMHG | TEMPERATURE: 98 F | DIASTOLIC BLOOD PRESSURE: 65 MMHG | HEART RATE: 78 BPM

## 2024-05-13 DIAGNOSIS — Z34.90 PREGNANCY: ICD-10-CM

## 2024-05-13 LAB
CTP QC/QA: YES
RUPTURE OF MEMBRANE: NEGATIVE

## 2024-05-13 PROCEDURE — 99211 OFF/OP EST MAY X REQ PHY/QHP: CPT

## 2024-05-14 NOTE — NURSING
[facility]  Department of Obstetrics and Gynecology  Labor & Delivery Triage Assessment    PATIENT NAME: Tosin Mckay  MRN: [id number]  TODAY'S DATE: 2024    CHIEF COMPLAINT: Rupture of Membranes      OB History    Para Term  AB Living   6 3 0 1 2 3   SAB IAB Ectopic Multiple Live Births   1 0 0 0 3      # Outcome Date GA Lbr Colby/2nd Weight Sex Type Anes PTL Lv   6 Current            5  18 36w4d  2.56 kg (5 lb 10.3 oz) F Vag-Spont EPI Y JIM      Name: DEMETRICE,LUIS ALFREDO AGUSTIN      Apgar1: 8  Apgar5: 9   4 Para 17   3.09 kg (6 lb 13 oz) F Vag-Spont   JIM   3 Para 14   2.92 kg (6 lb 7 oz) M Vag-Spont   JIM   2 SAB            1 AB              Past Medical History:   Diagnosis Date    Anemia     Herpes simplex virus (HSV) infection     Rubella non-immune status, antepartum      Past Surgical History:   Procedure Laterality Date    DILATION AND CURETTAGE OF UTERUS USING SUCTION N/A 2021    Procedure: DILATION AND CURETTAGE, UTERUS, USING SUCTION;  Surgeon: Gilbert Rodriguez MD;  Location: [facility] OR;  Service: OB/GYN;  Laterality: N/A;    OPEN REDUCTION AND INTERNAL FIXATION (ORIF) OF FRACTURE OF CARPAL BONE Right 2023    Procedure: ORIF Scaphoid Right Wrist;  Surgeon: Oscar Bhandari DO;  Location: [facility] OR;  Service: Orthopedics;  Laterality: Right;    staph      staph infection abdomen         VITAL SIGNS - ABNORMAL VITALS INCLUDE TEMP >100.4,RR <12 or >26, SUSTAINED MATERNAL PULSE <60 or >120     VITAL SIGNS (Most Recent)       VITAL SIGNS     normal  HEADACHE    no     VOMITING    no  VISUAL DISTURBANCES  no  EPIGASTRIC PAIN        no  PROTEINURIA 2+ or MORE             no   EDEMA FACE/EXTREMITIES            no    FETAL MOVEMENT     FETAL MOVEMENT: Active  FETAL HEART RATE BASELINE =  130  normal  FETAL HEART RATE VARIABILITY:  Moderate  FETAL HEART RATE ACCELERATIONS FOR GESTATIONAL AGE: present  FETAL HEART RATE DECELERATIONS: none    ABDOMINAL  PAIN/CRAMPING/CONTRACTIONS     Patient is not complaining of abdominal pain/cramping/contractions.    RUPTURE OF MEMBRANES OR LEAKING OF AMNIOTIC FLUID     Patient reports leaking of amniotic fluid and is clear in color and reporting amount as moderate.   ROM plus collected is Negative. GBS status is Negative.    VAGINAL BLEEDING     Patient denies vaginal bleeding.    VAGINAL EXAM       VAGINAL EXAM DEFERRED DUE TO:  Not in Labor    PAIN PRESENT ON ARRIVAL     PAIN SCALE (0-10):  0      Interventions     ROM plus collected      PATIENT DISPOSITION     Discharged Home      Dr. Alba notified at 6666 of the above assessment. Orders to d/c home    Daily Brown, RN  [facility]  05/13/2024

## 2024-05-29 ENCOUNTER — HOSPITAL ENCOUNTER (OUTPATIENT)
Facility: HOSPITAL | Age: 29
Discharge: HOME | End: 2024-05-29
Attending: GENERAL PRACTICE | Admitting: GENERAL PRACTICE
Payer: MEDICAID

## 2024-05-29 VITALS
TEMPERATURE: 98 F | HEART RATE: 95 BPM | DIASTOLIC BLOOD PRESSURE: 58 MMHG | OXYGEN SATURATION: 97 % | SYSTOLIC BLOOD PRESSURE: 111 MMHG

## 2024-05-29 DIAGNOSIS — R10.9 ABDOMINAL PAIN: ICD-10-CM

## 2024-05-29 LAB
BACTERIA #/AREA URNS HPF: ABNORMAL /HPF
BILIRUB UR QL STRIP: NEGATIVE
CLARITY UR: ABNORMAL
COLOR UR: YELLOW
FIBRONECTIN FETAL SPEC QL: NEGATIVE
GLUCOSE UR QL STRIP: NEGATIVE
HGB UR QL STRIP: NEGATIVE
KETONES UR QL STRIP: NEGATIVE
LEUKOCYTE ESTERASE UR QL STRIP: ABNORMAL
MICROSCOPIC COMMENT: ABNORMAL
NITRITE UR QL STRIP: NEGATIVE
PH UR STRIP: 7 [PH] (ref 5–8)
PROT UR QL STRIP: ABNORMAL
RBC #/AREA URNS HPF: 1 /HPF (ref 0–4)
SP GR UR STRIP: 1.02 (ref 1–1.03)
SQUAMOUS #/AREA URNS HPF: 5 /HPF
URN SPEC COLLECT METH UR: ABNORMAL
UROBILINOGEN UR STRIP-ACNC: ABNORMAL EU/DL
WBC #/AREA URNS HPF: 10 /HPF (ref 0–5)

## 2024-05-29 PROCEDURE — 59025 FETAL NON-STRESS TEST: CPT | Mod: 26,,, | Performed by: GENERAL PRACTICE

## 2024-05-29 PROCEDURE — 82731 ASSAY OF FETAL FIBRONECTIN: CPT | Performed by: GENERAL PRACTICE

## 2024-05-29 PROCEDURE — 87086 URINE CULTURE/COLONY COUNT: CPT | Performed by: GENERAL PRACTICE

## 2024-05-29 PROCEDURE — 81001 URINALYSIS AUTO W/SCOPE: CPT | Performed by: GENERAL PRACTICE

## 2024-05-29 NOTE — NURSING
[facility]  Department of Obstetrics and Gynecology  Labor & Delivery Triage Assessment    PATIENT NAME: Tosin Mckay  MRN: [id number]  TODAY'S DATE: 2024    CHIEF COMPLAINT: No chief complaint on file.      OB History    Para Term  AB Living   7 4 1 1 2 3   SAB IAB Ectopic Multiple Live Births   0 0 0 0 3      # Outcome Date GA Lbr Colby/2nd Weight Sex Type Anes PTL Lv   7 Current            6  18 36w4d  2.56 kg (5 lb 10.3 oz) F Vag-Spont EPI Y JIM      Name: DEMETRICE,LUIS ALFREDO AGUSTIN      Apgar1: 8  Apgar5: 9   5 Para 17   3.09 kg (6 lb 13 oz) F Vag-Spont   JIM   4 Para 14   2.92 kg (6 lb 7 oz) M Vag-Spont   JIM   3 Term            2 AB            1 AB              Past Medical History:   Diagnosis Date    Anemia     Herpes simplex virus (HSV) infection     Rubella non-immune status, antepartum      Past Surgical History:   Procedure Laterality Date    DILATION AND CURETTAGE OF UTERUS USING SUCTION N/A 2021    Procedure: DILATION AND CURETTAGE, UTERUS, USING SUCTION;  Surgeon: Gilbert Rodriguez MD;  Location: [facility] OR;  Service: OB/GYN;  Laterality: N/A;    OPEN REDUCTION AND INTERNAL FIXATION (ORIF) OF FRACTURE OF CARPAL BONE Right 2023    Procedure: ORIF Scaphoid Right Wrist;  Surgeon: Oscar Bhandari DO;  Location: [facility] OR;  Service: Orthopedics;  Laterality: Right;    staph      staph infection abdomen         VITAL SIGNS - ABNORMAL VITALS INCLUDE TEMP >100.4,RR <12 or >26, SUSTAINED MATERNAL PULSE <60 or >120     VITAL SIGNS (Most Recent)  Temp: 98.1 °F (36.7 °C) (24 1015)  Pulse: 95 (24 1005)  BP: (!) 111/58 (24 1005)  SpO2: 97 % (24 1005)    VITAL SIGNS     normal  HEADACHE    no     VOMITING    no  VISUAL DISTURBANCES  no  EPIGASTRIC PAIN        no  PROTEINURIA 2+ or MORE             no   EDEMA FACE/EXTREMITIES            no    FETAL MOVEMENT     FETAL MOVEMENT: Active  FETAL HEART RATE BASELINE =  135    normal  FETAL  HEART RATE VARIABILITY:  Moderate  FETAL HEART RATE ACCELERATIONS FOR GESTATIONAL AGE: present  FETAL HEART RATE DECELERATIONS: none    ABDOMINAL PAIN/CRAMPING/CONTRACTIONS     Patient is complaining of abdominal pain/cramping/contractions. For  patients  3  contractions/hour. Contraction strength is mild. Resting tone is relaxed. Abdominal palpation is non-tender.    RUPTURE OF MEMBRANES OR LEAKING OF AMNIOTIC FLUID     Patient denies ROM or leaking of amniotic fluid.    VAGINAL BLEEDING     Patient denies vaginal bleeding.    VAGINAL EXAM     DILATION:  0    STATION:  -4  EFFACEMENT:  0  PRESENTATION:         VAGINAL EXAM DEFERRED DUE TO: na   PAIN PRESENT ON ARRIVAL     ONSET:   0800  LOCATION:  Lower abdomen  PAIN SCALE (0-10):  3  DESCRIPTION: cramping    Interventions           PATIENT DISPOSITION     Discharged Home      Dr. Dominguez notified at 1142 of the above assessment.    Vaibhav Rojas RN  [facility]  2024

## 2024-05-29 NOTE — NURSING
Nurses Note -- 4 Eyes      5/29/2024   10:13 AM      Skin assessed during: Q Shift Change      [x] No Altered Skin Integrity Present    []Prevention Measures Documented      [] Yes- Altered Skin Integrity Present or Discovered   [] LDA Added if Not in Epic (Describe Wound)   [] New Altered Skin Integrity was Present on Admit and Documented in LDA   [] Wound Image Taken    Wound Care Consulted? No    Attending Nurse:   Vaibhav Finch RN/Staff Member:   Gabriela

## 2024-05-29 NOTE — LETTER
May 29, 2024         [address]  [facility] [address]  Phone: [phone]  Fax: [phone]       Patient: Tosin Mckay   YOB: 1995  Date of Visit: 05/29/2024    To Whom It May Concern:    Fidencio Mckay  was at [facility] on 05/29/2024. The patient may return to work/school on 05/30/2024 with no restrictions. If you have any questions or concerns, or if I can be of further assistance, please do not hesitate to contact me.    Sincerely,    Vaibhav Rojas RN

## 2024-05-29 NOTE — DISCHARGE INSTRUCTIONS
Keep your scheduled appointment with your provider.    Call your Doctor if you have any of the following:  Temperature above 100 degrees  Nausea, vomiting and/or diarrhea  Severe headache, dizziness, or blurred vision  Notable increase in swelling of hands or feet  Notable swelling of face and lips  Difficulty, pain or burning with urination  Foul smelling vaginal discharge  Decreased fetal movement    Come to the hospital if you have any of the following symptoms:  Your water breaks  More than 4-6 contractions in 1 hour for 2 or more hours  Vaginal bleeding like a period    After 28 weeks, you should feel 10 distinct fetal movements within a 2 hour period.    It is recommended that you drink 1/2 a gallon of water each day.  Tea, Soda and Juice are  in addition to this.    Labor and Delivery Phone number: [phone]    If you need to be seen on Labor and delivery between the hours of 6pm and 7am, please enter through the Emergency room entrance.

## 2024-05-29 NOTE — PROGRESS NOTES
28 y.o.  at 33w2d who presented to L&D for abdominal pain.    She denies VB, LOF.  She reports normal FM..    Visit Vitals  BP (!) 111/58   Pulse 95   Temp 98.1 °F (36.7 °C) (Oral)   LMP 10/04/2023   SpO2 97%        NST duration = >60 minutes  FHR: normal baseline, moderate variability, spont accels, no decels  TOCO: occasional CTX's    fFN neg  UA neg  Ur Cx pending    CVX closed per RN    A/P Reassuring / reactive NST.    -continue current plan of care / appointments as scheduled  -PTL ervin MORIN MD

## 2024-05-31 LAB
BACTERIA UR CULT: NORMAL
BACTERIA UR CULT: NORMAL

## 2024-06-08 ENCOUNTER — HOSPITAL ENCOUNTER (OUTPATIENT)
Facility: HOSPITAL | Age: 29
Discharge: HOME | End: 2024-06-08
Attending: SPECIALIST | Admitting: GENERAL PRACTICE
Payer: MEDICAID

## 2024-06-08 VITALS
WEIGHT: 145 LBS | DIASTOLIC BLOOD PRESSURE: 58 MMHG | HEART RATE: 84 BPM | OXYGEN SATURATION: 100 % | RESPIRATION RATE: 16 BRPM | TEMPERATURE: 98 F | HEIGHT: 62 IN | SYSTOLIC BLOOD PRESSURE: 116 MMHG | BODY MASS INDEX: 26.68 KG/M2

## 2024-06-08 DIAGNOSIS — R10.9 ABDOMINAL CRAMPING: ICD-10-CM

## 2024-06-08 PROCEDURE — 99211 OFF/OP EST MAY X REQ PHY/QHP: CPT

## 2024-06-08 NOTE — NURSING
[facility]  Department of Obstetrics and Gynecology  Labor & Delivery Triage Assessment    PATIENT NAME: Tosin Mckay  MRN: [id number]  TODAY'S DATE: 2024    CHIEF COMPLAINT: vaginal bleeding and contractions    OB History    Para Term  AB Living   7 3 2 1 3 3   SAB IAB Ectopic Multiple Live Births   0 0 0 0 3      # Outcome Date GA Lbr Colby/2nd Weight Sex Type Anes PTL Lv   7 Current            6 AB            5 AB            4 AB            3  18 36w4d  2.56 kg (5 lb 10.3 oz) F Vag-Spont EPI Y JIM      Name: DEMETRICE,LUIS ALFREDO AGUSTIN      Apgar1: 8  Apgar5: 9   2 Term 17   3.09 kg (6 lb 13 oz) F Vag-Spont   JIM   1 Term 14   2.92 kg (6 lb 7 oz) M Vag-Spont   JIM     Past Medical History:   Diagnosis Date    Anemia     Herpes simplex virus (HSV) infection     Rubella non-immune status, antepartum      Past Surgical History:   Procedure Laterality Date    DILATION AND CURETTAGE OF UTERUS USING SUCTION N/A 2021    Procedure: DILATION AND CURETTAGE, UTERUS, USING SUCTION;  Surgeon: Gilbert Rodriguez MD;  Location: [facility] OR;  Service: OB/GYN;  Laterality: N/A;    OPEN REDUCTION AND INTERNAL FIXATION (ORIF) OF FRACTURE OF CARPAL BONE Right 2023    Procedure: ORIF Scaphoid Right Wrist;  Surgeon: Oscar Bhandari DO;  Location: [facility] OR;  Service: Orthopedics;  Laterality: Right;    staph      staph infection abdomen         VITAL SIGNS - ABNORMAL VITALS INCLUDE TEMP >100.4,RR <12 or >26, SUSTAINED MATERNAL PULSE <60 or >120     VITAL SIGNS (Most Recent)  Temp: 97.6 °F (36.4 °C) (24)  Pulse: 84 (24)  Resp: 16 (24)  BP: (!) 116/58 (24)  SpO2: 100 % (24)    VITAL SIGNS     normal  HEADACHE    no     VOMITING    no  VISUAL DISTURBANCES  no  EPIGASTRIC PAIN        no  EDEMA FACE/EXTREMITIES            no    FETAL MOVEMENT     FETAL MOVEMENT: Active  FETAL HEART RATE BASELINE =  145   normal  FETAL HEART RATE VARIABILITY:  Moderate  FETAL HEART RATE ACCELERATIONS FOR GESTATIONAL AGE: present  FETAL HEART RATE DECELERATIONS: none    ABDOMINAL PAIN/CRAMPING/CONTRACTIONS     Patient is complaining of abdominal pain/cramping/contractions. Contraction pattern is Irregular, less than 5 minutes apart. Pt has had no contractions during visit. Resting tone is relaxed. Abdominal palpation is non-tender.    RUPTURE OF MEMBRANES OR LEAKING OF AMNIOTIC FLUID     Patient reports leaking of amniotic fluid and is yellow in color and reporting amount as small.   ROM plus collected is Negative. GBS status is Unknown.    VAGINAL BLEEDING     Patient reports vaginal bleeding. Vaginal bleeding was absent on arrival. Vaginal bleeding started yesterday and the patient reports the amount as smal.    VAGINAL EXAM     DILATION:  0.5  STATION:  -3  EFFACEMENT:  50        PAIN PRESENT ON ARRIVAL     ONSET:   yesterday  LOCATION:  abdomen  PAIN SCALE (0-10):  3  DESCRIPTION: cramping    Interventions     None.      PATIENT DISPOSITION     Discharged Home      Dr. Titus notified at 1006 of the above assessment.    Reina Paulino RN  [facility]  06/08/2024

## 2024-06-08 NOTE — DISCHARGE INSTRUCTIONS
Keep your scheduled appointment with your provider.    Call your Doctor if you have any of the following:  Temperature above 100 degrees  Nausea, vomiting and/or diarrhea  Severe headache, dizziness, or blurred vision  Notable increase in swelling of hands or feet  Notable swelling of face and lips  Difficulty, pain or burning with urination  Foul smelling vaginal discharge  Decreased fetal movement    Come to the hospital if you have any of the following symptoms:  Your water breaks  More than 4-6 contractions in 1 hour for 2 or more hours  Vaginal bleeding like a period    After 28 weeks, you should feel 10 distinct fetal movements within a 2 hour period.    It is recommended that you drink 1/2 a gallon of water each day.  Tea, Soda and Juice are  in addition to this.    Labor and Delivery Phone number: [phone]    If you need to be seen on Labor and delivery between the hours of 6pm and 7am, please enter through the Emergency room entrance.

## 2024-06-13 ENCOUNTER — HOSPITAL ENCOUNTER (OUTPATIENT)
Facility: HOSPITAL | Age: 29
Discharge: HOME | End: 2024-06-13
Attending: OBSTETRICS & GYNECOLOGY | Admitting: OBSTETRICS & GYNECOLOGY
Payer: MEDICAID

## 2024-06-13 VITALS
DIASTOLIC BLOOD PRESSURE: 59 MMHG | HEART RATE: 77 BPM | OXYGEN SATURATION: 97 % | SYSTOLIC BLOOD PRESSURE: 106 MMHG | TEMPERATURE: 98 F | RESPIRATION RATE: 16 BRPM

## 2024-06-13 DIAGNOSIS — O26.899 ABDOMINAL PAIN AFFECTING PREGNANCY: ICD-10-CM

## 2024-06-13 DIAGNOSIS — R10.9 ABDOMINAL PAIN AFFECTING PREGNANCY: ICD-10-CM

## 2024-06-13 PROCEDURE — 99211 OFF/OP EST MAY X REQ PHY/QHP: CPT

## 2024-06-13 NOTE — DISCHARGE INSTRUCTIONS
Keep your scheduled appointment with your provider.    Call your Doctor if you have any of the following:  Temperature above 100 degrees  Nausea, vomiting and/or diarrhea  Severe headache, dizziness, or blurred vision  Notable increase in swelling of hands or feet  Notable swelling of face and lips  Difficulty, pain or burning with urination  Foul smelling vaginal discharge  Decreased fetal movement    Come to the hospital if you have any of the following symptoms:  Your water breaks  More than 4-6 contractions in 1 hour for 2 or more hours  Vaginal bleeding like a period    After 28 weeks, you should feel 10 distinct fetal movements within a 2 hour period.    It is recommended that you drink 1/2 a gallon of water each day.  Tea, Soda and Juice are  in addition to this.    Labor and Delivery Phone number: [phone]    If you need to be seen on Labor and delivery between the hours of 6pm and 7am, please enter through the Emergency room entrance.

## 2024-06-13 NOTE — NURSING
[facility]  Department of Obstetrics and Gynecology  Labor & Delivery Triage Assessment    PATIENT NAME: Tosin Mckay  MRN: [id number]  TODAY'S DATE: 2024    CHIEF COMPLAINT: Contractions      OB History    Para Term  AB Living   7 3 2 1 3 3   SAB IAB Ectopic Multiple Live Births   0 0 0 0 3      # Outcome Date GA Lbr Colby/2nd Weight Sex Type Anes PTL Lv   7 Current            6 AB            5 AB            4 AB            3  18 36w4d  2.56 kg (5 lb 10.3 oz) F Vag-Spont EPI Y JIM      Name: DEMETRICE,LUIS ALFREDO AGUSTIN      Apgar1: 8  Apgar5: 9   2 Term 17   3.09 kg (6 lb 13 oz) F Vag-Spont   JIM   1 Term 14   2.92 kg (6 lb 7 oz) M Vag-Spont   JIM     Past Medical History:   Diagnosis Date    Anemia     Herpes simplex virus (HSV) infection     Rubella non-immune status, antepartum      Past Surgical History:   Procedure Laterality Date    DILATION AND CURETTAGE OF UTERUS USING SUCTION N/A 2021    Procedure: DILATION AND CURETTAGE, UTERUS, USING SUCTION;  Surgeon: Gilbert Rodriguez MD;  Location: [facility] OR;  Service: OB/GYN;  Laterality: N/A;    OPEN REDUCTION AND INTERNAL FIXATION (ORIF) OF FRACTURE OF CARPAL BONE Right 2023    Procedure: ORIF Scaphoid Right Wrist;  Surgeon: Oscar Bhandari DO;  Location: [facility] OR;  Service: Orthopedics;  Laterality: Right;    staph      staph infection abdomen         VITAL SIGNS - ABNORMAL VITALS INCLUDE TEMP >100.4,RR <12 or >26, SUSTAINED MATERNAL PULSE <60 or >120     VITAL SIGNS (Most Recent)  Temp: 98.3 °F (36.8 °C) (24)  Pulse: 77 (24)  Resp: 16 (24)  BP: (!) 106/59 (24)  SpO2: 97 % (24)    VITAL SIGNS     normal  HEADACHE    no     VOMITING    no  VISUAL DISTURBANCES  no  EPIGASTRIC PAIN        no  PROTEINURIA 2+ or MORE             no   EDEMA FACE/EXTREMITIES            no    FETAL MOVEMENT     FETAL MOVEMENT: Active  FETAL HEART RATE  BASELINE =  135  normal  FETAL HEART RATE VARIABILITY:  Moderate  FETAL HEART RATE ACCELERATIONS FOR GESTATIONAL AGE: present  FETAL HEART RATE DECELERATIONS: none    ABDOMINAL PAIN/CRAMPING/CONTRACTIONS     Patient is complaining of abdominal pain/cramping/contractions. For  patients  6  contractions/hour. Contraction strength is mild. Resting tone is relaxed. Abdominal palpation is non-tender.    RUPTURE OF MEMBRANES OR LEAKING OF AMNIOTIC FLUID     Patient denies ROM or leaking of amniotic fluid.    VAGINAL BLEEDING     Patient denies vaginal bleeding.    VAGINAL EXAM     DILATION:  0.5  STATION:  -3  EFFACEMENT:  30          PAIN PRESENT ON ARRIVAL     ONSET:   2300 24  LOCATION:  Upper abdomin  PAIN SCALE (0-10):  8  DESCRIPTION: Tightness, and low abd pressure    Interventions     None.      PATIENT DISPOSITION     Discharged Home      Dr. Torres notified at 0113 of the above assessment.    Gabriela Starkey RN  [facility]  2024

## 2024-06-13 NOTE — PROGRESS NOTES
"RN notified MD with full report that patient is an unrefered patient with prenatal care in [place], [place], but is trying to transfer her pnc to Dr. Friend's office. Patient with c/o "abdominal contractions and pressure. No vag bleeding or LOF, good FM. Patient is currently being treated for a vaginal yeast infection. Contraction pattern and contractions are palpable as mild. Fhr pattern. Md to review strip and ordered rn to do a SVE and notify him with results.     0130 Secure chat to Dr. Torres the sve was fingertip dilation and 30%effaced, posterior medium consistancy cervix. MD reviewed strip and gave order to dc patient to home.     "

## 2024-06-21 LAB — PRENATAL STREP B CULTURE: NEGATIVE

## 2024-07-04 ENCOUNTER — ANESTHESIA EVENT (OUTPATIENT)
Dept: OBSTETRICS AND GYNECOLOGY | Facility: HOSPITAL | Age: 29
End: 2024-07-04

## 2024-07-04 ENCOUNTER — ANESTHESIA (OUTPATIENT)
Dept: OBSTETRICS AND GYNECOLOGY | Facility: HOSPITAL | Age: 29
End: 2024-07-04

## 2024-07-04 ENCOUNTER — HOSPITAL ENCOUNTER (OUTPATIENT)
Facility: HOSPITAL | Age: 29
Discharge: HOME | End: 2024-07-05
Attending: STUDENT IN AN ORGANIZED HEALTH CARE EDUCATION/TRAINING PROGRAM | Admitting: STUDENT IN AN ORGANIZED HEALTH CARE EDUCATION/TRAINING PROGRAM
Payer: MEDICAID

## 2024-07-04 DIAGNOSIS — Z34.90 PREGNANCY: ICD-10-CM

## 2024-07-04 PROCEDURE — 99211 OFF/OP EST MAY X REQ PHY/QHP: CPT

## 2024-07-05 VITALS
HEART RATE: 85 BPM | TEMPERATURE: 98 F | RESPIRATION RATE: 18 BRPM | DIASTOLIC BLOOD PRESSURE: 58 MMHG | SYSTOLIC BLOOD PRESSURE: 120 MMHG | OXYGEN SATURATION: 99 %

## 2024-07-05 NOTE — ANESTHESIA PREPROCEDURE EVALUATION
07/04/2024  Tosin Mckay is a 28 y.o., female.      Pre-op Assessment    I have reviewed the Patient Summary Reports.     I have reviewed the Nursing Notes. I have reviewed the NPO Status.   I have reviewed the Medications.     Review of Systems  Anesthesia Hx:  No problems with previous Anesthesia             Denies Family Hx of Anesthesia complications.    Denies Personal Hx of Anesthesia complications.                    Social:  Former Smoker, No Alcohol Use       Hematology/Oncology:    Oncology Normal    -- Anemia:                                  EENT/Dental:  EENT/Dental Normal           Cardiovascular:  Cardiovascular Normal                                            Pulmonary:  Pulmonary Normal                       Renal/:  Renal/ Normal                 Hepatic/GI:  Hepatic/GI Normal                 Musculoskeletal:  Musculoskeletal Normal                Neurological:  Neurology Normal                                      Endocrine:  Endocrine Normal            Dermatological:  Skin Normal    Psych:  Psychiatric Normal                    Physical Exam  General: Well nourished, Alert, Cooperative and Oriented    Airway:  Mallampati: II   Mouth Opening: Normal  TM Distance: > 6 cm  Tongue: Normal  Neck ROM: Normal ROM    Dental:  Intact    Chest/Lungs:  Clear to auscultation, Normal Respiratory Rate    Heart:  Rate: Normal  Rhythm: Regular Rhythm        Anesthesia Plan  Type of Anesthesia, risks & benefits discussed:    Anesthesia Type: Epidural  Intra-op Monitoring Plan: Standard ASA Monitors  Informed Consent: Informed consent signed with the Patient and all parties understand the risks and agree with anesthesia plan.  All questions answered. Patient consented to blood products? Yes  ASA Score: 2  Anesthesia Plan Notes:       Labor Epidural     Ready For Surgery From Anesthesia Perspective.      .

## 2024-07-05 NOTE — NURSING
[facility]  Department of Obstetrics and Gynecology  Labor & Delivery Triage Assessment    PATIENT NAME: Tosin Mckay  MRN: [id number]  TODAY'S DATE: 2024    CHIEF COMPLAINT: Contractions      OB History    Para Term  AB Living   7 3 2 1 3 3   SAB IAB Ectopic Multiple Live Births   0 0 0 0 3      # Outcome Date GA Lbr Colby/2nd Weight Sex Type Anes PTL Lv   7 Current            6 AB            5 AB            4 AB            3  18 36w4d  2.56 kg (5 lb 10.3 oz) F Vag-Spont EPI Y JIM      Name: DEMETRICE,LUIS ALFREDO AGUSTIN      Apgar1: 8  Apgar5: 9   2 Term 17   3.09 kg (6 lb 13 oz) F Vag-Spont   JIM   1 Term 14   2.92 kg (6 lb 7 oz) M Vag-Spont   JIM     Past Medical History:   Diagnosis Date    Anemia     Herpes simplex virus (HSV) infection     Rubella non-immune status, antepartum      Past Surgical History:   Procedure Laterality Date    DILATION AND CURETTAGE OF UTERUS USING SUCTION N/A 2021    Procedure: DILATION AND CURETTAGE, UTERUS, USING SUCTION;  Surgeon: Gilbert Rodriguez MD;  Location: [facility] OR;  Service: OB/GYN;  Laterality: N/A;    OPEN REDUCTION AND INTERNAL FIXATION (ORIF) OF FRACTURE OF CARPAL BONE Right 2023    Procedure: ORIF Scaphoid Right Wrist;  Surgeon: Oscar Bhandari DO;  Location: [facility] OR;  Service: Orthopedics;  Laterality: Right;    staph      staph infection abdomen         VITAL SIGNS - ABNORMAL VITALS INCLUDE TEMP >100.4,RR <12 or >26, SUSTAINED MATERNAL PULSE <60 or >120     VITAL SIGNS (Most Recent)       VITAL SIGNS     normal  HEADACHE    no     VOMITING    no  VISUAL DISTURBANCES  no  EPIGASTRIC PAIN        no  PROTEINURIA 2+ or MORE             no   EDEMA FACE/EXTREMITIES            no    FETAL MOVEMENT     FETAL MOVEMENT: Active  FETAL HEART RATE BASELINE =  130  normal  FETAL HEART RATE VARIABILITY:  Moderate  FETAL HEART RATE ACCELERATIONS FOR GESTATIONAL AGE: present  FETAL HEART RATE  "DECELERATIONS: none    ABDOMINAL PAIN/CRAMPING/CONTRACTIONS     Patient is complaining of abdominal pain/cramping/contractions. Contraction pattern is Irregular, less than 5 minutes apart. Contraction strength moderate. Resting tone is relaxed. Abdominal palpation is non-tender.    RUPTURE OF MEMBRANES OR LEAKING OF AMNIOTIC FLUID     Patient denies ROM or leaking of amniotic fluid.    VAGINAL BLEEDING     Patient reports vaginal bleeding. Vaginal bleeding was absent on arrival. Vaginal bleeding started yesterday and the patient reports the amount as small.    VAGINAL EXAM     DILATION:  2.5  STATION:  -3  EFFACEMENT:  70      PAIN PRESENT ON ARRIVAL     ONSET:   30min ago  LOCATION:  abd  PAIN SCALE (0-10):  7  DESCRIPTION: ctx    Interventions     2hr labor rule out      PATIENT DISPOSITION     Discharged Home      Dr. Kraft notified at 2135 of the above assessment.    No cervical change after a labor rule out. Discussed with patient about desires of wanting to go home. Pt stated "she wanted to think about it." Dr. Kraft contacted about above information and stated pt could stay longer to walk around unit and be rechecked. Pt informed of options per MD and pt states "she rather go home." Pt instructed to go to the nearest hospital if anything changes. Pt understood, left time for questions.. no further questions at this time.    Daily Brown RN  [facility]  07/04/2024             "

## 2024-07-13 ENCOUNTER — HOSPITAL ENCOUNTER (INPATIENT)
Facility: HOSPITAL | Age: 29
LOS: 2 days | Discharge: HOME | End: 2024-07-15
Attending: STUDENT IN AN ORGANIZED HEALTH CARE EDUCATION/TRAINING PROGRAM | Admitting: STUDENT IN AN ORGANIZED HEALTH CARE EDUCATION/TRAINING PROGRAM
Payer: MEDICAID

## 2024-07-13 ENCOUNTER — ANESTHESIA (OUTPATIENT)
Dept: OBSTETRICS AND GYNECOLOGY | Facility: HOSPITAL | Age: 29
End: 2024-07-13
Payer: MEDICAID

## 2024-07-13 ENCOUNTER — ANESTHESIA EVENT (OUTPATIENT)
Dept: OBSTETRICS AND GYNECOLOGY | Facility: HOSPITAL | Age: 29
End: 2024-07-13
Payer: MEDICAID

## 2024-07-13 DIAGNOSIS — S62.021A CLOSED TRANSVERSE FRACTURE OF WAIST OF SCAPHOID BONE OF RIGHT WRIST, INITIAL ENCOUNTER: ICD-10-CM

## 2024-07-13 LAB
ABO + RH BLD: NORMAL
BASOPHILS # BLD AUTO: 0.04 K/UL (ref 0–0.2)
BASOPHILS NFR BLD: 0.3 % (ref 0–1.9)
BLD GP AB SCN CELLS X3 SERPL QL: NORMAL
DIFFERENTIAL METHOD BLD: ABNORMAL
EOSINOPHIL # BLD AUTO: 0 K/UL (ref 0–0.5)
EOSINOPHIL NFR BLD: 0.1 % (ref 0–8)
ERYTHROCYTE [DISTWIDTH] IN BLOOD BY AUTOMATED COUNT: 13.2 % (ref 11.5–14.5)
HCT VFR BLD AUTO: 31.6 % (ref 37–48.5)
HGB BLD-MCNC: 10.1 G/DL (ref 12–16)
IMM GRANULOCYTES # BLD AUTO: 0.06 K/UL (ref 0–0.04)
IMM GRANULOCYTES NFR BLD AUTO: 0.4 % (ref 0–0.5)
LYMPHOCYTES # BLD AUTO: 2.2 K/UL (ref 1–4.8)
LYMPHOCYTES NFR BLD: 15.8 % (ref 18–48)
MCH RBC QN AUTO: 24.9 PG (ref 27–31)
MCHC RBC AUTO-ENTMCNC: 32 G/DL (ref 32–36)
MCV RBC AUTO: 78 FL (ref 82–98)
MONOCYTES # BLD AUTO: 0.9 K/UL (ref 0.3–1)
MONOCYTES NFR BLD: 6.2 % (ref 4–15)
NEUTROPHILS # BLD AUTO: 10.9 K/UL (ref 1.8–7.7)
NEUTROPHILS NFR BLD: 77.2 % (ref 38–73)
NRBC BLD-RTO: 0 /100 WBC
PLATELET # BLD AUTO: 268 K/UL (ref 150–450)
PMV BLD AUTO: 10.1 FL (ref 9.2–12.9)
RBC # BLD AUTO: 4.06 M/UL (ref 4–5.4)
WBC # BLD AUTO: 14.08 K/UL (ref 3.9–12.7)

## 2024-07-13 PROCEDURE — C1751 CATH, INF, PER/CENT/MIDLINE: HCPCS | Performed by: ANESTHESIOLOGY

## 2024-07-13 PROCEDURE — 81003 URINALYSIS AUTO W/O SCOPE: CPT | Mod: 59 | Performed by: STUDENT IN AN ORGANIZED HEALTH CARE EDUCATION/TRAINING PROGRAM

## 2024-07-13 PROCEDURE — 27200710 HC EPIDURAL INFUSION PUMP SET: Performed by: ANESTHESIOLOGY

## 2024-07-13 PROCEDURE — 85025 COMPLETE CBC W/AUTO DIFF WBC: CPT | Performed by: STUDENT IN AN ORGANIZED HEALTH CARE EDUCATION/TRAINING PROGRAM

## 2024-07-13 PROCEDURE — 80307 DRUG TEST PRSMV CHEM ANLYZR: CPT | Performed by: STUDENT IN AN ORGANIZED HEALTH CARE EDUCATION/TRAINING PROGRAM

## 2024-07-13 PROCEDURE — 80354 DRUG SCREENING FENTANYL: CPT | Performed by: STUDENT IN AN ORGANIZED HEALTH CARE EDUCATION/TRAINING PROGRAM

## 2024-07-13 PROCEDURE — 63600175 PHARM REV CODE 636 W HCPCS: Performed by: ANESTHESIOLOGY

## 2024-07-13 PROCEDURE — 62326 NJX INTERLAMINAR LMBR/SAC: CPT | Performed by: ANESTHESIOLOGY

## 2024-07-13 PROCEDURE — 86850 RBC ANTIBODY SCREEN: CPT | Performed by: STUDENT IN AN ORGANIZED HEALTH CARE EDUCATION/TRAINING PROGRAM

## 2024-07-13 PROCEDURE — 86593 SYPHILIS TEST NON-TREP QUANT: CPT | Performed by: STUDENT IN AN ORGANIZED HEALTH CARE EDUCATION/TRAINING PROGRAM

## 2024-07-13 PROCEDURE — 25000003 PHARM REV CODE 250: Performed by: STUDENT IN AN ORGANIZED HEALTH CARE EDUCATION/TRAINING PROGRAM

## 2024-07-13 PROCEDURE — 63600175 PHARM REV CODE 636 W HCPCS: Performed by: STUDENT IN AN ORGANIZED HEALTH CARE EDUCATION/TRAINING PROGRAM

## 2024-07-13 PROCEDURE — 12000002 HC ACUTE/MED SURGE SEMI-PRIVATE ROOM

## 2024-07-13 RX ORDER — OXYTOCIN 10 [USP'U]/ML
10 INJECTION, SOLUTION INTRAMUSCULAR; INTRAVENOUS ONCE AS NEEDED
Status: DISCONTINUED | OUTPATIENT
Start: 2024-07-13 | End: 2024-07-14

## 2024-07-13 RX ORDER — CARBOPROST TROMETHAMINE 250 UG/ML
250 INJECTION, SOLUTION INTRAMUSCULAR
Status: DISCONTINUED | OUTPATIENT
Start: 2024-07-13 | End: 2024-07-14

## 2024-07-13 RX ORDER — DIPHENHYDRAMINE HYDROCHLORIDE 50 MG/ML
12.5 INJECTION INTRAMUSCULAR; INTRAVENOUS EVERY 4 HOURS PRN
Status: CANCELLED | OUTPATIENT
Start: 2024-07-14

## 2024-07-13 RX ORDER — OXYTOCIN-SODIUM CHLORIDE 0.9% IV SOLN 30 UNIT/500ML 30-0.9/5 UT/ML-%
10 SOLUTION INTRAVENOUS ONCE
Status: DISCONTINUED | OUTPATIENT
Start: 2024-07-13 | End: 2024-07-14

## 2024-07-13 RX ORDER — SODIUM CHLORIDE, SODIUM LACTATE, POTASSIUM CHLORIDE, CALCIUM CHLORIDE 600; 310; 30; 20 MG/100ML; MG/100ML; MG/100ML; MG/100ML
INJECTION, SOLUTION INTRAVENOUS CONTINUOUS
Status: DISCONTINUED | OUTPATIENT
Start: 2024-07-13 | End: 2024-07-14

## 2024-07-13 RX ORDER — DIPHENOXYLATE HYDROCHLORIDE AND ATROPINE SULFATE 2.5; .025 MG/1; MG/1
2 TABLET ORAL EVERY 6 HOURS PRN
Status: DISCONTINUED | OUTPATIENT
Start: 2024-07-13 | End: 2024-07-14

## 2024-07-13 RX ORDER — FENTANYL/BUPIVACAINE/NS/PF 2MCG/ML-.1
10 PLASTIC BAG, INJECTION (ML) INJECTION CONTINUOUS
Status: DISCONTINUED | OUTPATIENT
Start: 2024-07-13 | End: 2024-07-14

## 2024-07-13 RX ORDER — METHYLERGONOVINE MALEATE 0.2 MG/ML
200 INJECTION INTRAVENOUS ONCE AS NEEDED
Status: DISCONTINUED | OUTPATIENT
Start: 2024-07-13 | End: 2024-07-14

## 2024-07-13 RX ORDER — ROPIVACAINE HYDROCHLORIDE 2 MG/ML
20 INJECTION, SOLUTION EPIDURAL; INFILTRATION ONCE
Status: COMPLETED | OUTPATIENT
Start: 2024-07-13 | End: 2024-07-13

## 2024-07-13 RX ORDER — CALCIUM CARBONATE 200(500)MG
500 TABLET,CHEWABLE ORAL 3 TIMES DAILY PRN
Status: DISCONTINUED | OUTPATIENT
Start: 2024-07-13 | End: 2024-07-15 | Stop reason: HOSPADM

## 2024-07-13 RX ORDER — OXYTOCIN-SODIUM CHLORIDE 0.9% IV SOLN 30 UNIT/500ML 30-0.9/5 UT/ML-%
95 SOLUTION INTRAVENOUS ONCE AS NEEDED
Status: DISCONTINUED | OUTPATIENT
Start: 2024-07-13 | End: 2024-07-14

## 2024-07-13 RX ORDER — OXYTOCIN-SODIUM CHLORIDE 0.9% IV SOLN 30 UNIT/500ML 30-0.9/5 UT/ML-%
95 SOLUTION INTRAVENOUS ONCE
Status: DISCONTINUED | OUTPATIENT
Start: 2024-07-13 | End: 2024-07-14

## 2024-07-13 RX ORDER — SODIUM CHLORIDE 9 MG/ML
INJECTION, SOLUTION INTRAVENOUS
Status: DISCONTINUED | OUTPATIENT
Start: 2024-07-13 | End: 2024-07-14

## 2024-07-13 RX ORDER — SIMETHICONE 80 MG
1 TABLET,CHEWABLE ORAL 4 TIMES DAILY PRN
Status: DISCONTINUED | OUTPATIENT
Start: 2024-07-13 | End: 2024-07-14

## 2024-07-13 RX ORDER — MISOPROSTOL 200 UG/1
800 TABLET ORAL ONCE AS NEEDED
Status: DISCONTINUED | OUTPATIENT
Start: 2024-07-13 | End: 2024-07-14

## 2024-07-13 RX ORDER — LIDOCAINE HYDROCHLORIDE 10 MG/ML
10 INJECTION INFILTRATION; PERINEURAL ONCE AS NEEDED
Status: DISCONTINUED | OUTPATIENT
Start: 2024-07-13 | End: 2024-07-14

## 2024-07-13 RX ORDER — EPHEDRINE SULFATE 50 MG/ML
10 INJECTION, SOLUTION INTRAVENOUS ONCE AS NEEDED
Status: COMPLETED | OUTPATIENT
Start: 2024-07-13 | End: 2024-07-14

## 2024-07-13 RX ORDER — OXYTOCIN-SODIUM CHLORIDE 0.9% IV SOLN 30 UNIT/500ML 30-0.9/5 UT/ML-%
10 SOLUTION INTRAVENOUS ONCE AS NEEDED
Status: DISCONTINUED | OUTPATIENT
Start: 2024-07-13 | End: 2024-07-14

## 2024-07-13 RX ORDER — TRANEXAMIC ACID 10 MG/ML
1000 INJECTION, SOLUTION INTRAVENOUS EVERY 30 MIN PRN
Status: DISCONTINUED | OUTPATIENT
Start: 2024-07-13 | End: 2024-07-14

## 2024-07-13 RX ORDER — FENTANYL/BUPIVACAINE/NS/PF 2MCG/ML-.1
14 PLASTIC BAG, INJECTION (ML) INJECTION CONTINUOUS
Status: CANCELLED | OUTPATIENT
Start: 2024-07-13

## 2024-07-13 RX ORDER — ONDANSETRON HYDROCHLORIDE 2 MG/ML
4 INJECTION, SOLUTION INTRAVENOUS EVERY 6 HOURS PRN
Status: CANCELLED | OUTPATIENT
Start: 2024-07-14

## 2024-07-13 RX ORDER — ONDANSETRON 4 MG/1
8 TABLET, ORALLY DISINTEGRATING ORAL EVERY 8 HOURS PRN
Status: DISCONTINUED | OUTPATIENT
Start: 2024-07-13 | End: 2024-07-14

## 2024-07-13 RX ORDER — NALOXONE HCL 0.4 MG/ML
0.4 VIAL (ML) INJECTION SEE ADMIN INSTRUCTIONS
Status: CANCELLED | OUTPATIENT
Start: 2024-07-14

## 2024-07-13 RX ORDER — EPHEDRINE SULFATE 50 MG/ML
10 INJECTION, SOLUTION INTRAVENOUS ONCE AS NEEDED
Status: CANCELLED | OUTPATIENT
Start: 2024-07-14 | End: 2035-12-10

## 2024-07-13 RX ADMIN — ROPIVACAINE HYDROCHLORIDE 3 ML: 2 INJECTION, SOLUTION EPIDURAL; INFILTRATION at 11:07

## 2024-07-13 RX ADMIN — SODIUM CHLORIDE, POTASSIUM CHLORIDE, SODIUM LACTATE AND CALCIUM CHLORIDE 500 ML: 600; 310; 30; 20 INJECTION, SOLUTION INTRAVENOUS at 11:07

## 2024-07-13 RX ADMIN — SODIUM CHLORIDE, POTASSIUM CHLORIDE, SODIUM LACTATE AND CALCIUM CHLORIDE 1000 ML: 600; 310; 30; 20 INJECTION, SOLUTION INTRAVENOUS at 10:07

## 2024-07-13 RX ADMIN — SODIUM CHLORIDE, POTASSIUM CHLORIDE, SODIUM LACTATE AND CALCIUM CHLORIDE: 600; 310; 30; 20 INJECTION, SOLUTION INTRAVENOUS at 11:07

## 2024-07-13 RX ADMIN — Medication 12 ML/HR: at 11:07

## 2024-07-14 LAB
AMPHET+METHAMPHET UR QL: NEGATIVE
BARBITURATES UR QL SCN>200 NG/ML: NEGATIVE
BENZODIAZ UR QL SCN>200 NG/ML: NEGATIVE
BILIRUB UR QL STRIP: NEGATIVE
BUPRENORPHINE UR QL: NEGATIVE
BZE UR QL SCN: NEGATIVE
CANNABINOIDS UR QL SCN: ABNORMAL
CLARITY UR: CLEAR
COLOR UR: YELLOW
CREAT UR-MCNC: 58 MG/DL (ref 15–325)
CREAT UR-MCNC: 58 MG/DL (ref 15–325)
CREAT UR-MCNC: 58.1 MG/DL (ref 15–325)
FENTANYL UR QL SCN: NORMAL
GLUCOSE UR QL STRIP: NEGATIVE
HGB UR QL STRIP: NEGATIVE
KETONES UR QL STRIP: NEGATIVE
LEUKOCYTE ESTERASE UR QL STRIP: NEGATIVE
NITRITE UR QL STRIP: NEGATIVE
OPIATES UR QL SCN: NEGATIVE
PCP UR QL SCN>25 NG/ML: NEGATIVE
PH UR STRIP: 8 [PH] (ref 5–8)
PROT UR QL STRIP: NEGATIVE
SP GR UR STRIP: 1.01 (ref 1–1.03)
TOXICOLOGY INFORMATION: ABNORMAL
TREPONEMA PALLIDUM IGG+IGM AB [PRESENCE] IN SERUM OR PLASMA BY IMMUNOASSAY: NONREACTIVE
URN SPEC COLLECT METH UR: ABNORMAL
UROBILINOGEN UR STRIP-ACNC: ABNORMAL EU/DL

## 2024-07-14 PROCEDURE — 25000003 PHARM REV CODE 250: Performed by: STUDENT IN AN ORGANIZED HEALTH CARE EDUCATION/TRAINING PROGRAM

## 2024-07-14 PROCEDURE — 51702 INSERT TEMP BLADDER CATH: CPT

## 2024-07-14 PROCEDURE — 80348 DRUG SCREENING BUPRENORPHINE: CPT | Performed by: STUDENT IN AN ORGANIZED HEALTH CARE EDUCATION/TRAINING PROGRAM

## 2024-07-14 PROCEDURE — 87491 CHLMYD TRACH DNA AMP PROBE: CPT | Performed by: STUDENT IN AN ORGANIZED HEALTH CARE EDUCATION/TRAINING PROGRAM

## 2024-07-14 PROCEDURE — 72200004 HC VAGINAL DELIVERY LEVEL I

## 2024-07-14 PROCEDURE — 12000002 HC ACUTE/MED SURGE SEMI-PRIVATE ROOM

## 2024-07-14 PROCEDURE — 63600175 PHARM REV CODE 636 W HCPCS: Performed by: STUDENT IN AN ORGANIZED HEALTH CARE EDUCATION/TRAINING PROGRAM

## 2024-07-14 RX ORDER — MISOPROSTOL 200 UG/1
800 TABLET ORAL ONCE AS NEEDED
Status: DISCONTINUED | OUTPATIENT
Start: 2024-07-14 | End: 2024-07-15 | Stop reason: HOSPADM

## 2024-07-14 RX ORDER — OXYTOCIN-SODIUM CHLORIDE 0.9% IV SOLN 30 UNIT/500ML 30-0.9/5 UT/ML-%
95 SOLUTION INTRAVENOUS ONCE AS NEEDED
Status: DISCONTINUED | OUTPATIENT
Start: 2024-07-14 | End: 2024-07-15 | Stop reason: HOSPADM

## 2024-07-14 RX ORDER — DIPHENHYDRAMINE HCL 25 MG
25 CAPSULE ORAL EVERY 4 HOURS PRN
Status: DISCONTINUED | OUTPATIENT
Start: 2024-07-14 | End: 2024-07-15 | Stop reason: HOSPADM

## 2024-07-14 RX ORDER — CARBOPROST TROMETHAMINE 250 UG/ML
250 INJECTION, SOLUTION INTRAMUSCULAR
Status: DISCONTINUED | OUTPATIENT
Start: 2024-07-14 | End: 2024-07-15 | Stop reason: HOSPADM

## 2024-07-14 RX ORDER — IBUPROFEN 400 MG/1
800 TABLET ORAL EVERY 6 HOURS PRN
Status: DISCONTINUED | OUTPATIENT
Start: 2024-07-14 | End: 2024-07-15 | Stop reason: HOSPADM

## 2024-07-14 RX ORDER — TRANEXAMIC ACID 10 MG/ML
1000 INJECTION, SOLUTION INTRAVENOUS EVERY 30 MIN PRN
Status: DISCONTINUED | OUTPATIENT
Start: 2024-07-14 | End: 2024-07-15 | Stop reason: HOSPADM

## 2024-07-14 RX ORDER — OXYTOCIN-SODIUM CHLORIDE 0.9% IV SOLN 30 UNIT/500ML 30-0.9/5 UT/ML-%
10 SOLUTION INTRAVENOUS ONCE AS NEEDED
Status: DISCONTINUED | OUTPATIENT
Start: 2024-07-14 | End: 2024-07-15 | Stop reason: HOSPADM

## 2024-07-14 RX ORDER — METHYLERGONOVINE MALEATE 0.2 MG/ML
200 INJECTION INTRAVENOUS ONCE AS NEEDED
Status: DISCONTINUED | OUTPATIENT
Start: 2024-07-14 | End: 2024-07-15 | Stop reason: HOSPADM

## 2024-07-14 RX ORDER — SIMETHICONE 80 MG
1 TABLET,CHEWABLE ORAL EVERY 6 HOURS PRN
Status: DISCONTINUED | OUTPATIENT
Start: 2024-07-14 | End: 2024-07-15 | Stop reason: HOSPADM

## 2024-07-14 RX ORDER — HYDROCORTISONE 25 MG/G
CREAM TOPICAL 3 TIMES DAILY PRN
Status: DISCONTINUED | OUTPATIENT
Start: 2024-07-14 | End: 2024-07-15 | Stop reason: HOSPADM

## 2024-07-14 RX ORDER — OXYCODONE AND ACETAMINOPHEN 10; 325 MG/1; MG/1
1 TABLET ORAL EVERY 4 HOURS PRN
Status: DISCONTINUED | OUTPATIENT
Start: 2024-07-14 | End: 2024-07-15 | Stop reason: HOSPADM

## 2024-07-14 RX ORDER — DIPHENOXYLATE HYDROCHLORIDE AND ATROPINE SULFATE 2.5; .025 MG/1; MG/1
2 TABLET ORAL EVERY 6 HOURS PRN
Status: DISCONTINUED | OUTPATIENT
Start: 2024-07-14 | End: 2024-07-15 | Stop reason: HOSPADM

## 2024-07-14 RX ORDER — ONDANSETRON 4 MG/1
8 TABLET, ORALLY DISINTEGRATING ORAL EVERY 8 HOURS PRN
Status: DISCONTINUED | OUTPATIENT
Start: 2024-07-14 | End: 2024-07-15 | Stop reason: HOSPADM

## 2024-07-14 RX ORDER — OXYTOCIN-SODIUM CHLORIDE 0.9% IV SOLN 30 UNIT/500ML 30-0.9/5 UT/ML-%
95 SOLUTION INTRAVENOUS ONCE
Status: DISCONTINUED | OUTPATIENT
Start: 2024-07-14 | End: 2024-07-15 | Stop reason: HOSPADM

## 2024-07-14 RX ORDER — PRENATAL WITH FERROUS FUM AND FOLIC ACID 3080; 920; 120; 400; 22; 1.84; 3; 20; 10; 1; 12; 200; 27; 25; 2 [IU]/1; [IU]/1; MG/1; [IU]/1; MG/1; MG/1; MG/1; MG/1; MG/1; MG/1; UG/1; MG/1; MG/1; MG/1; MG/1
1 TABLET ORAL DAILY
Status: DISCONTINUED | OUTPATIENT
Start: 2024-07-14 | End: 2024-07-15 | Stop reason: HOSPADM

## 2024-07-14 RX ORDER — OXYTOCIN 10 [USP'U]/ML
10 INJECTION, SOLUTION INTRAMUSCULAR; INTRAVENOUS ONCE AS NEEDED
Status: DISCONTINUED | OUTPATIENT
Start: 2024-07-14 | End: 2024-07-15 | Stop reason: HOSPADM

## 2024-07-14 RX ORDER — OXYCODONE AND ACETAMINOPHEN 5; 325 MG/1; MG/1
1 TABLET ORAL EVERY 4 HOURS PRN
Status: DISCONTINUED | OUTPATIENT
Start: 2024-07-14 | End: 2024-07-15 | Stop reason: HOSPADM

## 2024-07-14 RX ORDER — DOCUSATE SODIUM 100 MG/1
200 CAPSULE, LIQUID FILLED ORAL 2 TIMES DAILY PRN
Status: DISCONTINUED | OUTPATIENT
Start: 2024-07-14 | End: 2024-07-15 | Stop reason: HOSPADM

## 2024-07-14 RX ADMIN — IBUPROFEN 800 MG: 400 TABLET ORAL at 05:07

## 2024-07-14 RX ADMIN — CALCIUM CARBONATE (ANTACID) CHEW TAB 500 MG 500 MG: 500 CHEW TAB at 08:07

## 2024-07-14 RX ADMIN — BENZOCAINE AND LEVOMENTHOL: 200; 5 SPRAY TOPICAL at 06:07

## 2024-07-14 RX ADMIN — PRENATAL VIT W/ FE FUMARATE-FA TAB 27-0.8 MG 1 TABLET: 27-0.8 TAB at 08:07

## 2024-07-14 RX ADMIN — OXYCODONE HYDROCHLORIDE AND ACETAMINOPHEN 1 TABLET: 10; 325 TABLET ORAL at 06:07

## 2024-07-14 RX ADMIN — IBUPROFEN 800 MG: 400 TABLET ORAL at 10:07

## 2024-07-14 RX ADMIN — Medication 10 UNITS: at 12:07

## 2024-07-14 RX ADMIN — CALCIUM CARBONATE (ANTACID) CHEW TAB 500 MG 500 MG: 500 CHEW TAB at 12:07

## 2024-07-14 RX ADMIN — OXYCODONE HYDROCHLORIDE AND ACETAMINOPHEN 1 TABLET: 10; 325 TABLET ORAL at 12:07

## 2024-07-14 RX ADMIN — CALCIUM CARBONATE (ANTACID) CHEW TAB 500 MG 500 MG: 500 CHEW TAB at 02:07

## 2024-07-14 RX ADMIN — IBUPROFEN 800 MG: 400 TABLET ORAL at 04:07

## 2024-07-14 NOTE — NURSING
Nurses Note -- 4 Eyes      7/14/2024   2:50 AM      Skin assessed during: Admit      [x] No Altered Skin Integrity Present    [x]Prevention Measures Documented      [] Yes- Altered Skin Integrity Present or Discovered   [] LDA Added if Not in Epic (Describe Wound)   [] New Altered Skin Integrity was Present on Admit and Documented in LDA   [] Wound Image Taken    Wound Care Consulted? No    Attending Nurse:   PEREZ Fabian    Second RN/Staff Member:   PEREZ Morrison

## 2024-07-14 NOTE — H&P
[facility]  Obstetrics  History & Physical    Patient Name: Tosin Mckay  MRN: [id number]  Admission Date: 2024  Primary Care Provider: Jenna, Primary Doctor    Subjective:     Principal Problem:  Labor    History of Present Illness:  28-year-old G7 P 2 1 33 @ 39 weeks and 6 days presented to labor and delivery in active labor.  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  Admitted for labor.      OB History    Para Term  AB Living   7 4 3 1 3 4   SAB IAB Ectopic Multiple Live Births   0 0 0 0 4      # Outcome Date GA Lbr Colby/2nd Weight Sex Type Anes PTL Lv   7 Term 24 39w6d 01:50 / 00:23 3.02 kg (6 lb 10.5 oz) F Vag-Spont EPI N JIM      Name: Kelsey Mckay      Apgar1: 8  Apgar5: 8   6 AB            5 AB            4 AB            3  18 36w4d  2.56 kg (5 lb 10.3 oz) F Vag-Spont EPI Y JIM      Name: DEMETRICE,KELSEY AGUSTIN      Apgar1: 8  Apgar5: 9   2 Term 17   3.09 kg (6 lb 13 oz) F Vag-Spont   JIM   1 Term 14   2.92 kg (6 lb 7 oz) M Vag-Spont   JIM     Past Medical History:   Diagnosis Date    Anemia     Herpes simplex virus (HSV) infection     Rubella non-immune status, antepartum      Past Surgical History:   Procedure Laterality Date    DILATION AND CURETTAGE OF UTERUS USING SUCTION N/A 2021    Procedure: DILATION AND CURETTAGE, UTERUS, USING SUCTION;  Surgeon: Gilbert Rodriguez MD;  Location: [facility] OR;  Service: OB/GYN;  Laterality: N/A;    OPEN REDUCTION AND INTERNAL FIXATION (ORIF) OF FRACTURE OF CARPAL BONE Right 2023    Procedure: ORIF Scaphoid Right Wrist;  Surgeon: Oscar Bhandari DO;  Location: [facility] OR;  Service: Orthopedics;  Laterality: Right;    staph      staph infection abdomen       PTA Medications   Medication Sig    ferrous sulfate (FEOSOL) 325 mg (65 mg iron) Tab tablet Take 325 mg by mouth daily  with breakfast.    ondansetron (ZOFRAN-ODT) 8 MG TbDL DISSOLVE 1 TABLET(8 MG) ON THE TONGUE EVERY 8 HOURS AS NEEDED FOR NAUSEA    prenatal no122/iron/folic acid (PRENATAL MULTI ORAL) Take by mouth.    valACYclovir (VALTREX) 500 MG tablet Take 1 tablet (500 mg total) by mouth once daily.       Review of patient's allergies indicates:  No Known Allergies     Family History       Problem Relation (Age of Onset)    Cancer Mother    Cervical cancer Mother    Hypertension Father          Tobacco Use    Smoking status: Some Days     Current packs/day: 0.00     Types: Cigarettes, Vaping w/o nicotine     Last attempt to quit: 2021     Years since quitting: 3.5     Passive exposure: Past    Smokeless tobacco: Never   Substance and Sexual Activity    Alcohol use: Not Currently    Drug use: Yes     Frequency: 2.0 times per week     Comment: THC gummies    Sexual activity: Yes     Partners: Male     Birth control/protection: None     Review of Systems negative except above  Objective:     Vital Signs (Most Recent):  Pulse: 81 (07/13/24 2223)  Resp: 18 (07/13/24 2223)  BP: 125/80 (07/13/24 2223) Vital Signs (24h Range):  Pulse:  [81] 81  Resp:  [18] 18  BP: (125)/(80) 125/80     Weight: 68 kg (150 lb)  Body mass index is 27.44 kg/m².    FHT:  140 beats per minute, moderate variability, positive accelerations, no decelerations   TOCO:  Q 2-5 minutes    Physical Exam  Per nurse admission  Cervix:  Dilation:  5  Effacement:  75%  Station: -3  Presentation: Vertex     Significant Labs:  Lab Results   Component Value Date    GROUPTRH O POS 07/13/2024    HEPBSAG Negative 12/20/2023    STREPBCULT Negative 06/21/2024       I have personallly reviewed all pertinent lab results from the last 24 hours.  Recent Lab Results         07/14/24  0011   07/13/24 2222 07/13/24 2220 07/13/24 2219        Benzodiazepines     Negative         Phencyclidine     Negative         BUPRENORPHINE Negative  Comment: Buprenorphine urine screening  threshold: 5 ng/mL.    This report is intended for use in clinical monitoring and management   of   patients only.                Amphetamines, Urine     Negative         Appearance, UA       Clear       Barbituates, Urine     Negative         Baso #   0.04           Basophil %   0.3           Bilirubin (UA)       Negative       Cocaine, Urine     Negative         Color, UA       Yellow       Urine Creatinine 58.1  Comment: The random urine reference ranges provided were established   for 24 hour urine collections.  No reference ranges exist for  random urine specimens.  Correlate clinically.       58.0  Comment: The random urine reference ranges provided were established   for 24 hour urine collections.  No reference ranges exist for  random urine specimens.  Correlate clinically.                58.0  Comment: The random urine reference ranges provided were established   for 24 hour urine collections.  No reference ranges exist for  random urine specimens.  Correlate clinically.           Differential Method   Automated           Eos #   0.0           Eos %   0.1           Fentanyl, Urine     Negative @VARINDER  Comment: The cut-off value is 5.0 ng/mL. This is a screening test. If results   do not   correlate with clinical presentation then a confirmatory send out   test is   advised. This result is intended for use in clinical management. It   is not   intended for use in employment related testing.           Glucose, UA       Negative       Gran # (ANC)   10.9           Gran %   77.2           Group & Rh   O POS           Hematocrit   31.6           Hemoglobin   10.1           Immature Grans (Abs)   0.06  Comment: Mild elevation in immature granulocytes is non specific and   can be seen in a variety of conditions including stress response,   acute inflammation, trauma and pregnancy. Correlation with other   laboratory and clinical findings is essential.             Immature Granulocytes   0.4           INDIRECT  JUANJO   NEG           Ketones, UA       Negative       Leukocyte Esterase, UA       Negative       Lymph #   2.2           Lymph %   15.8           MCH   24.9           MCHC   32.0           MCV   78           Mono #   0.9           Mono %   6.2           MPV   10.1           NITRITE UA       Negative       nRBC   0           Blood, UA       Negative       Opiates, Urine     Negative         pH, UA       8.0       Platelet Count   268           Protein, UA       Negative  Comment: Recommend a 24 hour urine protein or a urine   protein/creatinine ratio if globulin induced proteinuria is  clinically suspected.         RBC   4.06           RDW   13.2           Spec Grav UA       1.015       Specimen UA       Urine, Clean Catch       Marijuana (THC) Metabolite     Presumptive Positive         Toxicology Information     SEE COMMENT  Comment: This screen includes the following classes of drugs at the   listed cut-off:    Benzodiazepines                  200 ng/ml  Cocaine metabolite               300 ng/ml  Opiates                          300 ng/ml  Barbiturates                     200 ng/ml  Amphetamines                    1000 ng/ml  Marijuana metabs (THC)            50 ng/ml  Phencyclidine (PCP)               25 ng/ml    High concentrations of Methylenedioxymethamphetamine (MDMA aka  Ectasy) and other structurally similar compounds may cross-   react with the Amphetamine/Methamphetamine screening   immunoassay giving a false positive result.    Note: This exception list includes only more common   interferants in toxicology screen testing.  Because of many   cross-reactantspositive results on toxicology drug screens   should be confirmed whenever results do not correlate with   clinical presentation.    This report is intended for use in clinical monitoring and  management of patients. It is not intended for use in   employment related drug testing.    Because of any cross-reactants, positive results on  toxicology  drug screens should be confirmed whenever results do not  correlate with clinical presentation.    Presumptive positive results are unconfirmed and may be used   only for medical purposes.           UROBILINOGEN UA       2.0-3.0       WBC   14.08                   Assessment/Plan:     28 y.o. female  at 39w6d for:    -labor:  We will augment with Pitocin if contractions space   -Epidural on request   -GBS negative   -fetal heart tones reactive and reassuring    Brooklyn Kraft MD, MD  Obstetrics  [facility]

## 2024-07-14 NOTE — PLAN OF CARE
Assessment completed: at bedside with mother and father.    Address mother and baby will discharge home to:   [address]  [place] [address]       History of Substance Abuse issues:  Mother denies                Assistive Treatment Programs or Medications?  Mother denies    History of Mental Health issues:  Mother denies    History of Domestic Violence:  Mother denies         24 0855   OB SCREEN   Assessment Type Discharge Planning Assessment   Source of Information health record   Received Prenatal Care Yes   Is this a teen pregnancy No   Indication for adoption/Safe Haven No   Indication for DME/post-acute needs No   HIV (+) No   Any congenital  disorders No   Fetal demise/ death No

## 2024-07-14 NOTE — NURSING
[facility]  Department of OBGYN  OB Summary        PATIENT NAME: Tosin Mckay                                                                                                                                                                       AGE: 28 y.o.                                                                                          MRN: [id number]  PATIENT LOCATION:  R3/LDR3-A  ADMIT DATE: 2024  TODAY'S DATE: 2024 Hospital Day: 2  REECE: Estimated Date of Delivery: 7/15/24  GA: 39w6d     OB HISTORY:    OB History    Para Term  AB Living   7 4 3 1 3 4   SAB IAB Ectopic Multiple Live Births   0     0 4      # Outcome Date GA Lbr Colby/2nd Weight Sex Type Anes PTL Lv   7 Term 24 39w6d 01:50 / 00:23 3.02 kg (6 lb 10.5 oz) F Vag-Spont EPI N JIM   6 AB            5 AB            4 AB            3  18 36w4d  2.56 kg (5 lb 10.3 oz) F Vag-Spont EPI Y JIM   2 Term 17   3.09 kg (6 lb 13 oz) F Vag-Spont   JIM   1 Term 14   2.92 kg (6 lb 7 oz) M Vag-Spont   JIM       PRE-PROCEDURE DIAGNOSIS:  (spontaneous vaginal delivery)    PROCEDURE:   * No surgery found *       MATERNAL LABS   Lab Results   Component Value Date    GROUPTRH O POS 2024    HGB 10.1 (L) 2024    HCT 31.6 (L) 2024     2024    RUBELLAIMMUN Non-Reactive (A) 2018    HEPBSAG Negative 2023    SCL67UMHP Non-reactive 2024    RPR Non-reactive 2024    OBGLUCOSESCR 76 2024    STREPBCULT Negative 2024       Fentanyl, Urine   Date Value Ref Range Status   2024 Negative @VARINDER Negative Final     Comment:     The cut-off value is 5.0 ng/mL. This is a screening test. If results   do not   correlate with clinical presentation then a confirmatory send out   test is   advised. This result is intended for use in clinical management. It   is not   intended for use in employment related testing.          Benzodiazepines   Date Value Ref Range Status   07/13/2024 Negative Negative Final     Cocaine (Metab.)   Date Value Ref Range Status   07/13/2024 Negative Negative Final     Opiate Scrn, Ur   Date Value Ref Range Status   07/13/2024 Negative Negative Final     Barbiturate Screen, Ur   Date Value Ref Range Status   07/13/2024 Negative Negative Final     Amphetamine Screen, Ur   Date Value Ref Range Status   07/13/2024 Negative Negative Final     THC   Date Value Ref Range Status   07/13/2024 Presumptive Positive (A) Negative Final     Phencyclidine   Date Value Ref Range Status   07/13/2024 Negative Negative Final     BUPRENORPHINE   Date Value Ref Range Status   07/14/2024 Negative  Final     Comment:     Buprenorphine urine screening threshold: 5 ng/mL.    This report is intended for use in clinical monitoring and management   of   patients only.          SPECIMENS  Specimen (24h ago, onward)      None             Antibiotics (From admission, onward)      None            INPATIENT MEDICATIONS  Current Facility-Administered Medications   Medication Dose Route Frequency Provider Last Rate Last Admin    0.9%  NaCl infusion   Intra-Catheter PRN Boroklyn Kraft MD        benzocaine-lanolin (DERMOPLAST) topical spray   Topical (Top) Continuous PRN Brooklyn Kraft MD        calcium carbonate 200 mg calcium (500 mg) chewable tablet 500 mg  500 mg Oral TID PRN Brooklyn Kraft MD   500 mg at 07/14/24 0215    carboprost injection 250 mcg  250 mcg Intramuscular Q15 Min PRN Brooklyn Kraft MD        diphenhydrAMINE capsule 25 mg  25 mg Oral Q4H PRN Brooklyn Kraft MD        diphenoxylate-atropine 2.5-0.025 mg per tablet 2 tablet  2 tablet Oral Q6H PRN Brooklyn Kraft MD        docusate sodium capsule 200 mg  200 mg Oral BID PRN Brooklyn Kraft MD        fentanyl 2 mcg/mL with BUPivacaine 0.1% in sodium chloride 0.9% Epidural  10 mL/hr Epidural Continuous  Brooklyn Kraft MD   Stopped at 07/14/24 0100    hydrocortisone 2.5 % rectal cream   Rectal TID PRN Brooklyn Kraft MD        ibuprofen tablet 800 mg  800 mg Oral Q6H PRN Brooklyn Kraft MD        lactated ringers infusion   Intravenous Continuous Brooklyn Kraft  mL/hr at 07/13/24 2330 New Bag at 07/13/24 2330    lanolin cream   Topical (Top) PRN Brooklyn Kraft MD        LIDOcaine HCL 10 mg/ml (1%) injection 10 mL  10 mL Intradermal Once PRN Brooklyn Kraft MD        measles, mumps and rubella vaccine 1,000-12,500 TCID50/0.5 mL injection 0.5 mL  0.5 mL Subcutaneous vaccine x 1 dose Brooklyn Kraft MD        methylergonovine injection 200 mcg  200 mcg Intramuscular Once PRN Brooklyn Kraft MD        miSOPROStoL tablet 800 mcg  800 mcg Rectal Once PRN Brooklyn Kraft MD        miSOPROStoL tablet 800 mcg  800 mcg Oral Once PRN Brooklyn Kraft MD        ondansetron disintegrating tablet 8 mg  8 mg Oral Q8H PRN Brooklyn Kraft MD        oxyCODONE-acetaminophen  mg per tablet 1 tablet  1 tablet Oral Q4H PRN Brooklyn Kraft MD        oxyCODONE-acetaminophen 5-325 mg per tablet 1 tablet  1 tablet Oral Q4H PRN Brooklyn Kraft MD        oxytocin 30 units in 500 mL normal saline infusion (loading dose)  10.0002 Units Intravenous Once PRN Brooklyn Kraft MD        oxytocin 30 units in 500 mL normal saline infusion  95 felicitas-units/min Intravenous Once Brooklyn Kraft MD        oxytocin 30 units in 500 mL normal saline infusion  95 felicitas-units/min Intravenous Once Brooklyn Kraft MD 95 mL/hr at 07/14/24 0132 95 felicitas-units/min at 07/14/24 0132    oxytocin 30 units in 500 mL normal saline infusion  95 felicitas-units/min Intravenous Once PRN Leola Kraftica Neelima, MD        oxytocin injection 10 Units  10 Units Intramuscular Once PRN Brooklyn Kraft MD        prenatal  "vitamin oral tablet  1 tablet Oral Daily Brooklyn Kraft MD        simethicone chewable tablet 80 mg  1 tablet Oral Q6H PRN Brooklyn Kraft MD        Tdap vaccine injection 0.5 mL  0.5 mL Intramuscular vaccine x 1 dose Brooklyn Kraft MD        tranexamic acid in NaCl,iso-os IVPB 1,000 mg  1,000 mg Intravenous Q30 Min PRN Brooklyn Kraft MD           OUTPATIENT MEDICATIONS  Current Outpatient Medications   Medication Instructions    ferrous sulfate (FEOSOL) 325 mg, Oral, With breakfast    ondansetron (ZOFRAN-ODT) 8 MG TbDL DISSOLVE 1 TABLET(8 MG) ON THE TONGUE EVERY 8 HOURS AS NEEDED FOR NAUSEA    prenatal no122/iron/folic acid (PRENATAL MULTI ORAL) Oral    valACYclovir (VALTREX) 500 mg, Oral, Daily       VITAL SIGNS (Most Recent)  Temp: 98 °F (36.7 °C) (24 0115)  Pulse: 73 (24 0230)  Resp: 16 (24 0230)  BP: 117/66 (24 0230)  SpO2: 99 % (24 2349)  Temp (36hrs), Av °F (36.7 °C), Min:98 °F (36.7 °C), Max:98 °F (36.7 °C)      Delivery Information for Kelsey Mckay    Birth information:  YOB: 2022   Time of birth: 12:53 AM   Sex: female   Head Delivery Date/Time: 2024 12:53 AM   Delivery type: Vaginal, Spontaneous   Gestational Age: 39w6d        Delivery Providers    Delivering clinician: Brooklyn Kraft MD   Provider Role    Rui Palmer RN Registered Nurse    Evon Fraire RN Registered Nurse    Silvia Chavez, Patient Care Assistant Scrub Person    Destini Ellington RN Registered Nurse    Cris Yarbrough RN Registered Nurse    Tamiko Gomes RN Registered Nurse              Measurements    Weight: 3020 g  Weight (lbs): 6 lb 10.5 oz  Length: 49.5 cm  Length (in): 19.5"         Apgars    Living status: Living  Apgar Component Scores:  1 min.:  5 min.:  10 min.:  15 min.:  20 min.:    Skin color:  1  1       Heart rate:  2  2       Reflex irritability:  2  2       Muscle tone:  2  2       Respiratory effort:  1  1 "       Total:  8  8       Apgars assigned by: WIN REGALADO RN         Operative Delivery    Forceps attempted?: No  Vacuum extractor attempted?: No         Shoulder Dystocia    Shoulder dystocia present?: No           Presentation    Presentation: Vertex  Position: Left Occiput Posterior           Interventions/Resuscitation    Method: Bulb Suctioning, Tactile Stimulation, CPAP, Deep Suctioning       Cord    Vessels: 3 vessels  Complications: Nuchal  Nuchal Intervention: reduced  Nuchal Cord Description: loose nuchal cord  Number of Loops: 1  Delayed Cord Clamping?: Yes  Cord Clamped Date/Time: 2024 12:55 AM  Cord Blood Disposition: Sent with Baby  Gases Sent?: No  Stem Cell Collection (by MD): No       Placenta    Placenta delivery date/time: 2024 0058  Placenta removal: Spontaneous  Placenta appearance: Intact  Placenta disposition: Discarded           Labor Events:       labor: No     Labor Onset Date/Time: 2024 22:40     Dilation Complete Date/Time: 2024 00:30     Start Pushing Date/Time: 2024 00:31     Rupture Date/Time: 24  0030         Rupture type: SRM (Spontaneous Rupture)         Fluid Amount:       Fluid Color: Green       steroids: None     Antibiotics given for GBS: No     Induction: none     Indications for induction:        Augmentation:       Indications for augmentation:       Labor complications: None     Additional complications:          Cervical ripening:                     Delivery:      Episiotomy: None     Indication for Episiotomy:       Perineal Lacerations: None Repaired:      Periurethral Laceration:   Repaired:     Labial Laceration:   Repaired:     Sulcus Laceration:   Repaired:     Vaginal Laceration:   Repaired:     Cervical Laceration:   Repaired:     Repair suture: None     Repair # of packets: 0     Last Value - EBL - Nursing (mL):       Sum - EBL - Nursing (mL): 0     Last Value - EBL - Anesthesia (mL):      Calculated QBL (mL): 50       Running total QBL (mL): 131      Vaginal Sweep Performed: Yes     Surgicount Correct: Yes       Other providers:       Anesthesia    Method: Epidural          Details (if applicable):  Trial of Labor      Categorization:      Priority:     Indications for :     Incision Type:       Additional  information:  Forceps:    Vacuum:    Breech:    Observed anomalies    Other (Comments):           Time ruptured: 0h 23m    SKIN TO SKIN                INFANT FEEDING       PAST MEDICAL HISTORY   Past Medical History:   Diagnosis Date    Anemia     Herpes simplex virus (HSV) infection     Rubella non-immune status, antepartum         PAST SURGICAL HISTORY    Past Surgical History:   Procedure Laterality Date    DILATION AND CURETTAGE OF UTERUS USING SUCTION N/A 2021    Procedure: DILATION AND CURETTAGE, UTERUS, USING SUCTION;  Surgeon: Gilbert Rodriguez MD;  Location: [facility] OR;  Service: OB/GYN;  Laterality: N/A;    OPEN REDUCTION AND INTERNAL FIXATION (ORIF) OF FRACTURE OF CARPAL BONE Right 2023    Procedure: ORIF Scaphoid Right Wrist;  Surgeon: Oscar Bhandari DO;  Location: [facility] OR;  Service: Orthopedics;  Laterality: Right;    staph      staph infection abdomen        SOCIAL HISTORY    Social History     Tobacco Use    Smoking status: Some Days     Current packs/day: 0.00     Types: Cigarettes, Vaping w/o nicotine     Last attempt to quit:      Years since quitting: 3.5     Passive exposure: Past    Smokeless tobacco: Never   Substance Use Topics    Alcohol use: Not Currently    Drug use: Yes     Frequency: 2.0 times per week     Comment: ALESSIA Palmer RN  Date of Service: 2024  2:57 AM

## 2024-07-14 NOTE — NURSING
OBGYN LABS ENTERED INTO RESULTS CONSOLE      1st Trimester Labs Entered Into Results Console     [] AOBRH   [] Rubella Immune   [] RPR   [] HBsAg   [] HIV   [x] Chlamydia   [x] Gonorrhea   [] Cell-Free DNA   [] Hep-C   [] Varicella    2nd Trimester Labs Entered Into Results Console     []OB Glucose Screen      3rd Trimester Labs Entered Into Results Console      [] GBS   [] RPR    Drug Screen Entered Into Results Console     [] Benzodiazes   [] Methadone   [] Cocaine (Metab)   [] Opiate   [] Amphetamine   [] Marijuana   [] Creatinine   [] Amphetamines-Beaker   [] Barbituates-Beaker   [] Benzodiazepine-Beaker   [] Cannabinoids-Beaker   [] Cocaine-Beaker   [] Fentanyl-Beaker   [] MDMA-Beaker   [] Opiates-Beaker   [] Phencyclidine-Beaker        Results Entered by Rui Palmer RN    Results Verified for Manual Entry Accuracy by PEREZ Morrison

## 2024-07-14 NOTE — NURSING
[facility]  Department of Obstetrics and Gynecology  PATIENT NAME: Tosin Mckay  MRN: [id number]  TODAY'S DATE: 2024    CHIEF COMPLAINT: Rupture of Membranes      OB History    Para Term  AB Living   7 4 3 1 3 4   SAB IAB Ectopic Multiple Live Births   0 0 0 0 4      # Outcome Date GA Lbr Colby/2nd Weight Sex Type Anes PTL Lv   7 Term 24 39w6d 01:50 / 00:23 3.02 kg (6 lb 10.5 oz) F Vag-Spont EPI N JIM      Name: Kelsey Mckay      Apgar1: 8  Apgar5: 8   6 AB            5 AB            4 AB 2020           3  18 36w4d  2.56 kg (5 lb 10.3 oz) F Vag-Spont EPI Y JIM      Name: DEMETRICE,KELSEY AGUSTIN      Apgar1: 8  Apgar5: 9   2 Term 17   3.09 kg (6 lb 13 oz) F Vag-Spont   JIM   1 Term 14   2.92 kg (6 lb 7 oz) M Vag-Spont   JIM     Past Medical History:   Diagnosis Date    Anemia     Herpes simplex virus (HSV) infection     Rubella non-immune status, antepartum      Past Surgical History:   Procedure Laterality Date    DILATION AND CURETTAGE OF UTERUS USING SUCTION N/A 2021    Procedure: DILATION AND CURETTAGE, UTERUS, USING SUCTION;  Surgeon: Gilbert Rodriguez MD;  Location: [facility] OR;  Service: OB/GYN;  Laterality: N/A;    OPEN REDUCTION AND INTERNAL FIXATION (ORIF) OF FRACTURE OF CARPAL BONE Right 2023    Procedure: ORIF Scaphoid Right Wrist;  Surgeon: Oscar Bhandari DO;  Location: [facility] OR;  Service: Orthopedics;  Laterality: Right;    staph      staph infection abdomen     Social History     Tobacco Use    Smoking status: Some Days     Current packs/day: 0.00     Types: Cigarettes, Vaping w/o nicotine     Last attempt to quit:      Years since quitting: 3.5     Passive exposure: Past    Smokeless tobacco: Never   Substance Use Topics    Alcohol use: Not Currently    Drug use: Yes     Frequency: 2.0 times per week     Comment: THC gummies       Prenatal Labs  Lab Results   Component Value Date    GROUPTRH O POS  07/13/2024    HGB 10.1 (L) 07/13/2024    HCT 31.6 (L) 07/13/2024     07/13/2024    RUBELLAIMMUN Non-Reactive (A) 02/12/2018    HEPBSAG Negative 12/20/2023    NYV52RXQZ Non-reactive 04/26/2024    RPR Non-reactive 04/26/2024    OBGLUCOSESCR 76 04/26/2024    STREPBCULT Negative 06/21/2024       VITAL SIGNS - ABNORMAL VITALS INCLUDE TEMP >100.4,RR <12 or >26, SUSTAINED MATERNAL PULSE <60 or >120     VITAL SIGNS (Most Recent)  Temp: 98 °F (36.7 °C) (07/14/24 0115)  Pulse: 73 (07/14/24 0230)  Resp: 16 (07/14/24 0230)  BP: 117/66 (07/14/24 0230)  SpO2: 99 % (07/13/24 2349)    OUTPATIENT MEDICATIONS  Current Outpatient Medications   Medication Instructions    ferrous sulfate (FEOSOL) 325 mg, Oral, With breakfast    ondansetron (ZOFRAN-ODT) 8 MG TbDL DISSOLVE 1 TABLET(8 MG) ON THE TONGUE EVERY 8 HOURS AS NEEDED FOR NAUSEA    prenatal no122/iron/folic acid (PRENATAL MULTI ORAL) Oral    valACYclovir (VALTREX) 500 mg, Oral, Daily       Rui Palmer RN  [facility]  07/14/2024

## 2024-07-14 NOTE — PLAN OF CARE
No needs have been identified at this time.         07/14/24 0852   Discharge Planning   Assessment Type Discharge Planning Assessment   Resource/Environmental Concerns none   Support Systems Spouse/significant other   Equipment Currently Used at Home none   Current Living Arrangements home   Patient/Family Anticipates Transition to home   Patient/Family Anticipated Services at Transition none   DME Needed Upon Discharge  none   Discharge Plan A Home   Discharge Plan B Home

## 2024-07-14 NOTE — NURSING TRANSFER
Nursing Transfer Note      7/14/2024   2:57 AM    Nurse giving handoff: Rui Palmer RN  Nurse receiving handoff:     Reason patient is being transferred: postpartum care    Transfer To: 21    Transfer via wheelchair    Transfer with patient belongings, IVF and infant    Transported by RN and FOB    Transfer Vital Signs:  Blood Pressure:115/59  Heart Rate:86  O2: 99 %  Temperature: 98.0F  Respirations: 18    Telemetry: N/A  Order for Tele Monitor? No    Additional Lines: none    4eyes on Skin: yes    Medicines sent: IVF    Any special needs or follow-up needed: postpartum care    Patient belongings transferred with patient: Yes    Chart send with patient: Yes    Notified: FODONNA    Patient reassessed at: 0340 (date, time)  1  Upon arrival to floor: patient oriented to room, call bell in reach, and bed in lowest position

## 2024-07-14 NOTE — L&D DELIVERY NOTE
[facility]  Vaginal Delivery   Operative Note    SUMMARY   Patient labored to completion and began to push for spontaneous vaginal delivery.  Fetal head delivered over intact perineum in the OP position.  Nuchal cord reduced at perineum.  With gentle downward traction the anterior shoulder delivered without difficulty followed by the posterior shoulder and remaining body.  Infant was placed on maternal abdomen for skin to skin and bulb suctioned.  Meconium noted. Delayed cord clamping was performed and cord blood collected.  Spontaneous delivery of placenta and IV Pitocin initiated with good uterine tone. No laceration noted. Patient tolerated delivery well.  Sponge, needle, and lap count correct. Placenta inspected and noted to be intact.     QBL pending  EBL 150cc      Indications: labor  Pregnancy complicated by:   Patient Active Problem List   Diagnosis    Cystic fibrosis carrier    Missed     Nexplanon insertion    Rh negative state in antepartum period, first trimester    Closed transverse fracture of waist of scaphoid bone of right wrist, initial encounter     (spontaneous vaginal delivery)     Admitting GA: 39w6d    Delivery Information for Kelsey Mckay    Birth information:  YOB: 2022   Time of birth: 12:53 AM   Sex: female   Head Delivery Date/Time: 2024 12:53 AM   Delivery type: Vaginal, Spontaneous   Gestational Age: 39w6d        Delivery Providers    Delivering clinician: Brooklyn Kraft MD   Provider Role    Rui Palmer RN Registered Nurse    Evon Fraire RN Registered Nurse    Silvia Chavez, Patient Care Assistant Scrub Person    Destini Ellington RN Registered Nurse    Cris Yarbrough RN Registered Nurse    Tamiko Gomes RN Registered Nurse              Measurements    Weight:   Length:          Apgars    Living status: Living  Apgar Component Scores:  1 min.:  5 min.:  10 min.:  15 min.:  20 min.:    Skin color:         Heart rate:          Reflex irritability:         Muscle tone:         Respiratory effort:         Total:         Apgars assigned by: WIN REGALADO RN         Operative Delivery    Forceps attempted?: No  Vacuum extractor attempted?: No         Shoulder Dystocia    Shoulder dystocia present?: No           Presentation    Presentation: Vertex  Position: Left Occiput Posterior           Interventions/Resuscitation    Method: Bulb Suctioning, Tactile Stimulation, CPAP       Cord    Vessels: 3 vessels  Complications: Nuchal  Nuchal Intervention: reduced  Nuchal Cord Description: loose nuchal cord  Number of Loops: 1  Delayed Cord Clamping?: Yes  Cord Clamped Date/Time: 2024 12:55 AM  Cord Blood Disposition: Sent with Baby  Gases Sent?: No  Stem Cell Collection (by MD): No       Placenta    Placenta delivery date/time: 2024 0058  Placenta removal: Spontaneous  Placenta appearance: Intact  Placenta disposition: Discarded           Labor Events:       labor: No     Labor Onset Date/Time:         Dilation Complete Date/Time: 2024 00:30     Start Pushing Date/Time: 2024 00:31       Start Pushing Date/Time: 2024 00:31     Rupture Date/Time: 24  0030         Rupture type: SRM (Spontaneous Rupture)         Fluid Amount:       Fluid Color: Green               steroids: None     Antibiotics given for GBS: No     Induction: none     Indications for induction:        Augmentation:       Indications for augmentation:       Labor complications: None     Additional complications:          Cervical ripening:                     Delivery:      Episiotomy: None     Indication for Episiotomy:       Perineal Lacerations:   Repaired:      Periurethral Laceration:   Repaired:     Labial Laceration:   Repaired:     Sulcus Laceration:   Repaired:     Vaginal Laceration:   Repaired:     Cervical Laceration:   Repaired:     Repair suture:       Repair # of packets:       Last Value - EBL - Nursing (mL):        Sum - EBL - Nursing (mL): 0     Last Value - EBL - Anesthesia (mL):      Calculated QBL (mL):       Running total QBL (mL):       Vaginal Sweep Performed:       Surgicount Correct:         Other providers:       Anesthesia    Method: Epidural          Details (if applicable):  Trial of Labor      Categorization:      Priority:     Indications for :     Incision Type:       Additional  information:  Forceps:    Vacuum:    Breech:    Observed anomalies    Other (Comments):       Brooklyn Kraft MD  Obstetrics and Gynecology  [facility]

## 2024-07-14 NOTE — PLAN OF CARE
VSS. Uterus firm w/o massage, bleeding continues to improve. Pt ambulating independently, voiding spontaneously. Pain managed adequately w/medication. Plan of care reviewed w/pt and significant other. No new concerns expressed at this time. Will continue to monitor appropriately.     ----------------------------------  Problem: Adult Inpatient Plan of Care  Goal: Plan of Care Review  Outcome: Progressing  Goal: Patient-Specific Goal (Individualized)  Outcome: Progressing  Goal: Absence of Hospital-Acquired Illness or Injury  Outcome: Progressing  Goal: Optimal Comfort and Wellbeing  Outcome: Progressing  Goal: Readiness for Transition of Care  Outcome: Progressing     Problem:  Fall Injury Risk  Goal: Absence of Fall, Infant Drop and Related Injury  Outcome: Progressing  Intervention: Identify and Manage Contributors  Flowsheets (Taken 2024)  Self-Care Promotion: independence encouraged  Medication Review/Management: medications reviewed  Intervention: Prevent  Drop or Fall  Flowsheets (Taken 2024)  Infant Drop Prevention:   placed in crib for maternal rest   placed in crib for transport   room lighting adjusted   support person presence encouraged   safety rounds completed     Problem: Infection  Goal: Absence of Infection Signs and Symptoms  Outcome: Progressing  Intervention: Prevent or Manage Infection  Flowsheets (Taken 2024)  Infection Management: aseptic technique maintained     Problem: Postpartum (Vaginal Delivery)  Goal: Successful Parent Role Transition  Outcome: Progressing  Intervention: Support Parent Role Transition  Flowsheets (Taken 2024)  Supportive Measures:   active listening utilized   self-reflection promoted   self-responsibility promoted   positive reinforcement provided   counseling provided   decision-making supported   problem-solving facilitated   verbalization of feelings encouraged   relaxation techniques promoted   goal-setting  facilitated   self-care encouraged  Parent-Child Attachment Promotion:   caring behavior modeled   parent/caregiver presence encouraged   skin-to-skin contact encouraged   strengths emphasized   participation in care promoted   cue recognition promoted   face-to-face positioning promoted   positive reinforcement provided   interaction encouraged   rooming-in promoted  Goal: Hemostasis  Outcome: Progressing  Intervention: Monitor Bleeding  Flowsheets (Taken 7/14/2024 0600)  Syncope Management: position changed slowly  Intervention: Manage Bleeding  Flowsheets (Taken 7/14/2024 0600)  Syncope Management: position changed slowly  Goal: Absence of Infection Signs and Symptoms  Outcome: Progressing  Intervention: Prevent or Manage Infection  Flowsheets (Taken 7/14/2024 0600)  Infection Management: aseptic technique maintained  Goal: Optimal Pain Control and Function  Outcome: Progressing

## 2024-07-14 NOTE — ANESTHESIA PREPROCEDURE EVALUATION
07/13/2024  Tosin Mckay is a 28 y.o., female.      Results for orders placed or performed during the hospital encounter of 12/12/23   EKG 12-lead    Collection Time: 12/12/23 10:46 PM    Narrative    Test Reason : R55,    Vent. Rate : 066 BPM     Atrial Rate : 066 BPM     P-R Int : 118 ms          QRS Dur : 082 ms      QT Int : 388 ms       P-R-T Axes : 031 080 056 degrees     QTc Int : 406 ms    Normal sinus rhythm  Normal ECG  When compared with ECG of 03-AUG-2021 12:20,  Nonspecific T wave abnormality no longer evident in Anterior leads  Confirmed by Drew Godfrey MD ([id number]) on 12/13/2023 8:33:24 AM    Referred By: [email or web address]   SELF           Confirmed By:Drew Godfrey MD             Lab Results   Component Value Date    WBC 14.08 (H) 07/13/2024    HGB 10.1 (L) 07/13/2024    HCT 31.6 (L) 07/13/2024    MCV 78 (L) 07/13/2024     07/13/2024          Pre-op Assessment    I have reviewed the Patient Summary Reports.     I have reviewed the Nursing Notes. I have reviewed the NPO Status.   I have reviewed the Medications.     Review of Systems  Anesthesia Hx:  No problems with previous Anesthesia             Denies Family Hx of Anesthesia complications.    Denies Personal Hx of Anesthesia complications.                    Social:  Former Smoker, No Alcohol Use       Hematology/Oncology:    Oncology Normal    -- Anemia:                                  EENT/Dental:  EENT/Dental Normal           Cardiovascular:  Cardiovascular Normal                                            Pulmonary:  Pulmonary Normal                       Renal/:  Renal/ Normal                 Hepatic/GI:  Hepatic/GI Normal                 Musculoskeletal:  Musculoskeletal Normal                Neurological:  Neurology Normal                                      Endocrine:  Endocrine Normal            Dermatological:  Skin Normal   Pt calling saying she was seen earlier today, went to [facility] to  her meds, and was told Dr Urszula Mooney canceled her Oxycodone from her pain mgmt     She would like to know what is going on   Psych:  Psychiatric Normal                    Physical Exam  General: Well nourished, Alert, Cooperative and Oriented    Airway:  Mallampati: II   Mouth Opening: Normal  TM Distance: > 6 cm  Tongue: Normal  Neck ROM: Normal ROM    Dental:  Intact    Chest/Lungs:  Clear to auscultation, Normal Respiratory Rate    Heart:  Rate: Normal  Rhythm: Regular Rhythm        Anesthesia Plan  Type of Anesthesia, risks & benefits discussed:    Anesthesia Type: Epidural  Intra-op Monitoring Plan: Standard ASA Monitors  Informed Consent: Informed consent signed with the Patient and all parties understand the risks and agree with anesthesia plan.  All questions answered. Patient consented to blood products? Yes  ASA Score: 2  Anesthesia Plan Notes:       Labor Epidural     Ready For Surgery From Anesthesia Perspective.     .

## 2024-07-14 NOTE — ANESTHESIA PROCEDURE NOTES
Epidural    Patient location during procedure: OB   Reason for block: primary anesthetic   Reason for block: labor analgesia requested by patient and obstetrician  Diagnosis: IUP   Start time: 7/13/2024 10:59 PM  Timeout: 7/13/2024 10:59 PM  End time: 7/13/2024 11:12 PM    Staffing  Performing Provider: Sohan Tillman MD  Authorizing Provider: Sohan Tillman MD    Staffing  Other anesthesia staff: Sohan Tillman MD  Performed by: Sohan Tillman MD  Authorized by: Sohan Tillman MD        Preanesthetic Checklist  Completed: patient identified, IV checked, site marked, risks and benefits discussed, surgical consent, monitors and equipment checked, pre-op evaluation, timeout performed, anesthesia consent given, hand hygiene performed and patient being monitored  Preparation  Patient position: sitting  Prep: Betadine  Patient monitoring: ECG, Pulse Ox and Blood Pressure  Reason for block: primary anesthetic   Epidural  Skin Anesthetic: lidocaine 1%  Skin Wheal: 5 mL  Administration type: continuous  Approach: midline  Interspace: L3-4    Injection technique: CJ air  Needle and Epidural Catheter  Needle type: Tuohy   Needle gauge: 17  Needle length: 3.5 inches  Needle insertion depth: 5 cm  Catheter type: multi-orifice and springwound  Catheter size: 19 G  Catheter at skin depth: 9 cm  Insertion Attempts: 1  Test dose: 3 mL of lidocaine 1.5% with Epi 1-to-200,000  Additional Documentation: incremental injection, negative aspiration for heme and CSF, no paresthesia on injection, no signs/symptoms of IV or SA injection, no significant complaints from patient and no significant pain on injection  Needle localization: anatomical landmarks  Medications:  Volume per aspiration: 3 mL  Time between aspirations: 1 minutes   Assessment  Ease of block: easy  Patient's tolerance of the procedure: comfortable throughout block and no complaints  Additional Notes  The patient was ID and examined with chart and labs reviewed.  "The risk benefits alternatives to the epidural were discussed with the patient with all questions answered and the patient asked if she desired the epidural and she responded "yes". The consents were signed and a timeout performed.      No inadvertent dural puncture with Tuohy.  Dural puncture not performed with spinal needle              "

## 2024-07-15 VITALS
DIASTOLIC BLOOD PRESSURE: 69 MMHG | OXYGEN SATURATION: 96 % | WEIGHT: 150 LBS | RESPIRATION RATE: 18 BRPM | TEMPERATURE: 98 F | BODY MASS INDEX: 27.6 KG/M2 | HEIGHT: 62 IN | SYSTOLIC BLOOD PRESSURE: 106 MMHG | HEART RATE: 70 BPM

## 2024-07-15 LAB
BASOPHILS # BLD AUTO: 0.06 K/UL (ref 0–0.2)
BASOPHILS NFR BLD: 0.6 % (ref 0–1.9)
DIFFERENTIAL METHOD BLD: ABNORMAL
EOSINOPHIL # BLD AUTO: 0.1 K/UL (ref 0–0.5)
EOSINOPHIL NFR BLD: 0.9 % (ref 0–8)
ERYTHROCYTE [DISTWIDTH] IN BLOOD BY AUTOMATED COUNT: 13.2 % (ref 11.5–14.5)
HCT VFR BLD AUTO: 30.9 % (ref 37–48.5)
HGB BLD-MCNC: 9.5 G/DL (ref 12–16)
IMM GRANULOCYTES # BLD AUTO: 0.13 K/UL (ref 0–0.04)
IMM GRANULOCYTES NFR BLD AUTO: 1.3 % (ref 0–0.5)
LYMPHOCYTES # BLD AUTO: 3.2 K/UL (ref 1–4.8)
LYMPHOCYTES NFR BLD: 32.9 % (ref 18–48)
MCH RBC QN AUTO: 24.3 PG (ref 27–31)
MCHC RBC AUTO-ENTMCNC: 30.7 G/DL (ref 32–36)
MCV RBC AUTO: 79 FL (ref 82–98)
MONOCYTES # BLD AUTO: 0.6 K/UL (ref 0.3–1)
MONOCYTES NFR BLD: 6 % (ref 4–15)
NEUTROPHILS # BLD AUTO: 5.7 K/UL (ref 1.8–7.7)
NEUTROPHILS NFR BLD: 58.3 % (ref 38–73)
NRBC BLD-RTO: 0 /100 WBC
PLATELET # BLD AUTO: 226 K/UL (ref 150–450)
PMV BLD AUTO: 10.9 FL (ref 9.2–12.9)
RBC # BLD AUTO: 3.91 M/UL (ref 4–5.4)
WBC # BLD AUTO: 9.84 K/UL (ref 3.9–12.7)

## 2024-07-15 PROCEDURE — 36415 COLL VENOUS BLD VENIPUNCTURE: CPT | Performed by: STUDENT IN AN ORGANIZED HEALTH CARE EDUCATION/TRAINING PROGRAM

## 2024-07-15 PROCEDURE — 25000003 PHARM REV CODE 250: Performed by: STUDENT IN AN ORGANIZED HEALTH CARE EDUCATION/TRAINING PROGRAM

## 2024-07-15 PROCEDURE — 85025 COMPLETE CBC W/AUTO DIFF WBC: CPT | Performed by: STUDENT IN AN ORGANIZED HEALTH CARE EDUCATION/TRAINING PROGRAM

## 2024-07-15 RX ORDER — IBUPROFEN 800 MG/1
800 TABLET ORAL EVERY 6 HOURS PRN
Qty: 60 TABLET | Refills: 0 | Status: SHIPPED | OUTPATIENT
Start: 2024-07-15 | End: 2024-07-15

## 2024-07-15 RX ORDER — IBUPROFEN 800 MG/1
800 TABLET ORAL EVERY 6 HOURS PRN
Qty: 60 TABLET | Refills: 0 | Status: SHIPPED | OUTPATIENT
Start: 2024-07-15

## 2024-07-15 RX ORDER — OXYCODONE AND ACETAMINOPHEN 5; 325 MG/1; MG/1
1 TABLET ORAL EVERY 4 HOURS PRN
Qty: 10 TABLET | Refills: 0 | Status: SHIPPED | OUTPATIENT
Start: 2024-07-15 | End: 2024-07-15

## 2024-07-15 RX ORDER — OXYCODONE AND ACETAMINOPHEN 5; 325 MG/1; MG/1
1 TABLET ORAL EVERY 4 HOURS PRN
Qty: 10 TABLET | Refills: 0
Start: 2024-07-15

## 2024-07-15 RX ADMIN — OXYCODONE HYDROCHLORIDE AND ACETAMINOPHEN 1 TABLET: 10; 325 TABLET ORAL at 01:07

## 2024-07-15 RX ADMIN — IBUPROFEN 800 MG: 400 TABLET ORAL at 01:07

## 2024-07-15 RX ADMIN — OXYCODONE HYDROCHLORIDE AND ACETAMINOPHEN 1 TABLET: 10; 325 TABLET ORAL at 06:07

## 2024-07-15 NOTE — PHYSICIAN QUERY
Question: Please further specify the diagnosis associated with the documentation of Herpes Simplex Virus (HSV).     Provider Query Response:  Unspecified genital HSV

## 2024-07-15 NOTE — PROGRESS NOTES
Vaginal Delivery Postpartum Day 1     Tosin Mckay is doing well without complaints. Pain well controlled. Tolerating regular diet. Ambulating and voiding without difficulty. She denies any problems with pp blues. States bleeding is not heavy.     OBJECTIVE:     Vitals:    07/15/24 0415 07/15/24 0621 07/15/24 0850 07/15/24 1308   BP: 113/75  106/69    BP Location: Right arm  Right arm    Patient Position: Lying  Sitting    Pulse: 73  70    Resp: 17 18 18 18   Temp: 98 °F (36.7 °C)  97.8 °F (36.6 °C)    TempSrc: Oral  Oral    SpO2: 98%  96%    Weight:       Height:            I & O (Last 24H):  Intake/Output Summary (Last 24 hours)  No intake or output data in the 24 hours ending 07/15/24 1335      Physical Exam:  General: NAD, AAO   CV: Regular rate   Resp:   Nonlabored respirations   Abdomen: Soft, appropriately tender    Fundus: Firm   Lochia:  Appropriate   DVT Evaluation: No evidence of DVT on either side seen on physical exam.  No calf tenderness     Labs:  CBC:   Lab Results   Component Value Date/Time    WBC 9.84 07/15/2024 04:50 AM    RBC 3.91 (L) 07/15/2024 04:50 AM    HGB 9.5 (L) 07/15/2024 04:50 AM    HCT 30.9 (L) 07/15/2024 04:50 AM     07/15/2024 04:50 AM    MCV 79 (L) 07/15/2024 04:50 AM    MCH 24.3 (L) 07/15/2024 04:50 AM    MCHC 30.7 (L) 07/15/2024 04:50 AM       ABO/Rh  O POS        ASSESSMENT/PLAN:     S/p vaginal delivery, PPD# 1. Doing well.   Patient Active Problem List   Diagnosis    Cystic fibrosis carrier    Missed     Nexplanon insertion    Rh negative state in antepartum period, first trimester    Closed transverse fracture of waist of scaphoid bone of right wrist, initial encounter     (spontaneous vaginal delivery)        Routine postpartal care   Baby doing well  Dispo: anticipate discharge today per pt request as she is meeting all criteria    Brooklyn Kraft MD  Obstetrics and Gynecology  [facility]

## 2024-07-15 NOTE — PLAN OF CARE
Problem: Adult Inpatient Plan of Care  Goal: Plan of Care Review  Outcome: Met  Goal: Patient-Specific Goal (Individualized)  Outcome: Met  Goal: Absence of Hospital-Acquired Illness or Injury  Outcome: Met  Goal: Optimal Comfort and Wellbeing  Outcome: Met  Goal: Readiness for Transition of Care  Outcome: Met     Problem:  Fall Injury Risk  Goal: Absence of Fall, Infant Drop and Related Injury  Outcome: Met     Problem: Infection  Goal: Absence of Infection Signs and Symptoms  Outcome: Met     Problem: Postpartum (Vaginal Delivery)  Goal: Successful Parent Role Transition  Outcome: Met  Goal: Hemostasis  Outcome: Met  Goal: Absence of Infection Signs and Symptoms  Outcome: Met  Goal: Optimal Pain Control and Function  Outcome: Met

## 2024-07-15 NOTE — PLAN OF CARE
07/15/24 1519   Final Note   Assessment Type Final Discharge Note   Anticipated Discharge Disposition Home   What phone number can be called within the next 1-3 days to see how you are doing after discharge? [phone]   Post-Acute Status   Discharge Delays None known at this time     Discharge orders and chart reviewed with no further post-acute discharge needs identified at this time.  At this time, patient is cleared for discharge from Case Management standpoint.

## 2024-07-15 NOTE — ANESTHESIA POSTPROCEDURE EVALUATION
Anesthesia Post Evaluation    Patient: Tosin Mckay    Procedure(s) Performed: * No procedures listed *    Final Anesthesia Type: epidural      Patient location during evaluation: floor  Patient participation: Yes- Able to Participate  Level of consciousness: awake and alert and oriented  Post-procedure vital signs: reviewed and stable  Pain management: adequate  Airway patency: patent    PONV status at discharge: No PONV  Anesthetic complications: no      Cardiovascular status: blood pressure returned to baseline and hemodynamically stable  Respiratory status: unassisted, spontaneous ventilation and room air  Hydration status: euvolemic  Follow-up not needed.          Postpartum day #1 status post vaginal delivery with epidural analgesia. This morning patient is resting comfortably in bed, she is alert and oriented and without complaints. Patient denies headache, back pain, leg pain weakness or numbness.  Patient does report minimal soreness needle placement site. Patient was advised that it is normal to have discomfort at the needle placement site and that this should resolve over a period of approximately 2 weeks.  The patient was further advised that should her pain persist or worsen she could always contact [facility] to reach the anesthesiologist on-call, or let her obstetrician know for any further follow-up if needed.  Epidural site examined and no bleeding bruising or discharge noted. No apparent anesthetic related complications. Please reconsult if needed.      Vitals Value Taken Time   /75 07/15/24 0415   Temp 36.7 °C (98 °F) 07/15/24 0415   Pulse 73 07/15/24 0415   Resp 18 07/15/24 0621   SpO2 98 % 07/15/24 0415         No case tracking events are documented in the log.      Pain/Obed Score: Pain Rating Prior to Med Admin: 10 (7/15/2024  6:21 AM)  Pain Rating Post Med Admin: 4 (7/14/2024  7:15 PM)

## 2024-07-15 NOTE — DISCHARGE INSTRUCTIONS
Pelvic rest for 6 weeks (no sex, tampons, douching, nothing in the vagina)    You can experience vaginal bleeding on and off for up to 6 weeks, it will gradually get lighter and the color will change from bright red to a brownish discharge towards the end.    Activity:  NO strenuous activities or exercising.  Do not /lift anything over 15 pounds.   Do not do heavy housework or cleaning.    NO driving for 3 days.  You may take short car trips but do not drive.    You may shower and/or soak in a bathtub, both are acceptable.  Use a mild soap, no heavy perfumes or fragrances to avoid irritation.     If constipation develops:  You may take Colace (stool softener), Milk of Magnesia, Dulcolax or Miralax.  All of these medications are sold over the counter.      Care of Episiotomy:  Local agents such as Tucks pads & Dermoplast spray.  You may also use a Sitz bath: sitting in a tub of warm water for 15 minutes 2-3 times per day will help relieve the discomfort.      Pain Relief:  You may take Motrin for mild pain & uterine cramping.      Emotional Changes:  You may experience baby blues after delivery.  You may feel let down, anxious and cry easily.  This is normal.  These feelings can begin 2-3 days after delivery and usually disappear in about a week or two.  Prolonged sadness may indicate postpartum depression.      Call your doctor for any of the following:  Difficulty breathing, problems with any of your medications, inability to eat.    Foul smelling vaginal discharge.  Temperature above 100.4.    Heavy vaginal bleeding.  All women bleed different after delivery and each delivery is different.  Heavy bleeding consists of saturating a ruchi pad in a 1 hour time period.  Passing clots are normal, if you pass a blood clot larger than the size of a golf ball call your doctor's office.   If you experience pain in your legs/calves, if one leg increases in size and becomes swollen or becomes hot to touch or  discolored.   Crying or periods of sadness beyond 2 weeks.      If you are not breastfeeding:  Wear a tight bra and do not stimulate your breasts.  Avoid handling your breasts and do not express milk.  You may apply ice packs or cabbage leaves to relieve discomfort from engorgement.  If your breasts become warm to touch, reddened or lumps develop call your doctor.

## 2024-07-15 NOTE — DISCHARGE SUMMARY
[facility]  Obstetrics  Discharge Summary      Patient Name: Tosin Mckay  MRN: [id number]  Admission Date: 2024  Hospital Length of Stay: 2 days  Discharge Date and Time:  07/15/2024 1:34 PM  Attending Physician: Brooklyn Kraft,*   Discharging Provider: Brooklyn Kraft MD, MD   Primary Care Provider: Jenna, Primary Doctor      Hospital Course:   8-year-old  89 at 39 weeks and 6 days, on referred from [facility], presented to labor and delivery in active labor.  Underwent an uncomplicated vaginal delivery.  Mother and infant recovered well.  On postpartum day 1 she was meeting all goals and desired discharge. Pain well controlled, tolerating regular diet, ambulating and voiding without difficulty, passing flatus, no heavy bleeding. VSS and exam reassuring.  Precautions reviewed with patient and she will follow up with me.    Vitals:    07/15/24 0415 07/15/24 0621 07/15/24 0850 07/15/24 1308   BP: 113/75  106/69    BP Location: Right arm  Right arm    Patient Position: Lying  Sitting    Pulse: 73  70    Resp: 17 18 18 18   Temp: 98 °F (36.7 °C)  97.8 °F (36.6 °C)    TempSrc: Oral  Oral    SpO2: 98%  96%    Weight:       Height:            Exam:  NAD, AAO  Regular rate  Nonlabored respirations  Ab: fundus firm below the umbilicus, appropriate abdominal tenderness  : appropriate lochia  Extremities:  Nontender calves, no evidence of DVT           Final Active Diagnoses:    Diagnosis Date Noted POA    PRINCIPAL PROBLEM:   (spontaneous vaginal delivery) [O80] 2024 Not Applicable      Problems Resolved During this Admission:        Significant Diagnostic Studies: Labs: CBC   Recent Labs   Lab 24  2222 07/15/24  0450   WBC 14.08* 9.84   HGB 10.1* 9.5*   HCT 31.6* 30.9*    226     Lab Results   Component Value Date    GROUPTRH O POS 2024         Feeding Method: bottle    Immunizations       Date Immunization Status Dose Route/Site Given by    24 0200  "MMR Deferred 0.5 mL Subcutaneous/ Lanie Claudio RN    24 0200 Tdap Deferred 0.5 mL Intramuscular/ Lanie Claudio RN            Delivery:    Episiotomy: None   Lacerations: None   Repair suture: None   Repair # of packets: 0   Blood loss (ml):       Birth information:  YOB: 2022   Time of birth: 12:53 AM   Sex: female   Delivery type: Vaginal, Spontaneous   Gestational Age: 39w6d     Measurements    Weight: 3020 g  Weight (lbs): 6 lb 10.5 oz  Length: 49.5 cm  Length (in): 19.5"         Delivery Clinician: Delivery Providers    Delivering clinician: Brooklyn Kraft MD   Provider Role    Rui Palmer RN Registered Nurse    Win Fraire RN Registered Nurse    Silvia Chavez, Patient Care Assistant Scrub Person    Destini Ellington RN Registered Nurse    Cris Yarbrough RN Registered Nurse    Tamiko Gomes RN Registered Nurse             Additional  information:  Forceps:    Vacuum:    Breech:    Observed anomalies      Living?:     Apgars    Living status: Living  Apgar Component Scores:  1 min.:  5 min.:  10 min.:  15 min.:  20 min.:    Skin color:  1  1       Heart rate:  2  2       Reflex irritability:  2  2       Muscle tone:  2  2       Respiratory effort:  1  1       Total:  8  8       Apgars assigned by: WIN FRAIRE RN         Placenta: Delivered:       appearance  Pending Diagnostic Studies:       None            Discharged Condition: good    Disposition: Home or Self Care    Follow Up:   Follow-up Information       Brooklyn Kraft MD Follow up in 6 week(s).    Specialty: Obstetrics and Gynecology  Contact information:  [address] [place] [address]  [phone]                           Patient Instructions:      Diet Adult Regular     Pelvic Rest     Notify your health care provider if you experience any of the following:  temperature >100.4     Notify your health care provider if you experience any of the following:  persistent nausea and vomiting " or diarrhea     Notify your health care provider if you experience any of the following:  severe uncontrolled pain     Notify your health care provider if you experience any of the following:  difficulty breathing or increased cough     Notify your health care provider if you experience any of the following:  severe persistent headache     Notify your health care provider if you experience any of the following:  worsening rash     Notify your health care provider if you experience any of the following:  persistent dizziness, light-headedness, or visual disturbances     Notify your health care provider if you experience any of the following:  increased confusion or weakness     Activity as tolerated     Medications:  Current Discharge Medication List        START taking these medications    Details   ibuprofen (ADVIL,MOTRIN) 800 MG tablet Take 1 tablet (800 mg total) by mouth every 6 (six) hours as needed (cramping).  Qty: 60 tablet, Refills: 0      oxyCODONE-acetaminophen (PERCOCET) 5-325 mg per tablet Take 1 tablet by mouth every 4 (four) hours as needed for Pain.  Qty: 10 tablet, Refills: 0    Comments: n/a            CONTINUE these medications which have NOT CHANGED    Details   ferrous sulfate (FEOSOL) 325 mg (65 mg iron) Tab tablet Take 325 mg by mouth daily with breakfast.      prenatal no122/iron/folic acid (PRENATAL MULTI ORAL) Take by mouth.           STOP taking these medications       ondansetron (ZOFRAN-ODT) 8 MG TbDL Comments:   Reason for Stopping:         valACYclovir (VALTREX) 500 MG tablet Comments:   Reason for Stopping:               Brooklyn Kraft MD, MD  Obstetrics  Formerly [facility], later [facility]

## 2024-07-16 LAB
CHLAMYDIA, AMPLIFIED DNA: NEGATIVE
N GONORRHOEAE, AMPLIFIED DNA: NEGATIVE

## 2024-07-26 NOTE — PLAN OF CARE
Problem: Postpartum (Vaginal Delivery) (Adult,Obstetrics,Pediatric)  Goal: Signs and Symptoms of Listed Potential Problems Will be Absent, Minimized or Managed (Postpartum)  Signs and symptoms of listed potential problems will be absent, minimized or managed by discharge/transition of care (reference Postpartum (Vaginal Delivery) (Adult,Obstetrics,Pediatric) CPG).   Outcome: Outcome(s) achieved Date Met:  01/27/17  VSS. Patient ambulating and voiding without difficulty. Pt vaginal bleeding is scant to moderate. Pt instructed to come back to hospital if she soaks a pad in an hour or blood clot bigger then golf ball. Pt verbalized understanding. Pt given all discharge paperwork and was walking off the unit to go home.       no

## 2024-10-17 ENCOUNTER — OFFICE VISIT (OUTPATIENT)
Dept: FAMILY MEDICINE | Facility: CLINIC | Age: 29
End: 2024-10-17
Payer: MEDICAID

## 2024-10-17 VITALS
HEIGHT: 62 IN | WEIGHT: 161.81 LBS | SYSTOLIC BLOOD PRESSURE: 92 MMHG | DIASTOLIC BLOOD PRESSURE: 52 MMHG | BODY MASS INDEX: 29.78 KG/M2 | OXYGEN SATURATION: 95 % | RESPIRATION RATE: 14 BRPM | HEART RATE: 104 BPM

## 2024-10-17 DIAGNOSIS — Z13.29 THYROID DISORDER SCREEN: ICD-10-CM

## 2024-10-17 DIAGNOSIS — Z13.220 ENCOUNTER FOR LIPID SCREENING FOR CARDIOVASCULAR DISEASE: ICD-10-CM

## 2024-10-17 DIAGNOSIS — B35.4 TINEA CORPORIS: Primary | ICD-10-CM

## 2024-10-17 DIAGNOSIS — Z13.6 ENCOUNTER FOR LIPID SCREENING FOR CARDIOVASCULAR DISEASE: ICD-10-CM

## 2024-10-17 DIAGNOSIS — R09.81 SINUS CONGESTION: ICD-10-CM

## 2024-10-17 DIAGNOSIS — Z76.89 ESTABLISHING CARE WITH NEW DOCTOR, ENCOUNTER FOR: ICD-10-CM

## 2024-10-17 DIAGNOSIS — Z13.1 DIABETES MELLITUS SCREENING: ICD-10-CM

## 2024-10-17 PROCEDURE — 1159F MED LIST DOCD IN RCRD: CPT | Mod: CPTII,,, | Performed by: FAMILY MEDICINE

## 2024-10-17 PROCEDURE — 3078F DIAST BP <80 MM HG: CPT | Mod: CPTII,,, | Performed by: FAMILY MEDICINE

## 2024-10-17 PROCEDURE — 99999 PR PBB SHADOW E&M-EST. PATIENT-LVL III: CPT | Mod: PBBFAC,,, | Performed by: FAMILY MEDICINE

## 2024-10-17 PROCEDURE — 3074F SYST BP LT 130 MM HG: CPT | Mod: CPTII,,, | Performed by: FAMILY MEDICINE

## 2024-10-17 PROCEDURE — G2211 COMPLEX E/M VISIT ADD ON: HCPCS | Mod: S$PBB,,, | Performed by: FAMILY MEDICINE

## 2024-10-17 PROCEDURE — 99214 OFFICE O/P EST MOD 30 MIN: CPT | Mod: S$PBB,,, | Performed by: FAMILY MEDICINE

## 2024-10-17 PROCEDURE — 3008F BODY MASS INDEX DOCD: CPT | Mod: CPTII,,, | Performed by: FAMILY MEDICINE

## 2024-10-17 PROCEDURE — 99213 OFFICE O/P EST LOW 20 MIN: CPT | Mod: PBBFAC | Performed by: FAMILY MEDICINE

## 2024-10-17 RX ORDER — CETIRIZINE HYDROCHLORIDE 10 MG/1
10 TABLET ORAL DAILY
Qty: 90 TABLET | Refills: 3 | Status: SHIPPED | OUTPATIENT
Start: 2024-10-17 | End: 2025-10-17

## 2024-10-17 RX ORDER — CLOTRIMAZOLE AND BETAMETHASONE DIPROPIONATE 10; .64 MG/G; MG/G
CREAM TOPICAL 2 TIMES DAILY PRN
Qty: 45 G | Refills: 1 | Status: SHIPPED | OUTPATIENT
Start: 2024-10-17

## 2024-10-17 RX ORDER — FLUTICASONE PROPIONATE 50 MCG
1 SPRAY, SUSPENSION (ML) NASAL DAILY
Qty: 48 G | Refills: 3 | Status: SHIPPED | OUTPATIENT
Start: 2024-10-17

## 2024-10-17 RX ORDER — FLUCONAZOLE 150 MG/1
150 TABLET ORAL
Qty: 5 TABLET | Refills: 1 | Status: SHIPPED | OUTPATIENT
Start: 2024-10-17

## 2024-10-17 NOTE — PROGRESS NOTES
Subjective:       Patient ID: Tosin Mckay is a 29 y.o. female.    Chief Complaint: Establish Care      Past Medical History:   Diagnosis Date    Anemia 2014    Herpes simplex virus (HSV) infection 2022    Rubella non-immune status, antepartum        Past Surgical History:   Procedure Laterality Date    DILATION AND CURETTAGE OF UTERUS USING SUCTION N/A 8/4/2021    Procedure: DILATION AND CURETTAGE, UTERUS, USING SUCTION;  Surgeon: Gilbert Rodriguez MD;  Location: [facility] OR;  Service: OB/GYN;  Laterality: N/A;    OPEN REDUCTION AND INTERNAL FIXATION (ORIF) OF FRACTURE OF CARPAL BONE Right 9/28/2023    Procedure: ORIF Scaphoid Right Wrist;  Surgeon: Oscar Bhandari DO;  Location: [facility] OR;  Service: Orthopedics;  Laterality: Right;    staph      staph infection abdomen        Social History     Socioeconomic History    Marital status: Single   Tobacco Use    Smoking status: Some Days     Types: Vaping w/o nicotine     Passive exposure: Past    Smokeless tobacco: Never   Substance and Sexual Activity    Alcohol use: Not Currently    Drug use: Yes     Frequency: 2.0 times per week     Comment: THC gummies    Sexual activity: Yes     Partners: Male     Birth control/protection: None     Social Drivers of Health     Financial Resource Strain: Low Risk  (7/13/2024)    Overall Financial Resource Strain (CARDIA)     Difficulty of Paying Living Expenses: Not hard at all   Food Insecurity: No Food Insecurity (7/13/2024)    Hunger Vital Sign     Worried About Running Out of Food in the Last Year: Never true     Ran Out of Food in the Last Year: Never true   Transportation Needs: No Transportation Needs (7/13/2024)    TRANSPORTATION NEEDS     Transportation : No   Stress: No Stress Concern Present (7/11/2024)    [employer] - Occupational Stress Questionnaire     Feeling of Stress : Not at all   Housing Stability: Low Risk  (7/13/2024)    Housing Stability Vital Sign     Unable to Pay for  Housing in the Last Year: No     Homeless in the Last Year: No       Family History   Problem Relation Name Age of Onset    Hypertension Father      Cancer Mother      Cervical cancer Mother      Breast cancer Neg Hx      Colon cancer Neg Hx      Diabetes Neg Hx      Eclampsia Neg Hx      Stroke Neg Hx      Miscarriages / Stillbirths Neg Hx      Ovarian cancer Neg Hx         Review of patient's allergies indicates:  No Known Allergies       Current Outpatient Medications:     ondansetron (ZOFRAN-ODT) 4 MG TbDL, Take 1 tablet (4 mg total) by mouth every 12 (twelve) hours as needed (nausea)., Disp: 30 tablet, Rfl: 1    valACYclovir (VALTREX) 500 MG tablet, Take 1 tablet (500 mg total) by mouth once daily., Disp: 30 tablet, Rfl: 2    cetirizine (ZYRTEC) 10 MG tablet, Take 1 tablet (10 mg total) by mouth once daily., Disp: 90 tablet, Rfl: 3    clotrimazole-betamethasone 1-0.05% (LOTRISONE) cream, Apply topically 2 (two) times daily as needed (tinea)., Disp: 45 g, Rfl: 1    fluconazole (DIFLUCAN) 150 MG Tab, Take 1 tablet (150 mg total) by mouth every 72 hours., Disp: 5 tablet, Rfl: 1    fluticasone propionate (FLONASE) 50 mcg/actuation nasal spray, 1 spray (50 mcg total) by Each Nostril route once daily., Disp: 48 g, Rfl: 3    Ms Mckay is a 28 yo female here to establish care. She is a CF carrier and complains of sinusitis. She also has a rash at the lower abdominal wall for about 1 week. It is pruruitic and hse has tried steroid cream but it is not helping      Review of Systems   Constitutional:  Negative for activity change, appetite change, chills, diaphoresis and fever.   HENT:  Negative for congestion, ear pain, postnasal drip, rhinorrhea and sore throat.    Eyes:  Negative for pain, discharge, redness and itching.   Respiratory:  Negative for cough and shortness of breath.    Cardiovascular:  Negative for chest pain, palpitations and leg swelling.   Gastrointestinal:  Negative for abdominal distention, abdominal  pain, constipation, diarrhea and nausea.   Genitourinary:  Negative for difficulty urinating, dysuria, frequency and urgency.   Skin:  Positive for rash. Negative for color change and wound.        Lower anterior abdominal wall. Consistent with tinea   All other systems reviewed and are negative.      Objective:      Physical Exam  Vitals and nursing note reviewed.   Constitutional:       Appearance: Normal appearance. She is normal weight.   HENT:      Head: Normocephalic.      Nose: Nose normal.   Eyes:      Extraocular Movements: Extraocular movements intact.      Conjunctiva/sclera: Conjunctivae normal.      Pupils: Pupils are equal, round, and reactive to light.   Cardiovascular:      Rate and Rhythm: Normal rate and regular rhythm.      Heart sounds: Normal heart sounds. No murmur heard.  Pulmonary:      Effort: Pulmonary effort is normal. No respiratory distress.      Breath sounds: Normal breath sounds.   Musculoskeletal:         General: Normal range of motion.      Cervical back: Normal range of motion and neck supple.   Skin:     General: Skin is warm and dry.   Neurological:      General: No focal deficit present.      Mental Status: She is alert and oriented to person, place, and time.   Psychiatric:         Mood and Affect: Mood normal.         Behavior: Behavior normal.         Assessment:       1. Tinea corporis    2. Establishing care with new doctor, encounter for    3. Diabetes mellitus screening    4. Thyroid disorder screen    5. Encounter for lipid screening for cardiovascular disease    6. Sinus congestion        Plan:         Tinea corporis  -     fluconazole (DIFLUCAN) 150 MG Tab; Take 1 tablet (150 mg total) by mouth every 72 hours.  Dispense: 5 tablet; Refill: 1  -     clotrimazole-betamethasone 1-0.05% (LOTRISONE) cream; Apply topically 2 (two) times daily as needed (tinea).  Dispense: 45 g; Refill: 1    Establishing care with new doctor, encounter for  -     Microalbumin/Creatinine  Ratio, Urine; Future; Expected date: 10/17/2024  -     Vitamin D; Future; Expected date: 10/17/2024  -     Vitamin B12; Future; Expected date: 10/17/2024  -     Lipid Panel; Future; Expected date: 10/17/2024  -     CBC Auto Differential; Future; Expected date: 10/17/2024  -     Comprehensive Metabolic Panel; Future; Expected date: 10/17/2024  -     Hemoglobin A1C; Future; Expected date: 10/17/2024  -     TSH; Future; Expected date: 10/17/2024    Diabetes mellitus screening  -     Hemoglobin A1C; Future; Expected date: 10/17/2024    Thyroid disorder screen  -     TSH; Future; Expected date: 10/17/2024    Encounter for lipid screening for cardiovascular disease  -     Vitamin D; Future; Expected date: 10/17/2024    Sinus congestion  -     cetirizine (ZYRTEC) 10 MG tablet; Take 1 tablet (10 mg total) by mouth once daily.  Dispense: 90 tablet; Refill: 3  -     X-Ray Sinuses Min 3 Views; Future; Expected date: 10/17/2024  -     fluticasone propionate (FLONASE) 50 mcg/actuation nasal spray; 1 spray (50 mcg total) by Each Nostril route once daily.  Dispense: 48 g; Refill: 3        Risks, benefits, and side effects were discussed with the patient. All questions were answered to the fullest satisfaction of the patient, and pt verbalized understanding and agreement to treatment plan. Pt was to call with any new or worsening symptoms, or present to the ER.        Lizzette Germain MD

## 2024-10-18 ENCOUNTER — HOSPITAL ENCOUNTER (OUTPATIENT)
Dept: RADIOLOGY | Facility: HOSPITAL | Age: 29
Discharge: HOME | End: 2024-10-18
Attending: FAMILY MEDICINE
Payer: MEDICAID

## 2024-10-18 DIAGNOSIS — R09.81 SINUS CONGESTION: ICD-10-CM

## 2024-10-18 PROCEDURE — 70220 X-RAY EXAM OF SINUSES: CPT | Mod: 26,,, | Performed by: RADIOLOGY

## 2024-10-18 PROCEDURE — 70220 X-RAY EXAM OF SINUSES: CPT | Mod: TC

## 2024-10-21 ENCOUNTER — PATIENT MESSAGE (OUTPATIENT)
Dept: FAMILY MEDICINE | Facility: CLINIC | Age: 29
End: 2024-10-21
Payer: MEDICAID

## 2024-10-21 DIAGNOSIS — E55.9 VITAMIN D DEFICIENCY: Primary | ICD-10-CM

## 2024-10-21 RX ORDER — ERGOCALCIFEROL 1.25 MG/1
50000 CAPSULE ORAL
Qty: 13 CAPSULE | Refills: 3 | Status: SHIPPED | OUTPATIENT
Start: 2024-10-21

## 2024-10-30 ENCOUNTER — HOSPITAL ENCOUNTER (OUTPATIENT)
Dept: RADIOLOGY | Facility: HOSPITAL | Age: 29
Discharge: HOME | End: 2024-10-30
Attending: FAMILY MEDICINE
Payer: MEDICAID

## 2024-10-30 DIAGNOSIS — R79.89 ELEVATED SERUM FREE T4 LEVEL: ICD-10-CM

## 2024-10-30 DIAGNOSIS — R79.89 DECREASED THYROID STIMULATING HORMONE (TSH) LEVEL: ICD-10-CM

## 2024-10-30 PROCEDURE — 76536 US EXAM OF HEAD AND NECK: CPT | Mod: 26,,, | Performed by: RADIOLOGY

## 2024-10-30 PROCEDURE — 76536 US EXAM OF HEAD AND NECK: CPT | Mod: TC

## 2024-10-31 ENCOUNTER — E-CONSULT (OUTPATIENT)
Dept: ENDOCRINOLOGY | Facility: CLINIC | Age: 29
End: 2024-10-31
Payer: MEDICAID

## 2024-10-31 DIAGNOSIS — E05.90 HYPERTHYROIDISM: Primary | ICD-10-CM

## 2024-11-01 ENCOUNTER — LAB VISIT (OUTPATIENT)
Dept: LAB | Facility: HOSPITAL | Age: 29
End: 2024-11-01
Attending: FAMILY MEDICINE
Payer: MEDICAID

## 2024-11-01 DIAGNOSIS — R79.89 ELEVATED SERUM FREE T4 LEVEL: ICD-10-CM

## 2024-11-01 DIAGNOSIS — R00.0 TACHYCARDIA: Primary | ICD-10-CM

## 2024-11-01 DIAGNOSIS — R79.89 DECREASED THYROID STIMULATING HORMONE (TSH) LEVEL: ICD-10-CM

## 2024-11-01 DIAGNOSIS — R79.89 ELEVATED SERUM FREE T4 LEVEL: Primary | ICD-10-CM

## 2024-11-01 PROCEDURE — 36415 COLL VENOUS BLD VENIPUNCTURE: CPT | Performed by: FAMILY MEDICINE

## 2024-11-01 PROCEDURE — 83520 IMMUNOASSAY QUANT NOS NONAB: CPT | Performed by: FAMILY MEDICINE

## 2024-11-01 RX ORDER — ATENOLOL 25 MG/1
25 TABLET ORAL DAILY
Qty: 90 TABLET | Refills: 3 | Status: SHIPPED | OUTPATIENT
Start: 2024-11-01 | End: 2025-11-01

## 2024-11-04 LAB — TSH RECEP AB SER-ACNC: 1.2 IU/L (ref 0–1.75)

## 2024-11-18 ENCOUNTER — HOSPITAL ENCOUNTER (OUTPATIENT)
Dept: RADIOLOGY | Facility: HOSPITAL | Age: 29
Discharge: HOME | End: 2024-11-18
Attending: FAMILY MEDICINE
Payer: MEDICAID

## 2024-11-18 DIAGNOSIS — E05.90 HYPERTHYROIDISM: ICD-10-CM

## 2024-11-18 PROCEDURE — 78014 THYROID IMAGING W/BLOOD FLOW: CPT | Mod: 26,,, | Performed by: RADIOLOGY

## 2024-11-18 PROCEDURE — A9516 IODINE I-123 SOD IODIDE MIC: HCPCS | Performed by: FAMILY MEDICINE

## 2024-11-18 PROCEDURE — 78014 THYROID IMAGING W/BLOOD FLOW: CPT | Mod: TC

## 2024-11-18 RX ORDER — SODIUM IODIDE I 123 100 UCI/1
100 CAPSULE, GELATIN COATED ORAL
Status: COMPLETED | OUTPATIENT
Start: 2024-11-18 | End: 2024-11-18

## 2024-11-18 RX ADMIN — SODIUM IODIDE I 123 100 MICROCI: 100 CAPSULE, GELATIN COATED ORAL at 11:11

## 2024-11-18 RX ADMIN — SODIUM IODIDE I 123 98 MICROCI: 100 CAPSULE, GELATIN COATED ORAL at 11:11

## 2024-11-19 ENCOUNTER — HOSPITAL ENCOUNTER (OUTPATIENT)
Dept: RADIOLOGY | Facility: HOSPITAL | Age: 29
Discharge: HOME | End: 2024-11-19
Attending: FAMILY MEDICINE
Payer: MEDICAID

## 2024-11-19 DIAGNOSIS — R00.0 TACHYCARDIA: ICD-10-CM

## 2024-11-19 DIAGNOSIS — R79.89 ELEVATED TSH: Primary | ICD-10-CM

## 2024-11-19 DIAGNOSIS — R79.89 ELEVATED SERUM FREE T4 LEVEL: ICD-10-CM

## 2024-11-26 ENCOUNTER — LAB VISIT (OUTPATIENT)
Dept: LAB | Facility: HOSPITAL | Age: 29
End: 2024-11-26
Attending: FAMILY MEDICINE
Payer: MEDICAID

## 2024-11-26 DIAGNOSIS — R00.0 TACHYCARDIA: ICD-10-CM

## 2024-11-26 DIAGNOSIS — R79.89 ELEVATED SERUM FREE T4 LEVEL: ICD-10-CM

## 2024-11-26 LAB — CRP SERPL-MCNC: 0.7 MG/L (ref 0–8.2)

## 2024-11-26 PROCEDURE — 36415 COLL VENOUS BLD VENIPUNCTURE: CPT | Performed by: FAMILY MEDICINE

## 2024-11-26 PROCEDURE — 86140 C-REACTIVE PROTEIN: CPT | Performed by: FAMILY MEDICINE

## 2024-12-04 ENCOUNTER — PATIENT MESSAGE (OUTPATIENT)
Dept: FAMILY MEDICINE | Facility: CLINIC | Age: 29
End: 2024-12-04
Payer: MEDICAID

## 2024-12-20 ENCOUNTER — LAB VISIT (OUTPATIENT)
Dept: LAB | Facility: HOSPITAL | Age: 29
End: 2024-12-20
Attending: FAMILY MEDICINE
Payer: MEDICAID

## 2024-12-20 DIAGNOSIS — E06.3 HYPOTHYROIDISM DUE TO HASHIMOTO THYROIDITIS: Primary | ICD-10-CM

## 2024-12-20 DIAGNOSIS — R79.89 ELEVATED TSH: ICD-10-CM

## 2024-12-20 DIAGNOSIS — R00.0 TACHYCARDIA: ICD-10-CM

## 2024-12-20 DIAGNOSIS — R79.89 ELEVATED SERUM FREE T4 LEVEL: ICD-10-CM

## 2024-12-20 LAB
T4 FREE SERPL-MCNC: 0.65 NG/DL (ref 0.71–1.51)
TSH SERPL DL<=0.005 MIU/L-ACNC: 50.72 UIU/ML (ref 0.4–4)

## 2024-12-20 PROCEDURE — 36415 COLL VENOUS BLD VENIPUNCTURE: CPT | Performed by: FAMILY MEDICINE

## 2024-12-20 PROCEDURE — 84443 ASSAY THYROID STIM HORMONE: CPT | Performed by: FAMILY MEDICINE

## 2024-12-20 PROCEDURE — 84439 ASSAY OF FREE THYROXINE: CPT | Performed by: FAMILY MEDICINE

## 2024-12-20 RX ORDER — LEVOTHYROXINE SODIUM 25 UG/1
25 TABLET ORAL
Qty: 90 TABLET | Refills: 3 | Status: SHIPPED | OUTPATIENT
Start: 2024-12-20 | End: 2025-12-20

## 2024-12-27 ENCOUNTER — TELEPHONE (OUTPATIENT)
Dept: ENDOCRINOLOGY | Facility: CLINIC | Age: 29
End: 2024-12-27
Payer: MEDICAID

## 2025-01-03 ENCOUNTER — OFFICE VISIT (OUTPATIENT)
Facility: CLINIC | Age: 30
End: 2025-01-03
Payer: MEDICAID

## 2025-01-03 DIAGNOSIS — E03.9 HYPOTHYROIDISM, UNSPECIFIED TYPE: ICD-10-CM

## 2025-01-03 DIAGNOSIS — R00.0 TACHYCARDIA: Primary | ICD-10-CM

## 2025-01-03 RX ORDER — LEVOTHYROXINE SODIUM 75 UG/1
75 TABLET ORAL
Qty: 30 TABLET | Refills: 11 | Status: SHIPPED | OUTPATIENT
Start: 2025-01-03 | End: 2026-01-03

## 2025-01-03 NOTE — ASSESSMENT & PLAN NOTE
Prior issues with tachycardia which were most likely related to her initial hyperthyroid state but now improved.  She has beta blocker on hold now.  I will remove this from her regimen for now.  Focus now on treating hypothyroidism.

## 2025-01-03 NOTE — Clinical Note
Please arrange labs for this patient in about 4-5 weeks (early February).  Follow up in 1 year for now  Thanks

## 2025-01-03 NOTE — PATIENT INSTRUCTIONS
Thank you for meeting with me today to discuss your thyroid health. Below is a summary of your condition, our treatment plan, and important information regarding future monitoring and care.    Your Diagnosis  You likely developed postpartum thyroiditis after your pregnancy in July. Postpartum thyroiditis is an inflammation of the thyroid gland that can occur within a few months after giving birth. It often follows different phases:  Hyperthyroid Phase (October): Your thyroid was temporarily overactive, as shown by your low TSH and elevated free T4. Symptoms may have included anxiety and rapid heart rate.  Current Hypothyroid Phase: Now your thyroid function has shifted, with a very elevated TSH and low free T4, indicating hypothyroidism. This may be due to damage from the earlier thyroiditis or an underlying condition like Hashimotos thyroiditis, an autoimmune thyroid disorder. We will check for Hashimoto's on your next set of labs.    Your thyroid ultrasound showed a heterogeneous appearance, and your nuclear medicine uptake scan showed increased uptake. However, your thyrotropin receptor antibody test was normal, confirming you do not have Graves' disease (a common cause of autoimmune hyperthyroidism).    Treatment Plan  To manage your current hypothyroidism, I have increased your thyroid hormone medication (levothyroxine) dose from 25 mcg to 75 mcg. This dose is lower than your full weight-based dose because your thyroid may partially recover over time. Adjusting the dose gradually will help avoid overtreatment.  Symptoms You May Notice: Improvements in your energy levels, mood, and stabilization of recent weight gain.    What Is Hashimotos Thyroiditis?  Hashimoto's thyroiditis is an autoimmune condition where the bodys immune system attacks the thyroid gland. It is a common cause of hypothyroidism. Symptoms can include fatigue, weight gain, dry skin, hair thinning, and feeling cold. We will check your  thyroid antibodies next month to confirm if Hashimotos is the underlying cause of your condition.    Important Information for Monitoring and Future Planning  Medication Instructions:  Continue taking your thyroid medication first thing in the morning on an empty stomach with water.  Avoid calcium or iron supplements within 4 hours of your thyroid medication (take these in the afternoon or evening).  Pregnancy or Estrogen-Containing Birth Control:  If you become pregnant again or start estrogen-containing birth control, your thyroid hormone dose will need closer monitoring and possible adjustment.  Follow-Up Plan:  I will check your thyroid labs again in February. Please reach out with any questions or concerns before then.    When to Contact Me  If you develop new or worsening symptoms such as severe fatigue, weight changes, mood shifts, or heart palpitations.  For any questions about your medications or lab tests.

## 2025-01-03 NOTE — PROGRESS NOTES
ENDOCRINOLOGY NEW PATIENT VISIT: 01/03/2025    The patient location is: Work  The chief complaint leading to consultation is: Hypothyroidism    Visit type: audiovisual    Face to Face time with patient: 21 minutes  45 minutes of total time spent on the encounter, which includes face to face time and non-face to face time preparing to see the patient (eg, review of tests), Obtaining and/or reviewing separately obtained history, Documenting clinical information in the electronic or other health record, Independently interpreting results (not separately reported) and communicating results to the patient/family/caregiver, or Care coordination (not separately reported).     Each patient to whom he or she provides medical services by telemedicine is:  (1) informed of the relationship between the physician and patient and the respective role of any other health care provider with respect to management of the patient; and (2) notified that he or she may decline to receive medical services by telemedicine and may withdraw from such care at any time.      Subjective:      Patient ID: Tosin Mckay is a 29 y.o. female.    Chief Complaint:  Hypothyroidism    History of Present Illness  Carmita Mckay presents for evaluation of abnormal thyroid testing.  She had an endocrine E-consult performed in October after labs showed a pattern of hyperthyroidism.  Further testing and follow up recommended.  More recent labs showed a transition to hypothyroidism with start of low dose thyroid replacement recently.  Now seeing endocrine for initial evaluation.         Regarding Hypothyroidism:    Pregnancy with delivery in July 2024.  Following this she had symptoms of increase hot flashes and tachycardia develop in early October  In October labs as below showed hyperthyroid with start of beta blocker temporarily but then she transition to more hypothyroid symptoms with weight gain  Started on therapy with LT4 by PCP based on labs from Dec  2024 (some mild symptoms improvement with fatigue)    - Duration of hypothyroidism: Dec 2024  - Etiology: Possibly hashimoto's, otherwise suspect slow recovery from post-partum thyroiditis as a possibility as well  - Contributing Factors None  - Current relevant medications: levothyroxine T4 (Synthroid) 25 mcg daily (started late Dec 2024)  - Weight based dosing ([weight in kg] x 1.6):  124 mcg  - Takes thyroid medications properly  - Plans for pregnancy at this time: None at the moment     Latest Reference Range & Units 08/11/19 19:17 12/20/23 23:00 10/18/24 08:51 11/01/24 14:32 12/20/24 08:21   TSH 0.400 - 4.000 uIU/mL 2.439 0.490 <0.010 (L)  50.724 (H)   Free T4 0.71 - 1.51 ng/dL   2.02 (H)  0.65 (L)   Thyrotropin Receptor Ab 0.00 - 1.75 IU/L    1.20    (L): Data is abnormally low  (H): Data is abnormally high      - Most recent thyroid imaging:     Thyroid Uptake Scan:  11/19/2024    FINDINGS:  Radioiodine uptake is outside the normal range at 06:00 hours measuring 18.1%, with normal range 5-15%.  The 24 hour uptake is within the normal range at 21%, normal 12-25%.     Images of the thyroid gland demonstrate homogeneous uptake of radiotracer without focal hot or cold nodules.  The size of the thyroid gland appears qualitatively mildly enlarged, although a normal volume was obtained on ultrasound.     Impression:  Elevated radioiodine uptake at 6 hours, with normal activity at 24 hours.  Diffusely heterogeneous and hypervascular gland on ultrasound.  This combination of findings could be seen with Graves disease.      Thyroid US:  10/30/2024    FINDINGS:  Thyroid lobes are normal in size with a mild heterogeneous pattern of echogenicity measuring 2.3 x 1.1 x 1.4 cm on the right and 4.2 x 1.4 x 1.4 cm on left.  This measures to a volume of 6.0 cm.  The isthmus measures 0.24 cm.  Thyroid lobes demonstrate increased blood flow by color Doppler.     No cystic or solid thyroid nodules identified.  Multiple small  "benign appearing cervical lymph nodes are identified.     Impression:  Heterogeneous hyperemic thyroid gland without cystic or solid nodules.      - Most recent DEXA:  None      - Current symptoms:   No   Yes  []    [x]   Weight gain (20 lb gain since August)  []    [x]   Fatigue   [x]    []   Constipation  []    [x]   Hair loss (also recent pregnancy)  []    [x]   Brittle nails  []    [x]   Mental fog  [x]    []   Cold intolerance  []    [x]   Memory impair  [x]    []   Muscle weakness  [x]    []   Neck swelling, pain, pressure  [x]    []   Hoarseness  [x]    []   Periorbital edema  [x]    []   Lithium or Amiodarone use  [x]    []   Chemotherapy  [x]    []   Prior neck radiation  [x]    []   Recent severe illness  []    [x]   Recent pregnancy (delivered in July 2024)  [x]    []   Infertility/abnormal menstruation    - Personal or Family History:  No   Yes  [x]    []   Thyroid disorder  [x]    []   Thyroid cancer      ROS:   As above    Objective:     There were no vitals taken for this visit.  BP Readings from Last 3 Encounters:   10/17/24 (!) 92/52   08/15/24 120/70   07/15/24 106/69     Wt Readings from Last 1 Encounters:   10/17/24 0847 73.4 kg (161 lb 12.8 oz)     There is no height or weight on file to calculate BMI.    Ht: 5'2"  Wt: 174  BMI: 31.8    Physical Exam  Constitutional:       Appearance: Normal appearance.   Neurological:      Mental Status: She is alert.   Psychiatric:         Mood and Affect: Mood normal.         Behavior: Behavior normal.        Lab Review:   Lab Results   Component Value Date    HGBA1C 4.7 10/18/2024     Lab Results   Component Value Date    CHOL 140 10/18/2024    HDL 54 10/18/2024    LDLCALC 72.8 10/18/2024    TRIG 66 10/18/2024    CHOLHDL 38.6 10/18/2024     Lab Results   Component Value Date     10/18/2024    K 4.1 10/18/2024     10/18/2024    CO2 20 (L) 10/18/2024    GLU 92 10/18/2024    BUN 10 10/18/2024    CREATININE 0.6 10/18/2024    CALCIUM 9.3 10/18/2024    " PROT 6.9 10/18/2024    ALBUMIN 3.6 10/18/2024    BILITOT 0.4 10/18/2024    ALKPHOS 70 10/18/2024    AST 24 10/18/2024    ALT 22 10/18/2024    ANIONGAP 9 10/18/2024    ESTGFRAFRICA >60.0 05/18/2022    EGFRNONAA >60.0 05/18/2022    TSH 50.724 (H) 12/20/2024     Vit D, 25-Hydroxy   Date Value Ref Range Status   10/18/2024 18 (L) 30 - 96 ng/mL Final     Comment:     Vitamin D deficiency.........<10 ng/mL                              Vitamin D insufficiency......10-29 ng/mL       Vitamin D sufficiency........> or equal to 30 ng/mL  Vitamin D toxicity............>100 ng/mL         Assessment and Plan     Hypothyroidism  Hypothyroidism based on labs with TSH elevated and free t4 mildly low.  Prior labs from October with thyroiditis picture, possibly related to recent pregnancy.  No antibody testing for hashimoto's completed by [facility] antibody was normal.  Now clinically hypothyroid.  Was on beta blocker for heart rate issues in October/November, now on hold.      Concern is that this could be hashimoto's or other autoimmune hypothyroidism or possibly thyroid was damaged from period of post-partum thyroiditis with difficulty recovering to normal.  Recent start of low dose 25 mcg LT4 with some fatigue improvement but overall not at baseline.      Full weight based thyroid hormone dosing would be 124 mcg, now on 25 mcg.      Plan:  - Increase LT4 dose to 75 mcg (not quite full weight dose given possibility for endogenous hormone recovery)  - Repeat TSH with free t4 in about 5 weeks (will also check TPO Ab)    Reviewed need to notify us if she becomes pregnant again or if she starts estrogen containing birth control as sooner labs and dose changes may be needed.      Tachycardia  Prior issues with tachycardia which were most likely related to her initial hyperthyroid state but now improved.  She has beta blocker on hold now.  I will remove this from her regimen for now.  Focus now on treating hypothyroidism.        RTC  in 1 year.  Labs in about 4-5 weeks.        **Visit today included increased complexity associated with the care of the problems addressed and managing the longitudinal care of the patient due to the serious and/or complex managed problems      Austin Sanchez DO     Disclaimer: This note has been generated using voice-recognition software. There may be typographical errors that have been missed during proof-reading.

## 2025-01-03 NOTE — ASSESSMENT & PLAN NOTE
Hypothyroidism based on labs with TSH elevated and free t4 mildly low.  Prior labs from October with thyroiditis picture, possibly related to recent pregnancy.  No antibody testing for hashimoto's completed by [facility] antibody was normal.  Now clinically hypothyroid.  Was on beta blocker for heart rate issues in October/November, now on hold.      Concern is that this could be hashimoto's or other autoimmune hypothyroidism or possibly thyroid was damaged from period of post-partum thyroiditis with difficulty recovering to normal.  Recent start of low dose 25 mcg LT4 with some fatigue improvement but overall not at baseline.      Full weight based thyroid hormone dosing would be 124 mcg, now on 25 mcg.      Plan:  - Increase LT4 dose to 75 mcg (not quite full weight dose given possibility for endogenous hormone recovery)  - Repeat TSH with free t4 in about 5 weeks (will also check TPO Ab)    Reviewed need to notify us if she becomes pregnant again or if she starts estrogen containing birth control as sooner labs and dose changes may be needed.

## 2025-02-11 ENCOUNTER — LAB VISIT (OUTPATIENT)
Dept: LAB | Facility: HOSPITAL | Age: 30
End: 2025-02-11
Attending: STUDENT IN AN ORGANIZED HEALTH CARE EDUCATION/TRAINING PROGRAM
Payer: MEDICAID

## 2025-02-11 DIAGNOSIS — E03.9 HYPOTHYROIDISM, UNSPECIFIED TYPE: ICD-10-CM

## 2025-02-11 LAB
T4 FREE SERPL-MCNC: 0.9 NG/DL (ref 0.71–1.51)
THYROPEROXIDASE IGG SERPL-ACNC: 1461.8 IU/ML
TSH SERPL DL<=0.005 MIU/L-ACNC: 3.29 UIU/ML (ref 0.4–4)

## 2025-02-11 PROCEDURE — 84439 ASSAY OF FREE THYROXINE: CPT | Performed by: STUDENT IN AN ORGANIZED HEALTH CARE EDUCATION/TRAINING PROGRAM

## 2025-02-11 PROCEDURE — 36415 COLL VENOUS BLD VENIPUNCTURE: CPT | Performed by: STUDENT IN AN ORGANIZED HEALTH CARE EDUCATION/TRAINING PROGRAM

## 2025-02-11 PROCEDURE — 86376 MICROSOMAL ANTIBODY EACH: CPT | Performed by: STUDENT IN AN ORGANIZED HEALTH CARE EDUCATION/TRAINING PROGRAM

## 2025-02-11 PROCEDURE — 84443 ASSAY THYROID STIM HORMONE: CPT | Performed by: STUDENT IN AN ORGANIZED HEALTH CARE EDUCATION/TRAINING PROGRAM

## 2025-02-12 ENCOUNTER — PATIENT MESSAGE (OUTPATIENT)
Facility: CLINIC | Age: 30
End: 2025-02-12
Payer: MEDICAID

## 2025-02-13 ENCOUNTER — TELEPHONE (OUTPATIENT)
Dept: ENDOCRINOLOGY | Facility: CLINIC | Age: 30
End: 2025-02-13
Payer: MEDICAID

## 2025-02-13 DIAGNOSIS — E06.3 HYPOTHYROIDISM DUE TO HASHIMOTO THYROIDITIS: Primary | ICD-10-CM

## 2025-02-13 RX ORDER — LEVOTHYROXINE SODIUM 88 UG/1
88 TABLET ORAL DAILY
Qty: 90 TABLET | Refills: 3 | Status: SHIPPED | OUTPATIENT
Start: 2025-02-13

## 2025-02-13 NOTE — TELEPHONE ENCOUNTER
Dose change planned from 75 mcg to 88 mcg dose of LT4 given tSH is on high normal end.  Known TPO Ab for Hashimoto's diagnosis now.

## 2025-04-29 ENCOUNTER — LAB VISIT (OUTPATIENT)
Dept: LAB | Facility: HOSPITAL | Age: 30
End: 2025-04-29
Attending: FAMILY MEDICINE
Payer: COMMERCIAL

## 2025-04-29 DIAGNOSIS — E55.9 VITAMIN D DEFICIENCY: ICD-10-CM

## 2025-04-29 DIAGNOSIS — E06.3 HYPOTHYROIDISM DUE TO HASHIMOTO THYROIDITIS: ICD-10-CM

## 2025-04-29 LAB
25(OH)D3+25(OH)D2 SERPL-MCNC: 24 NG/ML (ref 30–96)
T4 FREE SERPL-MCNC: 1.14 NG/DL (ref 0.71–1.51)
TSH SERPL-ACNC: 2.4 UIU/ML (ref 0.4–4)

## 2025-04-29 PROCEDURE — 84443 ASSAY THYROID STIM HORMONE: CPT

## 2025-04-29 PROCEDURE — 36415 COLL VENOUS BLD VENIPUNCTURE: CPT

## 2025-04-29 PROCEDURE — 84439 ASSAY OF FREE THYROXINE: CPT

## 2025-04-29 PROCEDURE — 82306 VITAMIN D 25 HYDROXY: CPT

## 2025-04-30 ENCOUNTER — RESULTS FOLLOW-UP (OUTPATIENT)
Dept: FAMILY MEDICINE | Facility: CLINIC | Age: 30
End: 2025-04-30
Payer: COMMERCIAL

## 2025-05-21 ENCOUNTER — OFFICE VISIT (OUTPATIENT)
Dept: FAMILY MEDICINE | Facility: CLINIC | Age: 30
End: 2025-05-21
Payer: COMMERCIAL

## 2025-05-21 VITALS
DIASTOLIC BLOOD PRESSURE: 74 MMHG | WEIGHT: 158.63 LBS | SYSTOLIC BLOOD PRESSURE: 122 MMHG | OXYGEN SATURATION: 99 % | BODY MASS INDEX: 29.19 KG/M2 | HEIGHT: 62 IN | RESPIRATION RATE: 14 BRPM | HEART RATE: 73 BPM

## 2025-05-21 DIAGNOSIS — B35.4 TINEA CORPORIS: ICD-10-CM

## 2025-05-21 DIAGNOSIS — R51.9 CHRONIC LEFT-SIDED HEADACHES: Primary | ICD-10-CM

## 2025-05-21 DIAGNOSIS — G89.29 CHRONIC LEFT-SIDED HEADACHES: Primary | ICD-10-CM

## 2025-05-21 PROCEDURE — 3074F SYST BP LT 130 MM HG: CPT | Mod: CPTII,S$GLB,, | Performed by: FAMILY MEDICINE

## 2025-05-21 PROCEDURE — 3008F BODY MASS INDEX DOCD: CPT | Mod: CPTII,S$GLB,, | Performed by: FAMILY MEDICINE

## 2025-05-21 PROCEDURE — 99214 OFFICE O/P EST MOD 30 MIN: CPT | Mod: S$GLB,,, | Performed by: FAMILY MEDICINE

## 2025-05-21 PROCEDURE — 1159F MED LIST DOCD IN RCRD: CPT | Mod: CPTII,S$GLB,, | Performed by: FAMILY MEDICINE

## 2025-05-21 PROCEDURE — G2211 COMPLEX E/M VISIT ADD ON: HCPCS | Mod: S$GLB,,, | Performed by: FAMILY MEDICINE

## 2025-05-21 PROCEDURE — 99999 PR PBB SHADOW E&M-EST. PATIENT-LVL V: CPT | Mod: PBBFAC,,, | Performed by: FAMILY MEDICINE

## 2025-05-21 PROCEDURE — 3078F DIAST BP <80 MM HG: CPT | Mod: CPTII,S$GLB,, | Performed by: FAMILY MEDICINE

## 2025-05-21 RX ORDER — DESOGESTREL AND ETHINYL ESTRADIOL AND ETHINYL ESTRADIOL 21-5 (28)
1 KIT ORAL DAILY
COMMUNITY
Start: 2025-04-15

## 2025-05-21 RX ORDER — TOPIRAMATE 25 MG/1
25 TABLET, FILM COATED ORAL 2 TIMES DAILY
Qty: 60 TABLET | Refills: 3 | Status: SHIPPED | OUTPATIENT
Start: 2025-05-21 | End: 2026-05-21

## 2025-05-21 RX ORDER — FLUCONAZOLE 150 MG/1
150 TABLET ORAL
Qty: 7 TABLET | Refills: 1 | Status: SHIPPED | OUTPATIENT
Start: 2025-05-21

## 2025-05-21 RX ORDER — ATENOLOL 25 MG/1
25 TABLET ORAL DAILY
COMMUNITY
Start: 2025-02-03

## 2025-05-21 NOTE — PROGRESS NOTES
Patient ID: Tosin Mckay is a 29 y.o. female.    Chief Complaint: Headache (Migraines behind left eye) and Rash (Lower abdominal region. Has tried several different creams and powders with little effect. Comes and goes)    History of Present Illness    CHIEF COMPLAINT:  Tosin presents today for groin rash.    SKIN CONCERNS:  She presents with a persistent, recurrent groin rash along the panty line characterized by itching and dryness. The rash becomes irritated in areas of skin folds and skin-to-skin contact. Scratching worsens the condition, though itching persists throughout the day. The rash causes discomfort and difficulty wearing pants. Previous treatments including burning flour, cornstarch, diaper cream, and Fish's buttock paste provided minimal relief. Prior prescribed topical and oral medications offered some improvement but did not fully resolve the condition.    MIGRAINE HISTORY:  She has a history of migraines since childhood, initially diagnosed by pediatrician. Current episodes present with sharp pain behind right eye near temple area, described as a pinched sensation, accompanied by eye drooping. She manages symptoms with OTC medications only.      ROS:  ROS as indicated in HPI.         Physical Exam  Constitutional:       Appearance: Normal appearance.   HENT:      Head: Normocephalic and atraumatic.      Nose: Nose normal.   Eyes:      Extraocular Movements: Extraocular movements intact.      Pupils: Pupils are equal, round, and reactive to light.   Pulmonary:      Effort: Pulmonary effort is normal. No respiratory distress.   Musculoskeletal:         General: Normal range of motion.   Skin:     General: Skin is warm and dry.      Findings: Erythema and rash present.      Comments: Sandpaper fine erythematous rash under pannus.  Not as red as it has been in the past, but difficult to resolve   Neurological:      General: No focal deficit present.      Mental Status: She is alert and  oriented to person, place, and time.   Psychiatric:         Mood and Affect: Mood normal.         Behavior: Behavior normal.         Thought Content: Thought content normal.          Assessment & Plan    B37.2 Candidiasis of skin and nail  G43.809 Other migraine, not intractable, without status migrainosus    ## CANDIDIASIS OF SKIN (YEAST INFECTION):  - Evaluated patient's persistent groin rash, likely yeast infection, unresponsive to previous treatments.  - Examination revealed flaking and itchiness along the panty line, exacerbated by scratching and irritated where skin surfaces touch each other.  - Took photographs of affected area for documentation and e-consult.  - Treatment plan includes ketoconazole with hydrocortisone cream to be applied twice daily to affected groin area and recommended OTC antifungal powder.  - Referred to dermatology via e-consult for evaluation of persistent yeast-related rash and possible skin scraping.  - Tosin instructed to contact office via portal message if rash is not improving or recurs after resolution.    ## MIGRAINE:  - Evaluated chronic headaches, possibly migraines, with associated right eye drooping.  - Headaches are located behind the right eye, next to the temple, lasting 3 days, with a pinched and sharp feeling.  - Symptoms have been present for approximately 5 years, with eye drooping during headache episodes.  - Explained that asymmetry in eyelids can be a normal variation and not necessarily indicative of a medical issue.  - Prescribed Topamax 25 mg at night for migraine prevention, with instructions to increase to 50 mg once tolerated.  - Referred to neurology for evaluation of headaches.  - Advised patient to call multiple providers to secure an earlier neurology appointment and to contact the office to report effectiveness of Topamax.    ## FOLLOW-UP:  - Scheduled follow up in 3 months to ensure improvement, confirm neurology consultation, verify patient has  "been seen by specialists, and assess treatment progress.         Review of patient's allergies indicates:  No Known Allergies   Vitals:    05/21/25 1413   BP: 122/74   BP Location: Right arm   Patient Position: Sitting   Pulse: 73   Resp: 14   SpO2: 99%   Weight: 71.9 kg (158 lb 9.6 oz)   Height: 5' 2" (1.575 m)           Results for orders placed or performed in visit on 04/29/25   Vitamin D    Collection Time: 04/29/25 12:04 PM   Result Value Ref Range    Vitamin D 24 (L) 30 - 96 ng/mL   T4, Free    Collection Time: 04/29/25 12:04 PM   Result Value Ref Range    Free T4 1.14 0.71 - 1.51 ng/dL   TSH    Collection Time: 04/29/25 12:04 PM   Result Value Ref Range    TSH 2.402 0.400 - 4.000 uIU/mL      Plan:          Chronic left-sided headaches  -     Ambulatory referral/consult to Neurology; Future; Expected date: 05/28/2025  -     topiramate (TOPAMAX) 25 MG tablet; Take 1 tablet (25 mg total) by mouth 2 (two) times daily.  Dispense: 60 tablet; Refill: 3    Tinea corporis  -     fluconazole (DIFLUCAN) 150 MG Tab; Take 1 tablet (150 mg total) by mouth every 72 hours.  Dispense: 7 tablet; Refill: 1  -     E-Consult to Dermatology  -     ketoconazole-hydrocortisone 2-2.5 % Crea; Apply 1 application  topically 2 (two) times a day.  Dispense: 30 g; Refill: 2        Follow up in about 6 months (around 11/21/2025), or if symptoms worsen or fail to improve.    This note was generated with the assistance of ambient listening technology. Verbal consent was obtained by the patient and accompanying visitor(s) for the recording of patient appointment to facilitate this note. I attest to having reviewed and edited the generated note for accuracy, though some syntax or spelling errors may persist. Please contact the author of this note for any clarification.    "

## 2025-05-30 ENCOUNTER — PATIENT MESSAGE (OUTPATIENT)
Dept: FAMILY MEDICINE | Facility: CLINIC | Age: 30
End: 2025-05-30
Payer: COMMERCIAL

## 2025-05-30 ENCOUNTER — E-CONSULT (OUTPATIENT)
Dept: DERMATOLOGY | Facility: CLINIC | Age: 30
End: 2025-05-30
Payer: COMMERCIAL

## 2025-05-30 ENCOUNTER — TELEPHONE (OUTPATIENT)
Dept: FAMILY MEDICINE | Facility: CLINIC | Age: 30
End: 2025-05-30
Payer: COMMERCIAL

## 2025-05-30 DIAGNOSIS — L30.9 DERMATITIS: Primary | ICD-10-CM

## 2025-05-30 NOTE — CONSULTS
[facility] - Dermatology  Response for E-Consult     Patient Name: Tosin Mckay  MRN: [id number]  Primary Care Provider: Lizzette Germain MD   Requesting Provider: Lizzette Germain MD  Consults    Recommendation: recommend in office visit for biopsy    Additional future steps to consider: given lack of improvement on antifungals, will likely need a biopsy for further workup.     Total time of Consultation: 5 minute    I did not speak to the requesting provider verbally about this.     *This eConsult is based on the clinical data available to me and is furnished without benefit of a physical examination. The eConsult will need to be interpreted in light of any clinical issues or changes in patient status not available to me at the time of filing this eConsults. Significant changes in patient condition or level of acuity should result in immediate formal consultation and reevaluation. Please alert me if you have further questions.    Thank you for this eConsult referral.     Say Mcneill MD  [facility] - Dermatology

## 2025-06-10 ENCOUNTER — PATIENT MESSAGE (OUTPATIENT)
Dept: RADIOLOGY | Facility: HOSPITAL | Age: 30
End: 2025-06-10
Payer: MEDICAID

## 2025-06-10 ENCOUNTER — HOSPITAL ENCOUNTER (OUTPATIENT)
Dept: RADIOLOGY | Facility: HOSPITAL | Age: 30
Discharge: HOME | End: 2025-06-10
Attending: NURSE PRACTITIONER
Payer: MEDICAID

## 2025-06-10 DIAGNOSIS — G45.9 TIA (TRANSIENT ISCHEMIC ATTACK): ICD-10-CM

## 2025-06-10 PROCEDURE — 70544 MR ANGIOGRAPHY HEAD W/O DYE: CPT | Mod: TC

## 2025-06-10 PROCEDURE — 70551 MRI BRAIN STEM W/O DYE: CPT | Mod: TC

## 2025-06-19 ENCOUNTER — HOSPITAL ENCOUNTER (OUTPATIENT)
Dept: RADIOLOGY | Facility: HOSPITAL | Age: 30
Discharge: HOME | End: 2025-06-19
Attending: NURSE PRACTITIONER
Payer: MEDICAID

## 2025-06-19 DIAGNOSIS — G45.9 TIA (TRANSIENT ISCHEMIC ATTACK): ICD-10-CM

## 2025-06-19 PROCEDURE — 93880 EXTRACRANIAL BILAT STUDY: CPT | Mod: 26,,, | Performed by: RADIOLOGY

## 2025-06-19 PROCEDURE — 93880 EXTRACRANIAL BILAT STUDY: CPT | Mod: TC

## (undated) DEVICE — SOL 9P NACL IRR PIC IL

## (undated) DEVICE — WRAP SELF ADH. COBAN 4X5YD

## (undated) DEVICE — GLOVE SENSICARE PI GRN 7

## (undated) DEVICE — COVER SURG LIGHT HANDLE

## (undated) DEVICE — SPONGE GAUZE 16PLY 4X4

## (undated) DEVICE — DRESSING TELFA PAD N ADH 2X3

## (undated) DEVICE — CATH 16FR URETHRL RED RUB

## (undated) DEVICE — BANDAGE MATRIX HK LOOP 2IN 5YD

## (undated) DEVICE — BANDAGE ESMARK 6X12

## (undated) DEVICE — UNDERGLOVE BIOGEL PI SZ 6.5 LF

## (undated) DEVICE — GLOVE SURG ULTRA TOUCH 8.5

## (undated) DEVICE — DRAPE THREE-QUARTER 53X77IN

## (undated) DEVICE — GLOVE SENSICARE PI SURG 6.5

## (undated) DEVICE — GLOVE SURG ULTRA TOUCH 9

## (undated) DEVICE — SYR BULB IRRIG ST 60 LF

## (undated) DEVICE — SUT 4-0 ETHILON 18 PS-2

## (undated) DEVICE — SET DISPOSABLE COLLECTION

## (undated) DEVICE — PAD SUREFIT GRND ELECTRD 10FT

## (undated) DEVICE — NDL ELECTRODE E-Z CLEAN 2.75IN

## (undated) DEVICE — SPONGE/BRUSH SCRUB SURG PCMX

## (undated) DEVICE — GOWN POLY REINF X-LONG 2XL

## (undated) DEVICE — TRAP TISSUE COLLECTION BERKLE

## (undated) DEVICE — GLOVE SENSICARE NEOPRENE 8

## (undated) DEVICE — DRAPE HAND STERILE

## (undated) DEVICE — Device

## (undated) DEVICE — SCRUB HIBICLENS 4% CHG 4OZ

## (undated) DEVICE — TOURNIQUET 1 PORT RED 18X4IN

## (undated) DEVICE — PAD CAST SPECIALIST STRL 4

## (undated) DEVICE — DRAPE STERI U-SHAPED 47X51IN

## (undated) DEVICE — SPONGE LAP 18X18 PREWASHED

## (undated) DEVICE — PACK DRAPE PERI/GYN TIBURON

## (undated) DEVICE — BANDAGE ESMARK ELASTIC ST 4X9

## (undated) DEVICE — CANISTER SUCTION RIGID 3000CC

## (undated) DEVICE — GAUZE SPONGE 4X4 12PLY

## (undated) DEVICE — GLOVE SENSICARE PI SURG 7

## (undated) DEVICE — TOWEL OR DISP STRL BLUE 4/PK

## (undated) DEVICE — TRAY DRY SKIN SCRUB PREP

## (undated) DEVICE — GAUZE SPONGE XRAY 4X4

## (undated) DEVICE — BANDAGE MATRIX HK LOOP 3IN 5YD

## (undated) DEVICE — PAD SANITARY OB STERILE

## (undated) DEVICE — PADDING CAST 4IN SPECIALIST

## (undated) DEVICE — DRESSING N ADH OIL EMUL 3X3

## (undated) DEVICE — SPLINT PLASTER F.S 4INX15IN

## (undated) DEVICE — DRAPE STERI INSTRUMENT 1018

## (undated) DEVICE — SLING ARM COMFT NAVY BLU MED

## (undated) DEVICE — BRIEF MESH LARGE

## (undated) DEVICE — DRAPE C ARM 42 X 120 10/BX

## (undated) DEVICE — BOTTLE COLLECT 2ND SEAL CAP

## (undated) DEVICE — GLOVE SURGEONS ULTRA TOUCH 6.5